# Patient Record
Sex: MALE | Race: BLACK OR AFRICAN AMERICAN | NOT HISPANIC OR LATINO | Employment: FULL TIME | URBAN - METROPOLITAN AREA
[De-identification: names, ages, dates, MRNs, and addresses within clinical notes are randomized per-mention and may not be internally consistent; named-entity substitution may affect disease eponyms.]

---

## 2018-03-07 ENCOUNTER — TELEPHONE (OUTPATIENT)
Dept: INTERNAL MEDICINE | Facility: CLINIC | Age: 63
End: 2018-03-07

## 2018-03-07 NOTE — TELEPHONE ENCOUNTER
----- Message from Cathie Huerta sent at 3/7/2018 11:10 AM CST -----  Contact: self  Pt is calling in regards to getting an earlier appt time on 03/09. Pt would like a call back in regards to this  appt change.    Pt would like a call back at [phone].    Thank you

## 2018-03-09 ENCOUNTER — OFFICE VISIT (OUTPATIENT)
Dept: INTERNAL MEDICINE | Facility: CLINIC | Age: 63
End: 2018-03-09
Attending: INTERNAL MEDICINE
Payer: COMMERCIAL

## 2018-03-09 VITALS
DIASTOLIC BLOOD PRESSURE: 98 MMHG | BODY MASS INDEX: 24.18 KG/M2 | HEART RATE: 68 BPM | HEIGHT: 70 IN | WEIGHT: 168.88 LBS | OXYGEN SATURATION: 97 % | SYSTOLIC BLOOD PRESSURE: 148 MMHG

## 2018-03-09 DIAGNOSIS — R35.0 BENIGN PROSTATIC HYPERPLASIA WITH URINARY FREQUENCY: ICD-10-CM

## 2018-03-09 DIAGNOSIS — I10 HYPERTENSION, BENIGN: ICD-10-CM

## 2018-03-09 DIAGNOSIS — E78.5 HYPERLIPIDEMIA, UNSPECIFIED HYPERLIPIDEMIA TYPE: ICD-10-CM

## 2018-03-09 DIAGNOSIS — Z00.00 ANNUAL PHYSICAL EXAM: Primary | ICD-10-CM

## 2018-03-09 DIAGNOSIS — K21.9 GASTROESOPHAGEAL REFLUX DISEASE WITHOUT ESOPHAGITIS: ICD-10-CM

## 2018-03-09 DIAGNOSIS — F41.8 SITUATIONAL ANXIETY: ICD-10-CM

## 2018-03-09 DIAGNOSIS — R42 VERTIGO: ICD-10-CM

## 2018-03-09 DIAGNOSIS — N40.1 BENIGN PROSTATIC HYPERPLASIA WITH URINARY FREQUENCY: ICD-10-CM

## 2018-03-09 PROCEDURE — 99999 PR PBB SHADOW E&M-EST. PATIENT-LVL III: CPT | Mod: PBBFAC,,, | Performed by: INTERNAL MEDICINE

## 2018-03-09 PROCEDURE — 99386 PREV VISIT NEW AGE 40-64: CPT | Mod: S$GLB,,, | Performed by: INTERNAL MEDICINE

## 2018-03-09 RX ORDER — LOSARTAN POTASSIUM 100 MG/1
100 TABLET ORAL DAILY
Qty: 90 TABLET | Refills: 0 | Status: SHIPPED | OUTPATIENT
Start: 2018-03-09 | End: 2018-07-03 | Stop reason: SDUPTHER

## 2018-03-09 RX ORDER — ATORVASTATIN CALCIUM 20 MG/1
20 TABLET, FILM COATED ORAL DAILY
COMMUNITY
End: 2018-03-09 | Stop reason: SDUPTHER

## 2018-03-09 RX ORDER — LOSARTAN POTASSIUM 100 MG/1
100 TABLET ORAL DAILY
COMMUNITY
End: 2018-03-09 | Stop reason: SDUPTHER

## 2018-03-09 RX ORDER — ATORVASTATIN CALCIUM 20 MG/1
20 TABLET, FILM COATED ORAL DAILY
Qty: 90 TABLET | Refills: 0 | Status: SHIPPED | OUTPATIENT
Start: 2018-03-09 | End: 2018-06-14 | Stop reason: SDUPTHER

## 2018-03-09 NOTE — PROGRESS NOTES
"Subjective:   Patient ID: Cuate Fair is a 62 y.o. male  Chief complaint:   Chief Complaint   Patient presents with    [facility]       HPI    Pt here to [facility] and for annual exam     Hx of HTN - was on losartan 100mg but out for 2 months   bp elevated today - no ha, cp, or sob or vision changes. Not checking bp at home    HLD - out of atorvastatin 20mg for 2 months - mika well in past  Needs refills     BPH - urinates 1-2 times per night - this is stable. No family hx of prostate cancer    3/7/2018: had chol checked at job fair   Total chol: 229  HDL 49        cscope through [facility]    Hx of GERD in past - currently no acid reflux. Not on meds for this currently  Had EGD many years ago - seen by another MD in [place] in 1980s - no ulcer at hat time.   No dry mouth. Only occ acid reflux sx  Thinks was screened for hep c in past  Prior pcp: Dr. Arya Guzman now with [facility]    Review of Systems    Objective:  Vitals:    03/09/18 1452   BP: (!) 148/98   Pulse: 68   SpO2: 97%   Weight: 76.6 kg (168 lb 14 oz)   Height: 5' 10" (1.778 m)     Body mass index is 24.23 kg/m².    Physical Exam   Constitutional: He is oriented to person, place, and time. He appears well-developed and well-nourished.   HENT:   Head: Normocephalic and atraumatic.   Right Ear: External ear normal.   Left Ear: External ear normal.   Nose: Nose normal.   Mouth/Throat: Oropharynx is clear and moist.   No carotid bruits   Eyes: Conjunctivae and EOM are normal.   Neck: Neck supple. No thyromegaly present.   Cardiovascular: Normal rate, regular rhythm, normal heart sounds and intact distal pulses.    Pulmonary/Chest: Effort normal and breath sounds normal.   Abdominal: Soft. Bowel sounds are normal.   Genitourinary:   Genitourinary Comments: Declines prostate exam   Musculoskeletal: He exhibits no edema or tenderness.   Lymphadenopathy:     He has no cervical adenopathy.   Neurological: He is alert and oriented to " person, place, and time.   Skin: Skin is warm and dry.   Psychiatric: His behavior is normal. Thought content normal.   Vitals reviewed.      Assessment:  1. Annual physical exam    2. Benign prostatic hyperplasia with urinary frequency    3. Gastroesophageal reflux disease without esophagitis    4. Vertigo    5. Situational anxiety    6. Hypertension, benign    7. Hyperlipidemia, unspecified hyperlipidemia type        Plan:  Cuate was seen today for establish care.    Diagnoses and all orders for this visit:    Annual physical exam  -     CBC auto differential; Future  -     Comprehensive metabolic panel; Future  -     Lipid panel; Future  -     PSA, Screening; Future  -     TSH; Future  Recommend daily sunscreen, cardiovascular exercise min 30 min 5 days per week. Seatbelts routinely.    Benign prostatic hyperplasia with urinary frequency  -     PSA, Screening; Future  Sx stable over past few years. Was on flomax in past - he does not think needs this and has been off of med for many months    Gastroesophageal reflux disease without esophagitis  Stable, cont diet modification    Vertigo  Hx of this - no sx currently    Situational anxiety  Stable    Other orders  -     atorvastatin (LIPITOR) 20 MG tablet; Take 1 tablet (20 mg total) by mouth once daily.  -     losartan (COZAAR) 100 MG tablet; Take 1 tablet (100 mg total) by mouth once daily.    HTN: resume prior med, uncontrolled today off of med - rtc in 2 weeks for bp check     HLD:  Elevated on recent FLP at work - resume statin, check flp in 3 months    Health Maintenance   Topic Date Due    Hepatitis C Screening  1955    TETANUS VACCINE  05/14/1975    Zoster Vaccine  05/14/2017    Colonoscopy  07/26/2020    Lipid Panel  03/07/2023    Influenza Vaccine  Addressed

## 2018-05-25 ENCOUNTER — LAB VISIT (OUTPATIENT)
Dept: LAB | Facility: OTHER | Age: 63
End: 2018-05-25
Payer: COMMERCIAL

## 2018-05-25 DIAGNOSIS — N40.1 BENIGN PROSTATIC HYPERPLASIA WITH URINARY FREQUENCY: ICD-10-CM

## 2018-05-25 DIAGNOSIS — Z00.00 ANNUAL PHYSICAL EXAM: ICD-10-CM

## 2018-05-25 DIAGNOSIS — R35.0 BENIGN PROSTATIC HYPERPLASIA WITH URINARY FREQUENCY: ICD-10-CM

## 2018-05-25 LAB
ALBUMIN SERPL BCP-MCNC: 4.2 G/DL
ALP SERPL-CCNC: 63 U/L
ALT SERPL W/O P-5'-P-CCNC: 27 U/L
ANION GAP SERPL CALC-SCNC: 11 MMOL/L
AST SERPL-CCNC: 27 U/L
BASOPHILS # BLD AUTO: 0.02 K/UL
BASOPHILS NFR BLD: 0.6 %
BILIRUB SERPL-MCNC: 0.9 MG/DL
BUN SERPL-MCNC: 15 MG/DL
CALCIUM SERPL-MCNC: 9.4 MG/DL
CHLORIDE SERPL-SCNC: 106 MMOL/L
CO2 SERPL-SCNC: 24 MMOL/L
COMPLEXED PSA SERPL-MCNC: 1.1 NG/ML
CREAT SERPL-MCNC: 1.3 MG/DL
DIFFERENTIAL METHOD: ABNORMAL
EOSINOPHIL # BLD AUTO: 0.1 K/UL
EOSINOPHIL NFR BLD: 3.8 %
ERYTHROCYTE [DISTWIDTH] IN BLOOD BY AUTOMATED COUNT: 13.1 %
EST. GFR  (AFRICAN AMERICAN): >60 ML/MIN/1.73 M^2
EST. GFR  (NON AFRICAN AMERICAN): 59 ML/MIN/1.73 M^2
GLUCOSE SERPL-MCNC: 100 MG/DL
HCT VFR BLD AUTO: 48.5 %
HGB BLD-MCNC: 16.4 G/DL
LYMPHOCYTES # BLD AUTO: 1.5 K/UL
LYMPHOCYTES NFR BLD: 43.6 %
MCH RBC QN AUTO: 30.1 PG
MCHC RBC AUTO-ENTMCNC: 33.8 G/DL
MCV RBC AUTO: 89 FL
MONOCYTES # BLD AUTO: 0.4 K/UL
MONOCYTES NFR BLD: 10.2 %
NEUTROPHILS # BLD AUTO: 1.4 K/UL
NEUTROPHILS NFR BLD: 41.8 %
PLATELET # BLD AUTO: 135 K/UL
PMV BLD AUTO: 11.7 FL
POTASSIUM SERPL-SCNC: 3.8 MMOL/L
PROT SERPL-MCNC: 7.2 G/DL
RBC # BLD AUTO: 5.44 M/UL
SODIUM SERPL-SCNC: 141 MMOL/L
TSH SERPL DL<=0.005 MIU/L-ACNC: 0.74 UIU/ML
WBC # BLD AUTO: 3.44 K/UL

## 2018-05-25 PROCEDURE — 85025 COMPLETE CBC W/AUTO DIFF WBC: CPT

## 2018-05-25 PROCEDURE — 36415 COLL VENOUS BLD VENIPUNCTURE: CPT

## 2018-05-25 PROCEDURE — 84443 ASSAY THYROID STIM HORMONE: CPT

## 2018-05-25 PROCEDURE — 84153 ASSAY OF PSA TOTAL: CPT

## 2018-05-25 PROCEDURE — 80053 COMPREHEN METABOLIC PANEL: CPT

## 2018-05-28 ENCOUNTER — TELEPHONE (OUTPATIENT)
Dept: INTERNAL MEDICINE | Facility: CLINIC | Age: 63
End: 2018-05-28

## 2018-05-28 DIAGNOSIS — D69.6 THROMBOCYTOPENIA: ICD-10-CM

## 2018-05-28 DIAGNOSIS — D72.819 LEUKOPENIA, UNSPECIFIED TYPE: Primary | ICD-10-CM

## 2018-05-28 NOTE — TELEPHONE ENCOUNTER
Please notify pt that his labs are stable except that platelets are mildly low - rec repeat this to see if it is persistent     Please schedule repeat cbc, folate, b12, path int diff in 1-2 weeks

## 2018-05-31 ENCOUNTER — PATIENT OUTREACH (OUTPATIENT)
Dept: ADMINISTRATIVE | Facility: HOSPITAL | Age: 63
End: 2018-05-31

## 2018-05-31 NOTE — PROGRESS NOTES
[facility] is committed to your overall health.  To help you get the most out of each of your visits, we will review your information to make sure you are up to date on all of your recommended tests and/or procedures.       Your PCP  Carolyne Beth MD   found that you may be due for:       Health Maintenance Due   Topic Date Due    Hepatitis C Screening  1955    TETANUS VACCINE  05/14/1975    Zoster Vaccine  05/14/2017             If you have had any of the above done at another facility, please bring the records or information with you so that your record at [facility] will be complete.  If you would like to schedule any of these, please contact me.     If you are currently taking medication, please bring it with you to your appointment for review.     Also, if you have any type of Advanced Directives, please bring them with you to your office visit so we may scan them into your chart.       Thank you for Choosing [facility] for your healthcare needs.        Additional Information  If you have questions, you can email [email or web address] or call [phone]  to talk to our [facility] staff. Remember, [employer] is NOT to be used for urgent needs. For medical emergencies, dial 911.

## 2018-06-11 DIAGNOSIS — Z11.59 NEED FOR HEPATITIS C SCREENING TEST: Primary | ICD-10-CM

## 2018-06-12 ENCOUNTER — LAB VISIT (OUTPATIENT)
Dept: LAB | Facility: OTHER | Age: 63
End: 2018-06-12
Attending: INTERNAL MEDICINE
Payer: COMMERCIAL

## 2018-06-12 ENCOUNTER — OFFICE VISIT (OUTPATIENT)
Dept: INTERNAL MEDICINE | Facility: CLINIC | Age: 63
End: 2018-06-12
Attending: INTERNAL MEDICINE
Payer: COMMERCIAL

## 2018-06-12 VITALS
OXYGEN SATURATION: 97 % | HEIGHT: 68 IN | SYSTOLIC BLOOD PRESSURE: 130 MMHG | DIASTOLIC BLOOD PRESSURE: 78 MMHG | WEIGHT: 167.31 LBS | BODY MASS INDEX: 25.36 KG/M2 | HEART RATE: 78 BPM

## 2018-06-12 DIAGNOSIS — D69.6 THROMBOCYTOPENIA: ICD-10-CM

## 2018-06-12 DIAGNOSIS — E78.5 HYPERLIPIDEMIA, UNSPECIFIED HYPERLIPIDEMIA TYPE: Primary | ICD-10-CM

## 2018-06-12 DIAGNOSIS — I10 ESSENTIAL HYPERTENSION: ICD-10-CM

## 2018-06-12 DIAGNOSIS — Z00.00 ANNUAL PHYSICAL EXAM: ICD-10-CM

## 2018-06-12 DIAGNOSIS — D70.9 NEUTROPENIA, UNSPECIFIED TYPE: ICD-10-CM

## 2018-06-12 DIAGNOSIS — Z11.59 NEED FOR HEPATITIS C SCREENING TEST: ICD-10-CM

## 2018-06-12 DIAGNOSIS — D72.819 LEUKOPENIA, UNSPECIFIED TYPE: ICD-10-CM

## 2018-06-12 LAB
BASOPHILS # BLD AUTO: 0.02 K/UL
BASOPHILS NFR BLD: 0.6 %
CHOLEST SERPL-MCNC: 206 MG/DL
CHOLEST/HDLC SERPL: 4 {RATIO}
DIFFERENTIAL METHOD: ABNORMAL
EOSINOPHIL # BLD AUTO: 0.1 K/UL
EOSINOPHIL NFR BLD: 2.8 %
ERYTHROCYTE [DISTWIDTH] IN BLOOD BY AUTOMATED COUNT: 12.9 %
FOLATE SERPL-MCNC: 12.1 NG/ML
HCT VFR BLD AUTO: 44.8 %
HDLC SERPL-MCNC: 51 MG/DL
HDLC SERPL: 24.8 %
HGB BLD-MCNC: 15.6 G/DL
LDLC SERPL CALC-MCNC: 140.2 MG/DL
LYMPHOCYTES # BLD AUTO: 1.5 K/UL
LYMPHOCYTES NFR BLD: 48.1 %
MCH RBC QN AUTO: 30.4 PG
MCHC RBC AUTO-ENTMCNC: 34.8 G/DL
MCV RBC AUTO: 87 FL
MONOCYTES # BLD AUTO: 0.3 K/UL
MONOCYTES NFR BLD: 10.7 %
NEUTROPHILS # BLD AUTO: 1.2 K/UL
NEUTROPHILS NFR BLD: 37.2 %
NONHDLC SERPL-MCNC: 155 MG/DL
PATH REV BLD -IMP: NORMAL
PLATELET # BLD AUTO: 130 K/UL
PMV BLD AUTO: 11.3 FL
RBC # BLD AUTO: 5.13 M/UL
TRIGL SERPL-MCNC: 74 MG/DL
VIT B12 SERPL-MCNC: 256 PG/ML
WBC # BLD AUTO: 3.18 K/UL

## 2018-06-12 PROCEDURE — 80061 LIPID PANEL: CPT

## 2018-06-12 PROCEDURE — 36415 COLL VENOUS BLD VENIPUNCTURE: CPT

## 2018-06-12 PROCEDURE — 3008F BODY MASS INDEX DOCD: CPT | Mod: CPTII,S$GLB,, | Performed by: INTERNAL MEDICINE

## 2018-06-12 PROCEDURE — 86803 HEPATITIS C AB TEST: CPT

## 2018-06-12 PROCEDURE — 85060 BLOOD SMEAR INTERPRETATION: CPT | Mod: ,,, | Performed by: PATHOLOGY

## 2018-06-12 PROCEDURE — 82607 VITAMIN B-12: CPT

## 2018-06-12 PROCEDURE — 3078F DIAST BP <80 MM HG: CPT | Mod: CPTII,S$GLB,, | Performed by: INTERNAL MEDICINE

## 2018-06-12 PROCEDURE — 99214 OFFICE O/P EST MOD 30 MIN: CPT | Mod: S$GLB,,, | Performed by: INTERNAL MEDICINE

## 2018-06-12 PROCEDURE — 85025 COMPLETE CBC W/AUTO DIFF WBC: CPT

## 2018-06-12 PROCEDURE — 3075F SYST BP GE 130 - 139MM HG: CPT | Mod: CPTII,S$GLB,, | Performed by: INTERNAL MEDICINE

## 2018-06-12 PROCEDURE — 99999 PR PBB SHADOW E&M-EST. PATIENT-LVL III: CPT | Mod: PBBFAC,,, | Performed by: INTERNAL MEDICINE

## 2018-06-12 PROCEDURE — 82746 ASSAY OF FOLIC ACID SERUM: CPT

## 2018-06-12 NOTE — PROGRESS NOTES
"Subjective:   Patient ID: Cuate Fair is a 63 y.o. male  Chief complaint:   Chief Complaint   Patient presents with    Follow-up     on medication       HPI    Pt here for HTN and HLD f/u   Reviewed labs from May and labs checked today - those results pending  He reports thinks was dx with hep c many years ago but then saw specialist and 'told not to worry about it'.     HTN: controlled on meds today   HLD: taking med daily - flp pending from today after pt resumed statin   Thrombocyptenia/neutropenia: no gross bleeding - labs from today pending    Review of Systems   Constitutional: Negative for chills and fever.   HENT: Negative for rhinorrhea and sore throat.    Eyes: Negative for pain and visual disturbance.   Respiratory: Negative for cough, shortness of breath and wheezing.    Cardiovascular: Negative for chest pain and palpitations.   Gastrointestinal: Negative for abdominal pain, constipation and diarrhea.   Genitourinary: Negative for dysuria and hematuria.   Musculoskeletal: Negative for back pain and joint swelling.   Skin: Negative for color change and rash.   Neurological: Negative for dizziness and headaches.   Hematological: Negative for adenopathy. Does not bruise/bleed easily.   Psychiatric/Behavioral: Negative for sleep disturbance. The patient is not nervous/anxious.        Objective:  Vitals:    06/12/18 1333   BP: 130/78   BP Location: Left arm   Patient Position: Sitting   BP Method: Medium (Manual)   Pulse: 78   SpO2: 97%   Weight: 75.9 kg (167 lb 5.3 oz)   Height: 5' 8" (1.727 m)     Body mass index is 25.44 kg/m².    Physical Exam   Constitutional: He is oriented to person, place, and time. He appears well-developed and well-nourished.   HENT:   Head: Normocephalic and atraumatic.   Eyes: Conjunctivae and EOM are normal.   Neck: Neck supple.   Cardiovascular: Normal rate, regular rhythm and intact distal pulses.    Pulmonary/Chest: Effort normal and breath sounds normal. "   Abdominal: Soft. Bowel sounds are normal.   Lymphadenopathy:     He has no cervical adenopathy.   Neurological: He is alert and oriented to person, place, and time.   Skin: Skin is warm and dry.   Psychiatric: He has a normal mood and affect. His behavior is normal. Judgment and thought content normal.   Vitals reviewed.      Assessment:  1. Need for Tdap vaccination    2. Hyperlipidemia, unspecified hyperlipidemia type    3. Essential hypertension    4. Thrombocytopenia    5. Neutropenia, unspecified type        Plan:  Cuate was seen today for follow-up.    Diagnoses and all orders for this visit:    Hyperlipidemia, unspecified hyperlipidemia type  Resumed statin, elevated at last check - review labs from today and make adjustments vs cont current dose pending those results    Essential hypertension  Controlled, cont meds    Thrombocytopenia  Labs pending today - if persistent will refer to h/o    Neutropenia, unspecified type  As above       Health Maintenance   Topic Date Due    TETANUS VACCINE  05/14/1975    Zoster Vaccine  05/14/2017    Hepatitis C Screening  06/13/2016 (Originally 1955)    Influenza Vaccine  08/01/2018    Colonoscopy  07/26/2020    Lipid Panel  03/07/2023

## 2018-06-13 ENCOUNTER — TELEPHONE (OUTPATIENT)
Dept: INTERNAL MEDICINE | Facility: CLINIC | Age: 63
End: 2018-06-13

## 2018-06-13 LAB
HCV AB SERPL QL IA: NEGATIVE
PATH REV BLD -IMP: NORMAL

## 2018-06-13 NOTE — TELEPHONE ENCOUNTER
----- Message from Kori Quezada sent at 6/13/2018  9:23 AM CDT -----  Contact: WINIFRED FERNANDEZ [[id number]]            Name of Who is Calling: WINIFRED FERNANDEZ [[id number]]    What is the request in detail: pt would like to discuss test results. Please call    Can the clinic reply by [facility]: no    What Number to Call Back [facility]: [phone]

## 2018-06-14 ENCOUNTER — TELEPHONE (OUTPATIENT)
Dept: INTERNAL MEDICINE | Facility: CLINIC | Age: 63
End: 2018-06-14

## 2018-06-14 DIAGNOSIS — D72.819 LEUKOPENIA, UNSPECIFIED TYPE: ICD-10-CM

## 2018-06-14 DIAGNOSIS — E53.8 B12 DEFICIENCY: ICD-10-CM

## 2018-06-14 DIAGNOSIS — D69.6 THROMBOCYTOPENIA: ICD-10-CM

## 2018-06-14 DIAGNOSIS — E78.5 HYPERLIPIDEMIA, UNSPECIFIED HYPERLIPIDEMIA TYPE: Primary | ICD-10-CM

## 2018-06-14 RX ORDER — LANOLIN ALCOHOL/MO/W.PET/CERES
1000 CREAM (GRAM) TOPICAL DAILY
COMMUNITY
Start: 2018-06-14

## 2018-06-14 RX ORDER — ATORVASTATIN CALCIUM 40 MG/1
40 TABLET, FILM COATED ORAL DAILY
Qty: 90 TABLET | Refills: 1 | Status: SHIPPED | OUTPATIENT
Start: 2018-06-14 | End: 2019-06-13 | Stop reason: SDUPTHER

## 2018-06-14 NOTE — TELEPHONE ENCOUNTER
Please notify pt that labs returned.   Screening for hep c is negative   Folate level wnl.     b12 is low - rec start otc b12 at 1000mcg daily to improve this  Blood counts stable but still low wbc and platelet count  rec repeat cbc in 3 months along with b12 level - if platelets and wbc still low after starting b12 will refer to hematology     Cholesterol still high - rec inc lipitor to 40mg - new rx sent to pharmacy - repeat flp in 3 months    Schedule labs in 3 months - thanks!

## 2018-06-29 ENCOUNTER — TELEPHONE (OUTPATIENT)
Dept: INTERNAL MEDICINE | Facility: CLINIC | Age: 63
End: 2018-06-29

## 2018-06-29 NOTE — TELEPHONE ENCOUNTER
Tried to call pt and inform him of PCP advice but there was no answer.   Will call pt at later time

## 2018-06-29 NOTE — TELEPHONE ENCOUNTER
----- Message from Glen Looney MA sent at 6/29/2018 12:49 PM CDT -----  Contact: Pt  Pt called and would like a call back from the nurse regarding his B/p  med that have him with dizziness.         Pt can be reached at [phone].    Thanks

## 2018-06-29 NOTE — TELEPHONE ENCOUNTER
Spoke w/ pt he called in stating that he notice that after he takes his BP medication he notices that it causes him to be dizzy.   This morning pt stated this was the worst it has been. He took his BP medication this morning and 20 mins later as he was driving he felt his self getting dizzy and pt stated that he blanked out for a quick min.  Pt did not crash or anything , he stated that he just felt his self blank out and open back up .     Pt asked for further rec or advice form PCP   Please authorize and advise

## 2018-06-29 NOTE — TELEPHONE ENCOUNTER
Recommend dec dose of losartan to 50mg and for pt to be evaluated today for symptoms at urgent care or after hours clinic   If he lost consciousness then rec he go to ER now and to not drive   Please schedule f/u appt with me or another provider for bp check and to f/u on his symptoms

## 2018-07-02 ENCOUNTER — TELEPHONE (OUTPATIENT)
Dept: INTERNAL MEDICINE | Facility: CLINIC | Age: 63
End: 2018-07-02

## 2018-07-02 NOTE — TELEPHONE ENCOUNTER
----- Message from Mindy Kern sent at 7/2/2018  9:27 AM CDT -----  Contact: Self            Name of Who is Calling: Self      What is the request in detail: Pt states he is following up regarding the blood pressure medication and what he needs to do. Pt is returning a call.      Can the clinic reply by [facility]: Y      What Number to Call Back if not in [facility]: [phone]

## 2018-07-02 NOTE — TELEPHONE ENCOUNTER
Returned pt call but there is no answer and no vm box set up     Please inform pt of PCP note from 6/29/2018 when pt return call stating:    recommend dec dose of losartan to 50mg and for pt to be evaluated today for symptoms at urgent care or after hours clinic   If he lost consciousness then rec he go to ER now and to not drive   Please schedule f/u appt with me or another provider for bp check and to f/u on his symptoms

## 2018-07-03 ENCOUNTER — OFFICE VISIT (OUTPATIENT)
Dept: INTERNAL MEDICINE | Facility: CLINIC | Age: 63
End: 2018-07-03
Payer: COMMERCIAL

## 2018-07-03 VITALS
HEIGHT: 68 IN | BODY MASS INDEX: 25.13 KG/M2 | HEART RATE: 74 BPM | SYSTOLIC BLOOD PRESSURE: 138 MMHG | OXYGEN SATURATION: 96 % | WEIGHT: 165.81 LBS | DIASTOLIC BLOOD PRESSURE: 102 MMHG

## 2018-07-03 DIAGNOSIS — E78.5 HYPERLIPIDEMIA, UNSPECIFIED HYPERLIPIDEMIA TYPE: ICD-10-CM

## 2018-07-03 DIAGNOSIS — R40.20 LOSS OF CONSCIOUSNESS: ICD-10-CM

## 2018-07-03 DIAGNOSIS — Z56.6 STRESS AT WORK: ICD-10-CM

## 2018-07-03 DIAGNOSIS — I10 ESSENTIAL HYPERTENSION: Primary | ICD-10-CM

## 2018-07-03 DIAGNOSIS — D69.6 THROMBOCYTOPENIA: ICD-10-CM

## 2018-07-03 DIAGNOSIS — F41.8 SITUATIONAL ANXIETY: ICD-10-CM

## 2018-07-03 PROCEDURE — 93010 ELECTROCARDIOGRAM REPORT: CPT | Mod: S$GLB,,, | Performed by: INTERNAL MEDICINE

## 2018-07-03 PROCEDURE — 87086 URINE CULTURE/COLONY COUNT: CPT

## 2018-07-03 PROCEDURE — 81001 URINALYSIS AUTO W/SCOPE: CPT

## 2018-07-03 PROCEDURE — 87186 SC STD MICRODIL/AGAR DIL: CPT

## 2018-07-03 PROCEDURE — 99213 OFFICE O/P EST LOW 20 MIN: CPT | Mod: S$GLB,,, | Performed by: NURSE PRACTITIONER

## 2018-07-03 PROCEDURE — 3080F DIAST BP >= 90 MM HG: CPT | Mod: CPTII,S$GLB,, | Performed by: NURSE PRACTITIONER

## 2018-07-03 PROCEDURE — 3075F SYST BP GE 130 - 139MM HG: CPT | Mod: CPTII,S$GLB,, | Performed by: NURSE PRACTITIONER

## 2018-07-03 PROCEDURE — 99999 PR PBB SHADOW E&M-EST. PATIENT-LVL V: CPT | Mod: PBBFAC,,, | Performed by: NURSE PRACTITIONER

## 2018-07-03 PROCEDURE — 3008F BODY MASS INDEX DOCD: CPT | Mod: CPTII,S$GLB,, | Performed by: NURSE PRACTITIONER

## 2018-07-03 PROCEDURE — 87077 CULTURE AEROBIC IDENTIFY: CPT

## 2018-07-03 PROCEDURE — 93005 ELECTROCARDIOGRAM TRACING: CPT | Mod: S$GLB,,, | Performed by: NURSE PRACTITIONER

## 2018-07-03 PROCEDURE — 87088 URINE BACTERIA CULTURE: CPT

## 2018-07-03 RX ORDER — LOSARTAN POTASSIUM 100 MG/1
50 TABLET ORAL DAILY
Qty: 90 TABLET | Refills: 0 | Status: SHIPPED | OUTPATIENT
Start: 2018-07-03 | End: 2018-12-14

## 2018-07-03 SDOH — SOCIAL DETERMINANTS OF HEALTH (SDOH): OTHER PHYSICAL AND MENTAL STRAIN RELATED TO WORK: Z56.6

## 2018-07-03 NOTE — TELEPHONE ENCOUNTER
Pt came into office for a visit for elevated b/p and he inform me that med for b12 was not call in.I did sppke with rx and they states that med can brought OTC.

## 2018-07-03 NOTE — PATIENT INSTRUCTIONS
Change Cozaar to 50 mg po daily.  Continue Vitamin B12 1000 mcg po daily.   EKG, Echo.  CBC, CMP, TSH, Lipid panel, Hepatic panel.  U/A and Urine Culture.  Appropriate hydration and rest.  Encouraged Healthy lifestyle changes. Encouraged to increase exercise as tolerated (moderate-intensity aerobic activity and muscle-strengthening activities) improve diet to heart healthy, low sodium diet.  Denies bleeding, excessive bruising, or petechiae.  Cautioned on aspirin, ibuprofen, and alcohol use.  Encouraged safety precautions to prevent injury.   Please report to Emergency Department for severe or worsening symptoms.  Referral to Cardiology.  Referral to Neurology.  F/U with PCP in 1 week. Please bring BP readings to appointment.  RTC prn.

## 2018-07-03 NOTE — PROGRESS NOTES
Subjective:       Patient ID: Cuate Fair is a 63 y.o. male.    Chief Complaint: Hypertension    Mr. Cuate Fair is a 64 y/o AAM who presents with a one week h/o elevated blood pressure readings. He is AAOx3, in nad, respirations even/unlabored, denies cp, sob, n/v. He reports that he discontinued use of BP medication because it was making him too dizzy. He endorses an episode LOC x1. He reports that he has been working long hours and is under a lot of stress from job which is strenuous work and requires him to be outdoors in the hot sun. He did not bring list of BP readings to this visit.   Hypertension   This is a new problem. The current episode started in the past 7 days. The problem has been gradually improving since onset. The problem is controlled. Associated symptoms include anxiety. Pertinent negatives include no blurred vision, chest pain, headaches, malaise/fatigue, neck pain, orthopnea, palpitations, peripheral edema, PND, shortness of breath or sweats. (He reports a LOC x1 episode for a few seconds.  Light-headedness  Dizziness  He also reports a lot of stress from his job) There are no associated agents to hypertension. Risk factors for coronary artery disease include stress and dyslipidemia. Past treatments include angiotensin blockers (patient reports that he has not taken BP medication in the past week). The current treatment provides moderate improvement. Compliance problems include medication side effects.  There is no history of angina, kidney disease, CAD/MI, CVA, heart failure, left ventricular hypertrophy, PVD or retinopathy. There is no history of chronic renal disease, coarctation of the aorta, hyperaldosteronism, hypercortisolism, hyperparathyroidism, a hypertension causing med, pheochromocytoma, renovascular disease, sleep apnea or a thyroid problem.        Review of Systems   Constitutional: Negative for activity change, appetite change, chills, diaphoresis, fatigue,  "fever, malaise/fatigue and unexpected weight change.   HENT: Negative for congestion, dental problem, drooling, ear discharge, ear pain, facial swelling, hearing loss, mouth sores, nosebleeds, postnasal drip, rhinorrhea, sinus pain, sinus pressure, sneezing, sore throat, tinnitus, trouble swallowing and voice change.    Eyes: Negative for blurred vision, pain and visual disturbance.   Respiratory: Negative for cough, choking, chest tightness, shortness of breath, wheezing and stridor.    Cardiovascular: Negative for chest pain, palpitations, orthopnea, leg swelling and PND.        Hypertension   Gastrointestinal: Negative for abdominal distention, abdominal pain, constipation, diarrhea, nausea, rectal pain and vomiting.   Musculoskeletal: Negative for gait problem, neck pain and neck stiffness.   Skin: Negative for color change, rash and wound.   Allergic/Immunologic: Negative for environmental allergies.   Neurological: Positive for dizziness and light-headedness. Negative for tremors, seizures, syncope, facial asymmetry, speech difficulty, weakness, numbness and headaches.        Episode of LOC.   Hematological: Negative for adenopathy. Does not bruise/bleed easily.   Psychiatric/Behavioral: Negative for agitation, confusion and decreased concentration. The patient is not nervous/anxious.        Objective:       BP (!) 138/102   Pulse 74   Ht 5' 8" (1.727 m)   Wt 75.2 kg (165 lb 12.6 oz)   SpO2 96%   BMI 25.21 kg/m²     Physical Exam   Constitutional: He is oriented to person, place, and time. He appears well-developed and well-nourished.   HENT:   Head: Normocephalic and atraumatic. Not macrocephalic and not microcephalic. Head is without raccoon's eyes, without Perera's sign, without abrasion, without contusion, without laceration, without right periorbital erythema and without left periorbital erythema. Hair is normal.   Right Ear: No lacerations. No drainage, swelling or tenderness. No foreign bodies. No " mastoid tenderness. Tympanic membrane is not injected, not scarred, not perforated, not erythematous, not retracted and not bulging. Tympanic membrane mobility is normal. No middle ear effusion. No hemotympanum. No decreased hearing is noted.   Left Ear: No lacerations. No drainage, swelling or tenderness. No foreign bodies. No mastoid tenderness. Tympanic membrane is not injected, not scarred, not perforated, not erythematous, not retracted and not bulging. Tympanic membrane mobility is normal.  No middle ear effusion. No hemotympanum. No decreased hearing is noted.   Nose: Nose normal. No mucosal edema, rhinorrhea, nose lacerations, sinus tenderness or nasal deformity.   Mouth/Throat: Uvula is midline.   Eyes: Conjunctivae and lids are normal. No scleral icterus.   Neck: Trachea normal. Neck supple. No spinous process tenderness and no muscular tenderness present. No neck rigidity. No edema, no erythema and normal range of motion present. No thyroid mass and no thyromegaly present.   Cardiovascular: Normal rate, regular rhythm, normal heart sounds and intact distal pulses.  Exam reveals no gallop and no friction rub.    No murmur heard.  Pulmonary/Chest: Effort normal and breath sounds normal. No respiratory distress. He has no wheezes. He has no rales. He exhibits no tenderness.   Abdominal: Soft. Bowel sounds are normal. He exhibits no distension.   Musculoskeletal: Normal range of motion.   Lymphadenopathy:        Head (right side): No submental, no submandibular, no tonsillar, no preauricular and no posterior auricular adenopathy present.        Head (left side): No submental, no submandibular, no tonsillar, no preauricular, no posterior auricular and no occipital adenopathy present.   Neurological: He is alert and oriented to person, place, and time. No cranial nerve deficit.   Skin: Skin is warm and dry.   Psychiatric: His behavior is normal. Judgment and thought content normal. His mood appears anxious.    Vitals reviewed.       Assessment:       1. Essential hypertension    2. Loss of consciousness    3. Hyperlipidemia, unspecified hyperlipidemia type    4. Thrombocytopenia    5. Stress at work    6. Situational anxiety        Plan:       Change Cozaar to 50 mg po daily.  Continue Vitamin B12 1000 mcg po daily.   EKG, Echocardiogram.  CBC, CMP, TSH, Lipid panel, Hepatic panel.  U/A and Urine Culture.  Appropriate hydration and rest.  Encouraged Healthy lifestyle changes. Encouraged to increase exercise as tolerated (moderate-intensity aerobic activity and muscle-strengthening activities) improve diet to heart healthy, low sodium diet.  Please report to Emergency Department for severe or worsening symptoms.  Referral to Cardiology.  Referral to Neurology.  F/U with PCP in 1 week. Please bring BP readings to appointment.  RTC prn.

## 2018-07-03 NOTE — TELEPHONE ENCOUNTER
----- Message from Matt Sheikh sent at 7/3/2018  9:10 AM CDT -----  Contact: Cuate Fair            Name of Who is Calling: Cuate Fair      What is the request in detail: Patient following up on his high blood pressure    Can the clinic reply by [facility]: No      What Number to Call Back if not in [facility]: [phone]

## 2018-07-04 LAB
BILIRUB UR QL STRIP: NEGATIVE
CLARITY UR REFRACT.AUTO: CLEAR
COLOR UR AUTO: YELLOW
GLUCOSE UR QL STRIP: NEGATIVE
HGB UR QL STRIP: NEGATIVE
KETONES UR QL STRIP: NEGATIVE
LEUKOCYTE ESTERASE UR QL STRIP: NEGATIVE
MICROSCOPIC COMMENT: NORMAL
NITRITE UR QL STRIP: NEGATIVE
PH UR STRIP: 6 [PH] (ref 5–8)
PROT UR QL STRIP: NEGATIVE
RBC #/AREA URNS AUTO: 2 /HPF (ref 0–4)
SP GR UR STRIP: 1.02 (ref 1–1.03)
URN SPEC COLLECT METH UR: NORMAL
UROBILINOGEN UR STRIP-ACNC: NEGATIVE EU/DL
WBC #/AREA URNS AUTO: 0 /HPF (ref 0–5)

## 2018-07-05 ENCOUNTER — LAB VISIT (OUTPATIENT)
Dept: LAB | Facility: OTHER | Age: 63
End: 2018-07-05
Attending: INTERNAL MEDICINE
Payer: COMMERCIAL

## 2018-07-05 DIAGNOSIS — E78.5 HYPERLIPIDEMIA, UNSPECIFIED HYPERLIPIDEMIA TYPE: ICD-10-CM

## 2018-07-05 DIAGNOSIS — I10 ESSENTIAL HYPERTENSION: ICD-10-CM

## 2018-07-05 LAB
ALBUMIN SERPL BCP-MCNC: 4.4 G/DL
ALP SERPL-CCNC: 60 U/L
ALT SERPL W/O P-5'-P-CCNC: 16 U/L
ANION GAP SERPL CALC-SCNC: 9 MMOL/L
AST SERPL-CCNC: 20 U/L
BILIRUB SERPL-MCNC: 1 MG/DL
BUN SERPL-MCNC: 16 MG/DL
CALCIUM SERPL-MCNC: 9.6 MG/DL
CHLORIDE SERPL-SCNC: 105 MMOL/L
CO2 SERPL-SCNC: 27 MMOL/L
CREAT SERPL-MCNC: 1.3 MG/DL
EST. GFR  (AFRICAN AMERICAN): >60 ML/MIN/1.73 M^2
EST. GFR  (NON AFRICAN AMERICAN): 59 ML/MIN/1.73 M^2
GLUCOSE SERPL-MCNC: 83 MG/DL
POTASSIUM SERPL-SCNC: 4.1 MMOL/L
PROT SERPL-MCNC: 7.5 G/DL
SODIUM SERPL-SCNC: 141 MMOL/L
TSH SERPL DL<=0.005 MIU/L-ACNC: 0.82 UIU/ML

## 2018-07-05 PROCEDURE — 36415 COLL VENOUS BLD VENIPUNCTURE: CPT

## 2018-07-05 PROCEDURE — 84443 ASSAY THYROID STIM HORMONE: CPT

## 2018-07-05 PROCEDURE — 80053 COMPREHEN METABOLIC PANEL: CPT

## 2018-07-06 LAB — BACTERIA UR CULT: NORMAL

## 2018-07-07 PROBLEM — I10 HTN (HYPERTENSION), BENIGN: Status: ACTIVE | Noted: 2018-07-07

## 2018-07-07 PROBLEM — E78.00 HYPERCHOLESTEROLEMIA: Status: ACTIVE | Noted: 2018-07-07

## 2018-07-07 PROBLEM — E66.3 OVERWEIGHT (BMI 25.0-29.9): Status: ACTIVE | Noted: 2018-07-07

## 2018-07-08 NOTE — PROGRESS NOTES
Subjective:   Patient ID:  Cuate Fair is a 63 y.o. male who presents for evaluation of HTN    HPI: Patient is here for evaluation and treatment of hypertension.The patient is here for CAD risk factors.     The patient has no chest pain, SOB, TIA, palpitations, syncope .Patient does exercise alot.He was not always compliant with lipid med.He had 1-2 seconds of presyncope while driving and has dizziness or head fullness in am.No alcohol or smoking.Trying to keep salt low.        Review of Systems   Constitution: Negative for chills, decreased appetite, diaphoresis, fever, weakness, malaise/fatigue, night sweats, weight gain and weight loss.   HENT: Negative for congestion, hoarse voice, nosebleeds, sore throat and tinnitus.    Eyes: Negative for blurred vision, double vision, vision loss in left eye, vision loss in right eye, visual disturbance and visual halos.   Cardiovascular: Positive for near-syncope. Negative for chest pain, claudication, cyanosis, dyspnea on exertion, irregular heartbeat, leg swelling, orthopnea, palpitations, paroxysmal nocturnal dyspnea and syncope.   Respiratory: Negative for cough, hemoptysis, shortness of breath, sleep disturbances due to breathing, snoring, sputum production and wheezing.    Endocrine: Negative for cold intolerance, heat intolerance, polydipsia, polyphagia and polyuria.   Hematologic/Lymphatic: Negative for adenopathy and bleeding problem. Does not bruise/bleed easily.   Skin: Negative for color change, dry skin, flushing, itching, nail changes, poor wound healing, rash, skin cancer, suspicious lesions and unusual hair distribution.   Musculoskeletal: Negative for arthritis, back pain, falls, gout, joint pain, joint swelling, muscle cramps, muscle weakness, myalgias and stiffness.   Gastrointestinal: Negative for abdominal pain, anorexia, change in bowel habit, constipation, diarrhea, dysphagia, heartburn, hematemesis, hematochezia, melena and vomiting.  "  Genitourinary: Negative for decreased libido, dysuria, hematuria, hesitancy and urgency.   Neurological: Positive for dizziness. Negative for excessive daytime sleepiness, focal weakness, headaches, light-headedness, loss of balance, numbness, paresthesias, seizures, sensory change, tremors and vertigo.   Psychiatric/Behavioral: Negative for altered mental status, depression, hallucinations, memory loss, substance abuse and suicidal ideas. The patient does not have insomnia and is not nervous/anxious.    Allergic/Immunologic: Negative for environmental allergies and hives.       Objective: BP (!) 132/100   Pulse 64   Ht 5' 8" (1.727 m)   Wt 74.5 kg (164 lb 3.9 oz)   BMI 24.97 kg/m²      Physical Exam   Constitutional: He is oriented to person, place, and time. He appears well-developed and well-nourished. No distress.   HENT:   Head: Normocephalic.   Eyes: EOM are normal. Pupils are equal, round, and reactive to light.   Neck: Normal range of motion. No thyromegaly present.   Cardiovascular: Normal rate, regular rhythm, normal heart sounds and intact distal pulses.  Exam reveals no gallop and no friction rub.    No murmur heard.  Pulses:       Carotid pulses are 3+ on the right side, and 3+ on the left side.       Radial pulses are 3+ on the right side, and 3+ on the left side.        Femoral pulses are 3+ on the right side, and 3+ on the left side.       Popliteal pulses are 3+ on the right side, and 3+ on the left side.        Dorsalis pedis pulses are 3+ on the right side, and 3+ on the left side.        Posterior tibial pulses are 3+ on the right side, and 3+ on the left side.   Pulmonary/Chest: Effort normal and breath sounds normal. No respiratory distress. He has no wheezes. He has no rales. He exhibits no tenderness.   Abdominal: Soft. He exhibits no distension and no mass. There is no tenderness.   Musculoskeletal: Normal range of motion.   Lymphadenopathy:     He has no cervical adenopathy. "   Neurological: He is alert and oriented to person, place, and time.   Skin: Skin is warm. He is not diaphoretic. No cyanosis. Nails show no clubbing.   Psychiatric: He has a normal mood and affect. His speech is normal and behavior is normal. Judgment and thought content normal. Cognition and memory are normal.       Assessment:     1. HTN (hypertension), benign    2. Hypercholesterolemia    3. Postural dizziness with presyncope        Plan:   Discussed diet , achieving and maintaining ideal body weight, and exercise.   We reviewed meds in detail.  Reassured-discussed goals, options , plan  Explained losartan is not usually first line for AA HTN  Discussed low salt and taking atorvastatin  Add amlodipine at nite  Take cuff BPs and goal < 130 /80  Add baby ASA 81 mg and omega-3 > 800/d EPA/DHA  Cuate was seen today for hypertension, loss of consciousness and dizziness.    Diagnoses and all orders for this visit:    HTN (hypertension), benign  -     Lipid panel; Future; Expected date: 09/24/2018  -     Comprehensive metabolic panel; Future; Expected date: 09/24/2018  -     TSH; Future; Expected date: 09/24/2018  -     amLODIPine (NORVASC) 5 MG tablet; Take 1 tablet (5 mg total) by mouth once daily. One-2 per day or as directed  -     aspirin (ECOTRIN) 81 MG EC tablet; Take 1 tablet (81 mg total) by mouth once daily.    Hypercholesterolemia  -     Lipid panel; Future; Expected date: 09/24/2018  -     Comprehensive metabolic panel; Future; Expected date: 09/24/2018  -     TSH; Future; Expected date: 09/24/2018  -     amLODIPine (NORVASC) 5 MG tablet; Take 1 tablet (5 mg total) by mouth once daily. One-2 per day or as directed  -     aspirin (ECOTRIN) 81 MG EC tablet; Take 1 tablet (81 mg total) by mouth once daily.    Postural dizziness with presyncope  -     Holter monitor - 48 hour; Future; Expected date: 07/10/2018            Follow-up if symptoms worsen or fail to improve, for 48 hr Holter; lab 3 months with  nurse BP and find out BPs.

## 2018-07-09 ENCOUNTER — OFFICE VISIT (OUTPATIENT)
Dept: CARDIOLOGY | Facility: CLINIC | Age: 63
End: 2018-07-09
Payer: COMMERCIAL

## 2018-07-09 ENCOUNTER — CLINICAL SUPPORT (OUTPATIENT)
Dept: ELECTROPHYSIOLOGY | Facility: CLINIC | Age: 63
End: 2018-07-09
Attending: INTERNAL MEDICINE
Payer: COMMERCIAL

## 2018-07-09 VITALS
BODY MASS INDEX: 24.89 KG/M2 | SYSTOLIC BLOOD PRESSURE: 132 MMHG | HEIGHT: 68 IN | HEART RATE: 64 BPM | DIASTOLIC BLOOD PRESSURE: 100 MMHG | WEIGHT: 164.25 LBS

## 2018-07-09 DIAGNOSIS — R42 POSTURAL DIZZINESS WITH PRESYNCOPE: ICD-10-CM

## 2018-07-09 DIAGNOSIS — R55 POSTURAL DIZZINESS WITH PRESYNCOPE: ICD-10-CM

## 2018-07-09 DIAGNOSIS — I10 HTN (HYPERTENSION), BENIGN: Primary | ICD-10-CM

## 2018-07-09 DIAGNOSIS — E78.00 HYPERCHOLESTEROLEMIA: ICD-10-CM

## 2018-07-09 PROCEDURE — 99999 PR PBB SHADOW E&M-EST. PATIENT-LVL III: CPT | Mod: PBBFAC,,, | Performed by: INTERNAL MEDICINE

## 2018-07-09 PROCEDURE — 99244 OFF/OP CNSLTJ NEW/EST MOD 40: CPT | Mod: S$GLB,,, | Performed by: INTERNAL MEDICINE

## 2018-07-09 PROCEDURE — 93224 XTRNL ECG REC UP TO 48 HRS: CPT | Mod: S$GLB,,, | Performed by: INTERNAL MEDICINE

## 2018-07-09 RX ORDER — ASPIRIN 81 MG/1
81 TABLET ORAL DAILY
Qty: 30 TABLET | Refills: 0
Start: 2018-07-09 | End: 2019-09-04

## 2018-07-09 RX ORDER — AMLODIPINE BESYLATE 5 MG/1
5 TABLET ORAL DAILY
Qty: 90 TABLET | Refills: 3 | Status: SHIPPED | OUTPATIENT
Start: 2018-07-09 | End: 2019-07-07 | Stop reason: SDUPTHER

## 2018-07-09 NOTE — LETTER
July 9, 2018      Dimitris Patiño, NP  [address]  [address]  [facility] [address]           [facility] - Cardiology  [address]  [place] [address]  Phone: [phone]          Patient: Cuate Fair   MR Number: [id number]   YOB: 1955   Date of Visit: 7/9/2018       Dear Dimitris Patiño:    Thank you for referring Cuate Fair to me for evaluation. Attached you will find relevant portions of my assessment and plan of care.    If you have questions, please do not hesitate to call me. I look forward to following Cuate Fair along with you.    Sincerely,    Taco Souza MD    Enclosure  CC:  No Recipients    If you would like to receive this communication electronically, please contact [email or web address] or [phone] to request more information on [employer]â€™s Link access.    For providers and/or their staff who would like to refer a patient to [facility], please contact us through our one-stop-shop provider referral line, [facility] , at [phone].    If you feel you have received this communication in error or would no longer like to receive these types of communications, please e-mail [email or web address]

## 2018-07-09 NOTE — PATIENT INSTRUCTIONS
Discussed diet , achieving and maintaining ideal body weight, and exercise.   We reviewed meds in detail.  Reassured-discussed goals, options , plan  Explained losartan is not usually first line for AA HTN  Discussed low salt and taking atorvastatin  Add amlodipine at nite  Take cuff BPs and goal < 130 /80  Add baby ASA 81 mg and omega-3 > 800/d EPA/DHA

## 2018-07-20 ENCOUNTER — TELEPHONE (OUTPATIENT)
Dept: CARDIOLOGY | Facility: CLINIC | Age: 63
End: 2018-07-20

## 2018-07-20 NOTE — TELEPHONE ENCOUNTER
Results of recenNotes recorded by Taco Souza MD on 7/14/2018 at 5:22 PM CDT  Release -Just a few slips nothing worrisomet holter monitor were given to patient.

## 2018-07-30 ENCOUNTER — TELEPHONE (OUTPATIENT)
Dept: INTERNAL MEDICINE | Facility: CLINIC | Age: 63
End: 2018-07-30

## 2018-07-30 NOTE — TELEPHONE ENCOUNTER
Spoke w/ pt he stated that he was have an tooth extraction and was curious I he should double or stop any of his medications   I have informed pt that at this time he can cont taking his med as directed. No medication change from PCP at this time .  Pt verbally understands and has no further questions

## 2018-07-30 NOTE — TELEPHONE ENCOUNTER
----- Message from Fidelina Flor sent at 7/30/2018 12:17 PM CDT -----  Contact: WINIFRED FERNANDEZ [[id number]]            Name of Who is Calling: WINIFRED FERNANDEZ [[id number]]      What is the request in detail: Patient called he stated that he is getting a tooth extracted and need advise. Please call him.      Can the clinic reply by [facility]: No    What Number to [facility] Back [facility]: [phone]

## 2018-09-18 ENCOUNTER — OFFICE VISIT (OUTPATIENT)
Dept: NEUROLOGY | Facility: CLINIC | Age: 63
End: 2018-09-18
Payer: COMMERCIAL

## 2018-09-18 VITALS
HEIGHT: 68 IN | SYSTOLIC BLOOD PRESSURE: 135 MMHG | HEART RATE: 65 BPM | BODY MASS INDEX: 24.52 KG/M2 | WEIGHT: 161.81 LBS | DIASTOLIC BLOOD PRESSURE: 82 MMHG

## 2018-09-18 DIAGNOSIS — I10 ESSENTIAL HYPERTENSION: ICD-10-CM

## 2018-09-18 DIAGNOSIS — R41.3 TRANSIENT MEMORY LOSS: Primary | ICD-10-CM

## 2018-09-18 DIAGNOSIS — E78.00 HYPERCHOLESTEROLEMIA: ICD-10-CM

## 2018-09-18 PROCEDURE — 3008F BODY MASS INDEX DOCD: CPT | Mod: CPTII,S$GLB,, | Performed by: PSYCHIATRY & NEUROLOGY

## 2018-09-18 PROCEDURE — 99204 OFFICE O/P NEW MOD 45 MIN: CPT | Mod: S$GLB,,, | Performed by: PSYCHIATRY & NEUROLOGY

## 2018-09-18 PROCEDURE — 99999 PR PBB SHADOW E&M-EST. PATIENT-LVL III: CPT | Mod: PBBFAC,,, | Performed by: PSYCHIATRY & NEUROLOGY

## 2018-09-18 PROCEDURE — 3075F SYST BP GE 130 - 139MM HG: CPT | Mod: CPTII,S$GLB,, | Performed by: PSYCHIATRY & NEUROLOGY

## 2018-09-18 PROCEDURE — 3079F DIAST BP 80-89 MM HG: CPT | Mod: CPTII,S$GLB,, | Performed by: PSYCHIATRY & NEUROLOGY

## 2018-09-18 NOTE — PATIENT INSTRUCTIONS
Discussed with patient regarding symptoms that most likely represent orthostatic hypotension related to medications.  Since decrease in the dosage of one of the blood pressure medication he has had no further episodes.  His transient amnesia is most likely related to transient hypoperfusion, as he did not lose motor tone and recovered almost immediately.  He did not lose control of the car during time.  It is not characteristic of a seizure or a transient ischemic episode.  Would get a carotid ultrasound to complete workup.  Advised close monitoring of his blood pressure, not skipping breakfast in the morning and if he has any further episodes of lightheadedness to contact his cardiologist.  If the carotid ultrasound shows no significant stenosis, he may continue follow-up with his primary care physician.

## 2018-09-18 NOTE — PROGRESS NOTES
Subjective:       Patient ID: Cuate Fair is a 63 y.o. male.    Chief Complaint:  Loss of Consciousness      History of Present Illness  HPI   This is a 63-year-old  male who was referred for neurological evaluation.  He is not sure as to why he was referred here.  He was apparently seen by the nurse practitioner on 07/03/2018.  At that time he had reported that had had episodic lightheadedness and dizziness but without vertigo, usually on awakening in the morning and after taking his blood pressure medications.  He reports that he usually leaves for work at  6 am and works anywhere from 10-12 hours a day.  About a week ago he reports that while driving to work in the morning he started feeling lightheaded and was briefly amnestic though he continued driving and did not lose control of the car.  He reports that this may have lasted seconds and he continued driving to work as he otherwise felt fine.  He did contact his primary care physician following the episode who decreased his blood pressure medication and advised him to go to the emergency room however he decided to see the nurse practitioner the following day as he was otherwise feeling fine.  He was also referred to Cardiology.    The patient reports that he had been having intermittent problems with lightheadedness usually in the mornings after taking his blood pressure medications but since decrease of the dosage he has had no further spells.  He also reported that since he had to leave early in the morning to work he would occasionally miss breakfast even though he took his blood pressure medications.  He has never had any episodes of passing out in the past and denies any headaches or visual difficulties or any other focal neurological symptoms.  He has no history of seizures.  Noted cardiology evaluation that his symptoms of presyncope were most likely related to postural hypotension/dizziness.  A Holter monitor was done however  no episodes were reported during the monitoring.       Review of Systems  Review of Systems   Constitutional: Negative.    HENT: Negative.  Negative for hearing loss.    Eyes: Negative.  Negative for visual disturbance.   Respiratory: Negative.  Negative for shortness of breath.    Cardiovascular: Negative.  Negative for chest pain and palpitations.   Gastrointestinal: Negative.    Endocrine: Negative.    Genitourinary: Negative.    Musculoskeletal: Negative.  Negative for back pain and gait problem.   Skin: Negative.    Allergic/Immunologic: Negative.    Neurological: Positive for syncope and light-headedness. Negative for dizziness, tremors, seizures, speech difficulty, weakness, numbness and headaches.   Hematological: Negative.    Psychiatric/Behavioral: Negative.  Negative for decreased concentration and sleep disturbance.       Objective:      Neurologic Exam     Mental Status   Oriented to person, place, and time.   Registration: recalls 3 of 3 objects. Follows 3 step commands.   Attention: normal. Concentration: normal.   Speech: speech is normal   Level of consciousness: alert  Knowledge: good.   Able to name object. Able to read. Able to repeat. Able to write. Normal comprehension.     Cranial Nerves   Cranial nerves II through XII intact.     Motor Exam   Muscle bulk: normal  Overall muscle tone: normal    Strength   Strength 5/5 throughout.     Sensory Exam   Light touch normal.   Proprioception normal.   Pinprick normal.     Gait, Coordination, and Reflexes     Gait  Gait: normal    Coordination   Romberg: negative  Finger to nose coordination: normal    Tremor   Resting tremor: absent  Intention tremor: absent  Action tremor: absent    Reflexes   Right brachioradialis: 1+  Left brachioradialis: 1+  Right biceps: 1+  Left biceps: 1+  Right triceps: 1+  Left triceps: 1+  Right patellar: 1+  Left patellar: 1+  Right achilles: 1+  Left achilles: 1+  Right plantar: normal  Left plantar: normal      Physical  Exam   Constitutional: He is oriented to person, place, and time. He appears well-developed and well-nourished.   HENT:   Head: Normocephalic and atraumatic.   Neck: Normal range of motion. Neck supple. Carotid bruit is not present.   Neurological: He is oriented to person, place, and time. He has normal strength. He has a normal Finger-Nose-Finger Test and a normal Romberg Test. Gait normal.   Reflex Scores:       Tricep reflexes are 1+ on the right side and 1+ on the left side.       Bicep reflexes are 1+ on the right side and 1+ on the left side.       Brachioradialis reflexes are 1+ on the right side and 1+ on the left side.       Patellar reflexes are 1+ on the right side and 1+ on the left side.       Achilles reflexes are 1+ on the right side and 1+ on the left side.  Psychiatric: His speech is normal.   Vitals reviewed.        Assessment:        1. Transient memory loss    2. Essential hypertension    3. Hypercholesterolemia            Plan:       Discussed with patient regarding symptoms that most likely represent orthostatic hypotension related to medications.  Since decrease in the dosage of one of the blood pressure medication he has had no further episodes.  His transient amnesia is most likely related to transient hypoperfusion, as he did not lose motor tone and recovered almost immediately.  He did not lose control of the car during time.  It is not characteristic of a seizure or a transient ischemic episode.  Would get a carotid ultrasound to complete workup.  Advised close monitoring of his blood pressure, not skipping breakfast in the morning and if he has any further episodes of lightheadedness to contact his cardiologist.  If the carotid ultrasound shows no significant stenosis, he may continue follow-up with his primary care physician.

## 2018-09-22 ENCOUNTER — HOSPITAL ENCOUNTER (OUTPATIENT)
Dept: RADIOLOGY | Facility: OTHER | Age: 63
Discharge: HOME | End: 2018-09-22
Attending: PSYCHIATRY & NEUROLOGY
Payer: COMMERCIAL

## 2018-09-22 DIAGNOSIS — R41.3 TRANSIENT MEMORY LOSS: ICD-10-CM

## 2018-09-22 DIAGNOSIS — I10 ESSENTIAL HYPERTENSION: ICD-10-CM

## 2018-09-22 DIAGNOSIS — E78.00 HYPERCHOLESTEROLEMIA: ICD-10-CM

## 2018-09-22 PROCEDURE — 93880 EXTRACRANIAL BILAT STUDY: CPT | Mod: TC

## 2018-09-22 PROCEDURE — 93880 EXTRACRANIAL BILAT STUDY: CPT | Mod: 26,,, | Performed by: RADIOLOGY

## 2018-10-08 RX ORDER — ATORVASTATIN CALCIUM 20 MG/1
TABLET, FILM COATED ORAL
Qty: 90 TABLET | Refills: 0 | OUTPATIENT
Start: 2018-10-08

## 2018-11-09 ENCOUNTER — OFFICE VISIT (OUTPATIENT)
Dept: INTERNAL MEDICINE | Facility: CLINIC | Age: 63
End: 2018-11-09
Payer: COMMERCIAL

## 2018-11-09 DIAGNOSIS — Z71.2 ENCOUNTER TO DISCUSS TEST RESULTS: Primary | ICD-10-CM

## 2018-11-09 PROCEDURE — 99213 OFFICE O/P EST LOW 20 MIN: CPT | Mod: S$GLB,,, | Performed by: NURSE PRACTITIONER

## 2018-11-09 PROCEDURE — 3074F SYST BP LT 130 MM HG: CPT | Mod: CPTII,S$GLB,, | Performed by: NURSE PRACTITIONER

## 2018-11-09 PROCEDURE — 99999 PR PBB SHADOW E&M-EST. PATIENT-LVL III: CPT | Mod: PBBFAC,,, | Performed by: NURSE PRACTITIONER

## 2018-11-09 PROCEDURE — 3078F DIAST BP <80 MM HG: CPT | Mod: CPTII,S$GLB,, | Performed by: NURSE PRACTITIONER

## 2018-11-13 NOTE — PROGRESS NOTES
Subjective:       Patient ID: Cuate Fair is a 63 y.o. male.    Chief Complaint: Follow-up                      [id number];c  Review of Systems    Objective:      Physical Exam    Assessment:       No diagnosis found.    Plan:       ***

## 2018-11-13 NOTE — PROGRESS NOTES
Subjective:       Patient ID: Cuate Fair is a 63 y.o. male.    Chief Complaint: Follow-up    HPI   Mr. Cuate Fair is a 62 y/o AAM who presents to discuss labs. He is AAOx3, in nad, respirations even/unlabored, denies cp, sob, n/v. No complaints. He was seen on 07/03/2018 for HTN, LOC, stress at work, and situational stress. Unremarkable labs. Recommendations were to f/u with Cardiology, Neurology, and his PCP. He was seen by Cardiology on 07/09/18 and Neurology on 09/18/18.    EKG 07/03/18 results revealed: Sinus bradycardia. Otherwise normal ECG. When compared with ECG of 17-DEC-2015 20:21. No significant change was found.  Biateral Carotid US (09/18/18) results revealed: There is less than 50% stenosis identified bilaterally.  No hemodynamically significant stenosis.    Past Medical History: Patient has a past medical history of Colon polyp, Hypercholesteremia, and Hypertension.    Past Surgical History: Patient has a past surgical history that includes Colonoscopy and Tonsillectomy.    Social History: Patient reports that  has never smoked. he has never used smokeless tobacco. He reports that he does not drink alcohol or use drugs.    Family History: family history includes Cancer in his brother and mother; Hemophilia in his son; No Known Problems in his daughter, father, and son.    Medications:   Current Outpatient Medications   Medication Sig    amLODIPine (NORVASC) 5 MG tablet Take 1 tablet (5 mg total) by mouth once daily. One-2 per day or as directed    atorvastatin (LIPITOR) 40 MG tablet Take 1 tablet (40 mg total) by mouth once daily.    cyanocobalamin (VITAMIN B-12) 1000 MCG tablet Take 1 tablet (1,000 mcg total) by mouth once daily.    losartan (COZAAR) 100 MG tablet Take 0.5 tablets (50 mg total) by mouth once daily.    aspirin (ECOTRIN) 81 MG EC tablet Take 1 tablet (81 mg total) by mouth once daily.    diphth,pertus,acell,,tetanus (BOOSTRIX TDAP) 2.5-8-5 Lf-mcg-Lf/0.5mL  Syrg injection Inject into the muscle.     No current facility-administered medications for this visit.        Allergies: Patient is allergic to codeine.    Review of Systems   Constitutional: Negative for activity change, fatigue, fever and unexpected weight change.   HENT: Negative for congestion, dental problem, ear discharge, ear pain, facial swelling, hearing loss, mouth sores, nosebleeds, postnasal drip, rhinorrhea, sinus pressure, sneezing, sore throat, tinnitus, trouble swallowing and voice change.    Eyes: Negative for pain and visual disturbance.   Respiratory: Negative for cough, choking, chest tightness, shortness of breath, wheezing and stridor.    Cardiovascular: Negative for chest pain.   Gastrointestinal: Negative for nausea and vomiting.   Musculoskeletal: Negative for gait problem, neck pain and neck stiffness.   Skin: Negative for color change, rash and wound.   Allergic/Immunologic: Negative for environmental allergies.   Neurological: Negative for dizziness, seizures, syncope, facial asymmetry, speech difficulty, weakness, light-headedness, numbness and headaches.   Psychiatric/Behavioral: Negative for agitation, confusion and decreased concentration. The patient is not nervous/anxious.        Objective:       /60   Pulse 78   SpO2 99%     Physical Exam   Constitutional: He is oriented to person, place, and time. He appears well-developed and well-nourished. He is not intubated.   HENT:   Head: Normocephalic and atraumatic. Not macrocephalic and not microcephalic. Head is without raccoon's eyes, without Perera's sign, without abrasion, without contusion, without laceration, without right periorbital erythema and without left periorbital erythema. Hair is normal.   Right Ear: No lacerations. No drainage, swelling or tenderness. No foreign bodies. No mastoid tenderness. Tympanic membrane is not injected, not scarred, not perforated, not erythematous, not retracted and not bulging. Tympanic  membrane mobility is normal. No middle ear effusion. No hemotympanum. No decreased hearing is noted.   Left Ear: No lacerations. No drainage, swelling or tenderness. No foreign bodies. No mastoid tenderness. Tympanic membrane is not injected, not scarred, not perforated, not erythematous, not retracted and not bulging. Tympanic membrane mobility is normal.  No middle ear effusion. No hemotympanum. No decreased hearing is noted.   Nose: Nose normal. No mucosal edema, rhinorrhea, nose lacerations, sinus tenderness or nasal deformity.   Mouth/Throat: Uvula is midline.   Eyes: Conjunctivae and lids are normal. No scleral icterus.   Neck: Trachea normal. Neck supple. No spinous process tenderness and no muscular tenderness present. No neck rigidity. No edema, no erythema and normal range of motion present. No thyroid mass and no thyromegaly present.   Cardiovascular: Normal rate, regular rhythm, S1 normal, S2 normal, normal heart sounds and intact distal pulses. Exam reveals no gallop and no friction rub.   No murmur heard.  Pulmonary/Chest: Effort normal and breath sounds normal. No accessory muscle usage or stridor. No apnea, no tachypnea and no bradypnea. He is not intubated. No respiratory distress. He has no decreased breath sounds. He has no wheezes. He has no rhonchi. He has no rales. He exhibits no tenderness.   Abdominal: Soft. Bowel sounds are normal. He exhibits no distension. There is no tenderness.   Musculoskeletal: Normal range of motion.   Lymphadenopathy:        Head (right side): No submental, no submandibular, no tonsillar, no preauricular and no posterior auricular adenopathy present.        Head (left side): No submental, no submandibular, no tonsillar, no preauricular, no posterior auricular and no occipital adenopathy present.   Neurological: He is alert and oriented to person, place, and time.   Skin: Skin is warm and dry.   Psychiatric: He has a normal mood and affect. His behavior is normal.  Judgment and thought content normal.   Vitals reviewed.      Assessment:       1. Encounter to discuss test results        Plan:       Lab results discussed. Questions answered.   F/U with PCP.

## 2018-11-15 ENCOUNTER — IMMUNIZATION (OUTPATIENT)
Dept: PHARMACY | Facility: CLINIC | Age: 63
End: 2018-11-15
Payer: COMMERCIAL

## 2018-11-25 VITALS — DIASTOLIC BLOOD PRESSURE: 60 MMHG | HEART RATE: 78 BPM | SYSTOLIC BLOOD PRESSURE: 120 MMHG | OXYGEN SATURATION: 99 %

## 2018-12-14 ENCOUNTER — LAB VISIT (OUTPATIENT)
Dept: LAB | Facility: OTHER | Age: 63
End: 2018-12-14
Attending: INTERNAL MEDICINE
Payer: COMMERCIAL

## 2018-12-14 ENCOUNTER — OFFICE VISIT (OUTPATIENT)
Dept: INTERNAL MEDICINE | Facility: CLINIC | Age: 63
End: 2018-12-14
Attending: INTERNAL MEDICINE
Payer: COMMERCIAL

## 2018-12-14 VITALS
HEIGHT: 68 IN | SYSTOLIC BLOOD PRESSURE: 126 MMHG | HEART RATE: 70 BPM | OXYGEN SATURATION: 99 % | BODY MASS INDEX: 25.07 KG/M2 | WEIGHT: 165.38 LBS | DIASTOLIC BLOOD PRESSURE: 84 MMHG

## 2018-12-14 DIAGNOSIS — Z20.5 EXPOSURE TO HEPATITIS: ICD-10-CM

## 2018-12-14 DIAGNOSIS — R21 RASH: ICD-10-CM

## 2018-12-14 DIAGNOSIS — D72.819 LEUKOPENIA, UNSPECIFIED TYPE: ICD-10-CM

## 2018-12-14 DIAGNOSIS — D69.6 THROMBOCYTOPENIA: ICD-10-CM

## 2018-12-14 DIAGNOSIS — E53.8 B12 DEFICIENCY: ICD-10-CM

## 2018-12-14 DIAGNOSIS — I10 ESSENTIAL HYPERTENSION: Primary | ICD-10-CM

## 2018-12-14 DIAGNOSIS — E78.5 HYPERLIPIDEMIA, UNSPECIFIED HYPERLIPIDEMIA TYPE: ICD-10-CM

## 2018-12-14 LAB
ALBUMIN SERPL BCP-MCNC: 4.3 G/DL
ALP SERPL-CCNC: 71 U/L
ALT SERPL W/O P-5'-P-CCNC: 30 U/L
AST SERPL-CCNC: 24 U/L
BASOPHILS # BLD AUTO: 0.02 K/UL
BASOPHILS NFR BLD: 0.5 %
BILIRUB DIRECT SERPL-MCNC: 0.3 MG/DL
BILIRUB SERPL-MCNC: 0.8 MG/DL
CHOLEST SERPL-MCNC: 156 MG/DL
CHOLEST/HDLC SERPL: 3 {RATIO}
DIFFERENTIAL METHOD: NORMAL
EOSINOPHIL # BLD AUTO: 0.2 K/UL
EOSINOPHIL NFR BLD: 4.7 %
ERYTHROCYTE [DISTWIDTH] IN BLOOD BY AUTOMATED COUNT: 12.7 %
HCT VFR BLD AUTO: 46.3 %
HDLC SERPL-MCNC: 52 MG/DL
HDLC SERPL: 33.3 %
HGB BLD-MCNC: 15.9 G/DL
LDLC SERPL CALC-MCNC: 91.6 MG/DL
LYMPHOCYTES # BLD AUTO: 1.8 K/UL
LYMPHOCYTES NFR BLD: 42.1 %
MCH RBC QN AUTO: 30.2 PG
MCHC RBC AUTO-ENTMCNC: 34.3 G/DL
MCV RBC AUTO: 88 FL
MONOCYTES # BLD AUTO: 0.3 K/UL
MONOCYTES NFR BLD: 7.8 %
NEUTROPHILS # BLD AUTO: 1.9 K/UL
NEUTROPHILS NFR BLD: 44.7 %
NONHDLC SERPL-MCNC: 104 MG/DL
PLATELET # BLD AUTO: 154 K/UL
PMV BLD AUTO: 10.7 FL
PROT SERPL-MCNC: 7.3 G/DL
RBC # BLD AUTO: 5.26 M/UL
TRIGL SERPL-MCNC: 62 MG/DL
VIT B12 SERPL-MCNC: 1236 PG/ML
WBC # BLD AUTO: 4.25 K/UL

## 2018-12-14 PROCEDURE — 3008F BODY MASS INDEX DOCD: CPT | Mod: CPTII,S$GLB,, | Performed by: INTERNAL MEDICINE

## 2018-12-14 PROCEDURE — 87340 HEPATITIS B SURFACE AG IA: CPT

## 2018-12-14 PROCEDURE — 99999 PR PBB SHADOW E&M-EST. PATIENT-LVL III: CPT | Mod: PBBFAC,,, | Performed by: INTERNAL MEDICINE

## 2018-12-14 PROCEDURE — 86704 HEP B CORE ANTIBODY TOTAL: CPT

## 2018-12-14 PROCEDURE — 36415 COLL VENOUS BLD VENIPUNCTURE: CPT

## 2018-12-14 PROCEDURE — 3074F SYST BP LT 130 MM HG: CPT | Mod: CPTII,S$GLB,, | Performed by: INTERNAL MEDICINE

## 2018-12-14 PROCEDURE — 99214 OFFICE O/P EST MOD 30 MIN: CPT | Mod: S$GLB,,, | Performed by: INTERNAL MEDICINE

## 2018-12-14 PROCEDURE — 3079F DIAST BP 80-89 MM HG: CPT | Mod: CPTII,S$GLB,, | Performed by: INTERNAL MEDICINE

## 2018-12-14 PROCEDURE — 86703 HIV-1/HIV-2 1 RESULT ANTBDY: CPT

## 2018-12-14 PROCEDURE — 86706 HEP B SURFACE ANTIBODY: CPT

## 2018-12-14 PROCEDURE — 82607 VITAMIN B-12: CPT

## 2018-12-14 PROCEDURE — 80061 LIPID PANEL: CPT

## 2018-12-14 PROCEDURE — 85025 COMPLETE CBC W/AUTO DIFF WBC: CPT

## 2018-12-14 PROCEDURE — 80076 HEPATIC FUNCTION PANEL: CPT

## 2018-12-14 RX ORDER — LOSARTAN POTASSIUM 50 MG/1
50 TABLET ORAL DAILY
Qty: 90 TABLET | Refills: 3 | Status: SHIPPED | OUTPATIENT
Start: 2018-12-14 | End: 2019-09-04 | Stop reason: SDUPTHER

## 2018-12-14 RX ORDER — KETOCONAZOLE 20 MG/G
CREAM TOPICAL DAILY
Qty: 15 G | Refills: 0 | Status: SHIPPED | OUTPATIENT
Start: 2018-12-14

## 2018-12-14 NOTE — PROGRESS NOTES
"Subjective:   Patient ID: Cuate Fair is a 63 y.o. male  Chief complaint:   Chief Complaint   Patient presents with    Follow-up     HTN       HPI    HTN:   Reports checking intermittently   Reports home readings are higher at 140s/90s - wrist cuff   - reviewed technique and not following correct technique  - taking bp meds in am   - seen by cards - had holter completed   - started CCB 5mg and losartan 50mg and tolerating   - seen by neuro - cartodi US with no significant narrowing  - reviewed notes     HLD: taking statin nightly - tolerating well     B12: taking b12 daily      First episode of Started with rash at left side - itchy x 14 days - unchanged - no blisters and not painful - scratching and itchy      Thrombocytopenia - no bleeding - due for additional albs and agrees to complete today   He reports thinks was dx with hep c many years ago but then saw specialist and 'told not to worry about it'.   -Hep c ab neg  - has worked in hospital setting for many years     Review of Systems    Objective:  Vitals:    12/14/18 1221   BP: 126/84   BP Location: Left arm   Patient Position: Sitting   BP Method: Large (Manual)   Pulse: 70   SpO2: 99%   Weight: 75 kg (165 lb 5.5 oz)   Height: 5' 8" (1.727 m)     Body mass index is 25.14 kg/m².    Physical Exam   Constitutional: He is oriented to person, place, and time. He appears well-developed and well-nourished.   HENT:   Head: Normocephalic and atraumatic.   Eyes: Conjunctivae and EOM are normal.   Neck: Neck supple.   Cardiovascular: Normal rate, regular rhythm and intact distal pulses.   Pulmonary/Chest: Effort normal and breath sounds normal.   Abdominal: Soft. Bowel sounds are normal.   Musculoskeletal: He exhibits no edema or tenderness.   Lymphadenopathy:     He has no cervical adenopathy.   Neurological: He is alert and oriented to person, place, and time.   Skin: Skin is warm and dry.        Psychiatric: He has a normal mood and affect. His behavior " is normal. Judgment and thought content normal.   Vitals reviewed.      Assessment:  1. Essential hypertension    2. Exposure to hepatitis    3. Rash    4. Thrombocytopenia        Plan:  Cuate was seen today for follow-up.    Diagnoses and all orders for this visit:    Essential hypertension  -     losartan (COZAAR) 50 MG tablet; Take 1 tablet (50 mg total) by mouth once daily.  Controlled, cont meds     Exposure to hepatitis  -     Hepatitis B surface antigen; Future  -     Hepatitis B core antibody, total; Future  -     Hepatitis B surface antibody; Future    Rash  -     ketoconazole (NIZORAL) 2 % cream; Apply topically once daily.    Thrombocytopenia  Check labs and will plan to refer to h/o for eval     Health Maintenance   Topic Date Due    Zoster Vaccine  05/14/2017    Lipid Panel  06/12/2019    Colonoscopy  07/26/2020    TETANUS VACCINE  11/09/2028    Hepatitis C Screening  Completed    Influenza Vaccine  Completed

## 2018-12-17 ENCOUNTER — TELEPHONE (OUTPATIENT)
Dept: INTERNAL MEDICINE | Facility: CLINIC | Age: 63
End: 2018-12-17

## 2018-12-17 DIAGNOSIS — D69.6 THROMBOCYTOPENIA: ICD-10-CM

## 2018-12-17 DIAGNOSIS — Z20.5 EXPOSURE TO HEPATITIS B: Primary | ICD-10-CM

## 2018-12-17 LAB
HBV CORE AB SERPL QL IA: POSITIVE
HBV SURFACE AB SER-ACNC: POSITIVE M[IU]/ML
HBV SURFACE AG SERPL QL IA: NEGATIVE
HIV 1+2 AB+HIV1 P24 AG SERPL QL IA: NEGATIVE

## 2018-12-17 NOTE — TELEPHONE ENCOUNTER
Please call pt with results   *Do NOT close encounter unless you speak with pt - do not leave message and close encounter - please draft letter     - labs returned and cbc improved as did B12 level - rec cont B12 and repeat cbc in 3 months to ensure CBC stable in normal range now that B12 def corrected     - liver and cholesterol labs are normal. rec cont cholesterol medication.     - HIV screening is negative     - hepatitis labs for hep B show that pt was infected with hepatitis B in the past and cleared the infection on own and now has immunity to this - I suspect he saw specialist for Hep B infection in past and not hep c as screening for hep C negative     - rec check one other hep b lab when repeat cbc in a few months     - please schedule cbc and hep B e antigen in 3 months - thanks!

## 2018-12-19 ENCOUNTER — TELEPHONE (OUTPATIENT)
Dept: INTERNAL MEDICINE | Facility: CLINIC | Age: 63
End: 2018-12-19

## 2018-12-19 NOTE — TELEPHONE ENCOUNTER
----- Message from Eveline Lau sent at 12/19/2018  9:45 AM CST -----  Contact: pt   Name of Who is Calling: WINIFRED FERNANDEZ [[id number]]      What is the request in detail: Patient is requesting a call from staff in regards to his test results. Please contact to further discuss and advise      Can the clinic reply by [facility]: No       What Number to Call Back if not in [facility]: [phone]

## 2019-01-09 ENCOUNTER — TELEPHONE (OUTPATIENT)
Dept: INTERNAL MEDICINE | Facility: CLINIC | Age: 64
End: 2019-01-09

## 2019-01-09 NOTE — TELEPHONE ENCOUNTER
Please call pt to see if can reach him again with message from 12/17/2018 telephone encounter - please review this information and notify pt of recs and arrange labs

## 2019-03-13 ENCOUNTER — LAB VISIT (OUTPATIENT)
Dept: LAB | Facility: OTHER | Age: 64
End: 2019-03-13
Attending: INTERNAL MEDICINE
Payer: COMMERCIAL

## 2019-03-13 DIAGNOSIS — Z20.5 EXPOSURE TO HEPATITIS B: ICD-10-CM

## 2019-03-13 DIAGNOSIS — D69.6 THROMBOCYTOPENIA: ICD-10-CM

## 2019-03-13 LAB
BASOPHILS # BLD AUTO: 0.02 K/UL
BASOPHILS NFR BLD: 0.5 %
DIFFERENTIAL METHOD: NORMAL
EOSINOPHIL # BLD AUTO: 0.2 K/UL
EOSINOPHIL NFR BLD: 5.1 %
ERYTHROCYTE [DISTWIDTH] IN BLOOD BY AUTOMATED COUNT: 12.9 %
HCT VFR BLD AUTO: 45.6 %
HGB BLD-MCNC: 15.5 G/DL
LYMPHOCYTES # BLD AUTO: 1.6 K/UL
LYMPHOCYTES NFR BLD: 36.3 %
MCH RBC QN AUTO: 30 PG
MCHC RBC AUTO-ENTMCNC: 34 G/DL
MCV RBC AUTO: 88 FL
MONOCYTES # BLD AUTO: 0.4 K/UL
MONOCYTES NFR BLD: 8.8 %
NEUTROPHILS # BLD AUTO: 2.1 K/UL
NEUTROPHILS NFR BLD: 49.3 %
PLATELET # BLD AUTO: 151 K/UL
PMV BLD AUTO: 11.6 FL
RBC # BLD AUTO: 5.16 M/UL
WBC # BLD AUTO: 4.32 K/UL

## 2019-03-13 PROCEDURE — 87350 HEPATITIS BE AG IA: CPT

## 2019-03-13 PROCEDURE — 36415 COLL VENOUS BLD VENIPUNCTURE: CPT

## 2019-03-13 PROCEDURE — 85025 COMPLETE CBC W/AUTO DIFF WBC: CPT

## 2019-03-15 ENCOUNTER — OFFICE VISIT (OUTPATIENT)
Dept: INTERNAL MEDICINE | Facility: CLINIC | Age: 64
End: 2019-03-15
Attending: INTERNAL MEDICINE
Payer: COMMERCIAL

## 2019-03-15 VITALS
BODY MASS INDEX: 25.53 KG/M2 | WEIGHT: 168.44 LBS | SYSTOLIC BLOOD PRESSURE: 123 MMHG | OXYGEN SATURATION: 98 % | HEIGHT: 68 IN | DIASTOLIC BLOOD PRESSURE: 62 MMHG | HEART RATE: 74 BPM

## 2019-03-15 DIAGNOSIS — H61.22 IMPACTED CERUMEN OF LEFT EAR: ICD-10-CM

## 2019-03-15 DIAGNOSIS — I10 ESSENTIAL HYPERTENSION: Primary | ICD-10-CM

## 2019-03-15 DIAGNOSIS — G47.9 SLEEP DISORDER: ICD-10-CM

## 2019-03-15 DIAGNOSIS — E53.8 B12 DEFICIENCY: ICD-10-CM

## 2019-03-15 DIAGNOSIS — F41.8 SITUATIONAL ANXIETY: ICD-10-CM

## 2019-03-15 DIAGNOSIS — E78.00 HYPERCHOLESTEROLEMIA: ICD-10-CM

## 2019-03-15 PROCEDURE — 3008F PR BODY MASS INDEX (BMI) DOCUMENTED: ICD-10-PCS | Mod: CPTII,S$GLB,, | Performed by: INTERNAL MEDICINE

## 2019-03-15 PROCEDURE — 3074F PR MOST RECENT SYSTOLIC BLOOD PRESSURE < 130 MM HG: ICD-10-PCS | Mod: CPTII,S$GLB,, | Performed by: INTERNAL MEDICINE

## 2019-03-15 PROCEDURE — 99214 OFFICE O/P EST MOD 30 MIN: CPT | Mod: S$GLB,,, | Performed by: INTERNAL MEDICINE

## 2019-03-15 PROCEDURE — 3078F PR MOST RECENT DIASTOLIC BLOOD PRESSURE < 80 MM HG: ICD-10-PCS | Mod: CPTII,S$GLB,, | Performed by: INTERNAL MEDICINE

## 2019-03-15 PROCEDURE — 99214 PR OFFICE/OUTPT VISIT, EST, LEVL IV, 30-39 MIN: ICD-10-PCS | Mod: S$GLB,,, | Performed by: INTERNAL MEDICINE

## 2019-03-15 PROCEDURE — 3008F BODY MASS INDEX DOCD: CPT | Mod: CPTII,S$GLB,, | Performed by: INTERNAL MEDICINE

## 2019-03-15 PROCEDURE — 99999 PR PBB SHADOW E&M-EST. PATIENT-LVL IV: CPT | Mod: PBBFAC,,, | Performed by: INTERNAL MEDICINE

## 2019-03-15 PROCEDURE — 3078F DIAST BP <80 MM HG: CPT | Mod: CPTII,S$GLB,, | Performed by: INTERNAL MEDICINE

## 2019-03-15 PROCEDURE — 99999 PR PBB SHADOW E&M-EST. PATIENT-LVL IV: ICD-10-PCS | Mod: PBBFAC,,, | Performed by: INTERNAL MEDICINE

## 2019-03-15 PROCEDURE — 3074F SYST BP LT 130 MM HG: CPT | Mod: CPTII,S$GLB,, | Performed by: INTERNAL MEDICINE

## 2019-03-15 NOTE — PROGRESS NOTES
"Subjective:   Patient ID: Cuate Fair is a 63 y.o. male  Chief complaint:   Chief Complaint   Patient presents with    Hypertension     f/u       HPI     Pt here for HTN f/u:    HTN:   Reports checking intermittently   Reports home readings 120-130/ 60-70 - wrist cuff   - declined dig htn program     - taking bp meds in am   - seen by cards - had holter completed - no further dizziness   - started CCB 5mg and losartan 50mg and tolerating   - seen by neuro - cartodi US with no significant narrowing  - reviewed notes      HLD: taking statin nightly - tolerating well      B12: taking b12 daily       Reports poor sleep - able to fall asleep - trouble staying asleep  - 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[employer], 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  - inc stress at work due to working overtime and covering shifts   - would like to see if sx improve after this week before considering anxiety med  - walking at work and on weekends   - drinks a lot of water and OJ during day and   - racing thoughts if awakens at night   - reports will waken at 3am to urinate and unable to fall back to sleep - does not restrict fluids before bed   - only gets up one time at night to urinate    No caffeine   Review of Systems    Objective:  Vitals:    03/15/19 1400   BP: 123/62   Pulse: 74   SpO2: 98%   Weight: 76.4 kg (168 lb 6.9 oz)   Height: 5' 8" (1.727 m)     Body mass index is 25.61 kg/m².    Physical Exam   Constitutional: He is oriented to person, place, and time. He appears well-developed and well-nourished.   HENT:   Head: Normocephalic and atraumatic.   Eyes: Conjunctivae and EOM are normal.   Neck: Neck supple.   Cardiovascular: Normal rate, regular rhythm and intact distal pulses.   Pulmonary/Chest: Effort normal and breath sounds normal.   Abdominal: Soft. Bowel sounds are normal.   Musculoskeletal: He exhibits no edema or tenderness.   Lymphadenopathy:     He has no cervical adenopathy.   Neurological: He is alert and oriented to " person, place, and time.   Skin: Skin is warm and dry.   Psychiatric: He has a normal mood and affect. His behavior is normal. Judgment and thought content normal.   Vitals reviewed.      Assessment:  1. Essential hypertension    2. Impacted cerumen of left ear    3. Hypercholesterolemia    4. B12 deficiency    5. Situational anxiety    6. Sleep disorder        Plan:  Cuate was seen today for hypertension.    Diagnoses and all orders for this visit:    Essential hypertension  Cont meds - stable     Impacted cerumen of left ear  -     Ambulatory Referral to ENT  Debrox x 1 week   Refer to ent    Hypercholesterolemia  Stable, cont med     B12 deficiency  Stable, cont med     Situational anxiety  Inc exercise   Declined rx   Will let me know if reconsiders     Sleep disorder  Hold liquids 2h before bed   Exercise   Cont good sleep hygiene   Can try benadryl prn   Suspect 2/2 to anxiety - if does not improve then pt will let me know     Health Maintenance   Topic Date Due    Zoster Vaccine  05/14/2017    Lipid Panel  12/14/2019    Colonoscopy  07/26/2020    TETANUS VACCINE  11/09/2028    Hepatitis C Screening  Completed    Influenza Vaccine  Completed

## 2019-03-15 NOTE — PATIENT INSTRUCTIONS
Debrox ear drops to soften ear wax - use for 1 week     Benadryl 25mg 30 min prior to bedtime   Avoid liquids 2h prior to bedtime

## 2019-03-19 LAB — HBV E AG SERPL QL IA: NORMAL

## 2019-03-21 ENCOUNTER — TELEPHONE (OUTPATIENT)
Dept: INTERNAL MEDICINE | Facility: CLINIC | Age: 64
End: 2019-03-21

## 2019-03-21 NOTE — TELEPHONE ENCOUNTER
Please notify pt that hepatitis B lab returned and is negative   Blood counts are negative as discussed in clinic   - all good news!

## 2019-05-02 ENCOUNTER — HOSPITAL ENCOUNTER (EMERGENCY)
Facility: OTHER | Age: 64
Discharge: HOME | End: 2019-05-02
Attending: EMERGENCY MEDICINE
Payer: COMMERCIAL

## 2019-05-02 VITALS
HEART RATE: 69 BPM | DIASTOLIC BLOOD PRESSURE: 86 MMHG | BODY MASS INDEX: 24.99 KG/M2 | HEIGHT: 68 IN | RESPIRATION RATE: 18 BRPM | SYSTOLIC BLOOD PRESSURE: 174 MMHG | OXYGEN SATURATION: 95 % | TEMPERATURE: 98 F | WEIGHT: 164.88 LBS

## 2019-05-02 DIAGNOSIS — H61.22 HEARING LOSS OF LEFT EAR DUE TO CERUMEN IMPACTION: Primary | ICD-10-CM

## 2019-05-02 PROCEDURE — 99283 EMERGENCY DEPT VISIT LOW MDM: CPT

## 2019-05-02 PROCEDURE — 25000003 PHARM REV CODE 250: Performed by: EMERGENCY MEDICINE

## 2019-05-02 PROCEDURE — 69209 REMOVE IMPACTED EAR WAX UNI: CPT | Mod: LT

## 2019-05-02 RX ADMIN — Medication 5 DROP: at 02:05

## 2019-05-02 NOTE — ED PROVIDER NOTES
Encounter Date: 5/2/2019    SCRIBE #1 NOTE: I, Robert Guzman, am scribing for, and in the presence of, Dr. Garza .       History     Chief Complaint   Patient presents with    Cerumen Impaction     x 2 days, states he can usually clear it by pulling on it, but not now     Time seen by provider: 2:08 AM    This is a 63 y.o. male who presents with complaint of decreased hearing in left ear for a week. Patient also reports ringing in the left ear. He denies ear pain or discharge. He has used Debrox with no relief of symptoms. Patient states he experienced hearing loss in the the ears in the past, which resolved with Debrox and cleaning his ears out. He denies fevers, chills, headaches, dizziness, or sore throat. Patient notes he has an appointment with ENT on 5/15.     The history is provided by the patient.     Review of patient's allergies indicates:   Allergen Reactions    Codeine      Past Medical History:   Diagnosis Date    Colon polyp     Hypercholesteremia     Hypertension      Past Surgical History:   Procedure Laterality Date    COLONOSCOPY      TONSILLECTOMY       Family History   Problem Relation Age of Onset    Cancer Mother         breast cancer    No Known Problems Father     No Known Problems Daughter     Hemophilia Son     No Known Problems Son     Cancer Brother         lung CA - related to work exposure     Social History     Tobacco Use    Smoking status: Never Smoker    Smokeless tobacco: Never Used   Substance Use Topics    Alcohol use: No    Drug use: No     Review of Systems   Constitutional: Negative for chills and fever.   HENT: Positive for hearing loss (left) and tinnitus (left). Negative for ear discharge, ear pain and sore throat.    Neurological: Negative for dizziness and headaches.       Physical Exam     Initial Vitals [05/02/19 0153]   BP Pulse Resp Temp SpO2   (!) 174/86 69 18 97.9 °F (36.6 °C) 95 %      MAP       --         Physical Exam    Vitals  reviewed.  Constitutional: He appears well-developed and well-nourished. No distress.   HENT:   Head: Normocephalic and atraumatic.   Mouth/Throat: Oropharynx is clear and moist.   Right TM normal. Left TM impacted with cerumen.    Eyes: Conjunctivae and EOM are normal.   Neck: Normal range of motion. Neck supple.   Musculoskeletal: Normal range of motion.   Neurological: He is alert and oriented to person, place, and time. He has normal strength.   Skin: Skin is warm and dry.   Psychiatric: He has a normal mood and affect. His behavior is normal. Judgment and thought content normal.         ED Course   Procedures  Labs Reviewed - No data to display       Imaging Results    None             Additional MDM:   Comments: 63-year-old male presents complaining of decreased hearing from his left ear.  He attributes this to wax buildup.  On exam he does have cerumen impaction.  Ear was cleaned with moderate amount of cerumen removed.  The patient did report improved hearing.  He was instructed to keep his appointment with ENT which is already scheduled for this month..          Scribe Attestation:   Scribe #1: I performed the above scribed service and the documentation accurately describes the services I performed. I attest to the accuracy of the note.    Attending Attestation:           Physician Attestation for Scribe:  Physician Attestation Statement for Scribe #1: I, Dr. Garza, reviewed documentation, as scribed by Robert Guzman  in my presence, and it is both accurate and complete.                    Clinical Impression:     1. Hearing loss of left ear due to cerumen impaction                                   Oralia Garza MD  05/02/19 8338

## 2019-05-15 ENCOUNTER — OFFICE VISIT (OUTPATIENT)
Dept: OTOLARYNGOLOGY | Facility: CLINIC | Age: 64
End: 2019-05-15
Payer: COMMERCIAL

## 2019-05-15 VITALS
SYSTOLIC BLOOD PRESSURE: 130 MMHG | BODY MASS INDEX: 25.7 KG/M2 | HEART RATE: 56 BPM | WEIGHT: 169 LBS | DIASTOLIC BLOOD PRESSURE: 78 MMHG

## 2019-05-15 DIAGNOSIS — Z87.19 HISTORY OF GASTROESOPHAGEAL REFLUX (GERD): ICD-10-CM

## 2019-05-15 DIAGNOSIS — R19.6 HALITOSIS: ICD-10-CM

## 2019-05-15 DIAGNOSIS — H61.22 HEARING LOSS OF LEFT EAR DUE TO CERUMEN IMPACTION: ICD-10-CM

## 2019-05-15 DIAGNOSIS — Z01.10 ENCOUNTER FOR HEARING EVALUATION: Primary | ICD-10-CM

## 2019-05-15 PROCEDURE — 69210 PR REMOVAL IMPACTED CERUMEN REQUIRING INSTRUMENTATION, UNILATERAL: ICD-10-PCS | Mod: S$GLB,,, | Performed by: OTOLARYNGOLOGY

## 2019-05-15 PROCEDURE — 99999 PR PBB SHADOW E&M-EST. PATIENT-LVL IV: ICD-10-PCS | Mod: PBBFAC,,, | Performed by: OTOLARYNGOLOGY

## 2019-05-15 PROCEDURE — 3008F PR BODY MASS INDEX (BMI) DOCUMENTED: ICD-10-PCS | Mod: CPTII,S$GLB,, | Performed by: OTOLARYNGOLOGY

## 2019-05-15 PROCEDURE — 99203 OFFICE O/P NEW LOW 30 MIN: CPT | Mod: 25,S$GLB,, | Performed by: OTOLARYNGOLOGY

## 2019-05-15 PROCEDURE — 69210 REMOVE IMPACTED EAR WAX UNI: CPT | Mod: S$GLB,,, | Performed by: OTOLARYNGOLOGY

## 2019-05-15 PROCEDURE — 3075F SYST BP GE 130 - 139MM HG: CPT | Mod: CPTII,S$GLB,, | Performed by: OTOLARYNGOLOGY

## 2019-05-15 PROCEDURE — 99999 PR PBB SHADOW E&M-EST. PATIENT-LVL IV: CPT | Mod: PBBFAC,,, | Performed by: OTOLARYNGOLOGY

## 2019-05-15 PROCEDURE — 3075F PR MOST RECENT SYSTOLIC BLOOD PRESS GE 130-139MM HG: ICD-10-PCS | Mod: CPTII,S$GLB,, | Performed by: OTOLARYNGOLOGY

## 2019-05-15 PROCEDURE — 3078F DIAST BP <80 MM HG: CPT | Mod: CPTII,S$GLB,, | Performed by: OTOLARYNGOLOGY

## 2019-05-15 PROCEDURE — 3078F PR MOST RECENT DIASTOLIC BLOOD PRESSURE < 80 MM HG: ICD-10-PCS | Mod: CPTII,S$GLB,, | Performed by: OTOLARYNGOLOGY

## 2019-05-15 PROCEDURE — 3008F BODY MASS INDEX DOCD: CPT | Mod: CPTII,S$GLB,, | Performed by: OTOLARYNGOLOGY

## 2019-05-15 PROCEDURE — 99203 PR OFFICE/OUTPT VISIT, NEW, LEVL III, 30-44 MIN: ICD-10-PCS | Mod: 25,S$GLB,, | Performed by: OTOLARYNGOLOGY

## 2019-05-15 NOTE — PATIENT INSTRUCTIONS
Cerumen plug removed from AS eac  [facility] article on halitosis provided  Trial of Anti GERD diet  Trial of Flonase NSS; one spray @ nostril once a day x 3 weeks  Consider vitamin discontinuation for a while  Office endoscopy on return prn  Audiometry on return  prn

## 2019-05-15 NOTE — PROGRESS NOTES
"Subjective:       Patient ID: Cuate Fair is a 64 y.o. male.    Chief Complaint: Cerumen Impaction and Other (issues with breathe )    HPI: Mr. Fair is a  64-year-old  male whose hand written reason for the visit today is "ear open ( can't hear) and bad breath / wax in left ear "  He presented to the ED 05/02/2019 for acute hearing loss related to a cerumen impaction in his left ear which he could not get to open up by pulling on his ear.  He had indicated decreased hearing in his left ear for a week.   He also indicated ringing in his left ear.  He denied ear pain or ear discharge.  He had utilized Debrox without relief of symptoms.  He had already made appointment with the ENT department for today.    Physical exam indicated a left ear canal cerumen impaction which was partially removed at the visit. He was instructed to keep the appointment with the ENT service.    He also indicates concern with  halitosis.   He says he was referred here for evaluation of this by his PCP Dr. Beth.   Some family members have commented on his breath.  People move away from him at social events when he speaks with them.  A dentist indicated that he had a tooth problem.  He indicates a previous history of GERD symptoms treated with Nexium.  He denies seasonal allergies although his wife and son suffer with them.  He has some nasal sx while cutting the grass.      PMH:  High blood pressure, high cholesterol, BPH, GERD, thrombocytopenia, transient memory loss, situational anxiety  Past Surgical History:   Procedure Laterality Date    COLONOSCOPY      TONSILLECTOMY      Family history:  High blood pressure, high cholesterol, hemophilia   Allergies: Codeine   Habits:  The patient denies tobacco or alcohol or caffeine use   Occupation:      Review of Systems   Ears: Positive for hearing loss, ear pain, ear pressure, ringing in ear and dizziness.    Mouth/Throat: Positive for hoarse voice. "   Cardiovascular:  Positive for history of high blood pressure.   Gastrointestinal:  Positive for acid reflux.   Other:  Positive for depression and anxiety. Negative for rash.     Current Outpatient Medications on File Prior to Visit   Medication Sig Dispense Refill    amLODIPine (NORVASC) 5 MG tablet Take 1 tablet (5 mg total) by mouth once daily. One-2 per day or as directed 90 tablet 3    atorvastatin (LIPITOR) 40 MG tablet Take 1 tablet (40 mg total) by mouth once daily. 90 tablet 1    cyanocobalamin (VITAMIN B-12) 1000 MCG tablet Take 1 tablet (1,000 mcg total) by mouth once daily.      ketoconazole (NIZORAL) 2 % cream Apply topically once daily. 15 g 0    losartan (COZAAR) 50 MG tablet Take 1 tablet (50 mg total) by mouth once daily. 90 tablet 3    aspirin (ECOTRIN) 81 MG EC tablet Take 1 tablet (81 mg total) by mouth once daily. 30 tablet 0     No current facility-administered medications on file prior to visit.      Audiometry was not performed today.        Objective:     Blood pressure 130/78 pulse 58 height 5 ft 8 in weight 169 lb   General: Alert and oriented gentleman in no acute distress; he is wearing a [place] cap   Both ears were examined under the microscope in the micro procedure room  Physical Exam   Constitutional: He is oriented to person, place, and time. He appears well-developed and well-nourished.   HENT:   Head: Normocephalic.   Right Ear: Hearing, tympanic membrane and ear canal normal. No drainage. No foreign bodies. No mastoid tenderness. Tympanic membrane is not perforated. No decreased hearing is noted.   Left Ear: Hearing, tympanic membrane and ear canal normal. No drainage. No foreign bodies. No mastoid tenderness. Tympanic membrane is not perforated. No decreased hearing is noted.   Ears:    Nose: No nose lacerations, nasal deformity, septal deviation or nasal septal hematoma. No epistaxis. Right sinus exhibits no maxillary sinus tenderness and no frontal sinus tenderness.  Left sinus exhibits no maxillary sinus tenderness and no frontal sinus tenderness.       Mouth/Throat: Uvula is midline, oropharynx is clear and moist and mucous membranes are normal. He does not have dentures. No oral lesions. No trismus in the jaw. No uvula swelling or dental caries. No oropharyngeal exudate or tonsillar abscesses.       Neck: No thyromegaly present.   Pulmonary/Chest: Effort normal. No stridor.   Lymphadenopathy:     He has no cervical adenopathy.   Neurological: He is alert and oriented to person, place, and time.   Skin: No rash noted.   Psychiatric: His behavior is normal.       Assessment:       1. Encounter for hearing evaluation    2. Hearing loss of left ear due to cerumen impaction ; large occlusive C.I. extracted from length of AS eac   3. Halitosis    4. History of gastroesophageal reflux (GERD)        Plan:       Cerumen plug removed from AS eac  [facility] article on halitosis provided  Trial of Anti GERD diet  Trial of Flonase NSS; one spray @ nostril once a day x 3 weeks  Consider vitamin discontinuation for a while  Office endoscopy on return prn  Audiometry on return  prn

## 2019-05-15 NOTE — LETTER
May 16, 2019      Carolyne Beth MD  [address]  [place] [address]           [facility]  [address]  [place] [address]  Phone: [phone]  Fax: [phone]          Patient: Cuate Fair   MR Number: [id number]   YOB: 1955   Date of Visit: 5/15/2019       Dear Dr. Carolyne Beth:    Thank you for referring Cuate Fair to me for evaluation. Attached you will find relevant portions of my assessment and plan of care.    If you have questions, please do not hesitate to call me. I look forward to following Cuate Fair along with you.    Sincerely,    Christopher Matta III, MD    Enclosure  CC:  No Recipients    If you would like to receive this communication electronically, please contact [email or web address] or [phone] to request more information on [employer] Link access.    For providers and/or their staff who would like to refer a patient to [facility], please contact us through our one-stop-shop provider referral line, [facility], at [phone].    If you feel you have received this communication in error or would no longer like to receive these types of communications, please e-mail [email or web address]

## 2019-06-13 DIAGNOSIS — E78.5 HYPERLIPIDEMIA, UNSPECIFIED HYPERLIPIDEMIA TYPE: ICD-10-CM

## 2019-06-13 NOTE — TELEPHONE ENCOUNTER
----- Message from Lizzie Martinez sent at 6/13/2019  4:39 PM CDT -----  Contact: Self  Can the clinic reply in [facility]: N    Please refill the medication(s) listed below. Please call the patient when the prescription(s) is ready for  at this phone number      Medication #1 atorvastatin (LIPITOR) 40 MG tablet    Medication #2     Preferred Pharmacy:  [facility] [address] [place], [place] [address]L[facility] BILL AT [facility] & PRESS [phone] (Phone)  [phone] (Fax)

## 2019-06-14 RX ORDER — ATORVASTATIN CALCIUM 40 MG/1
40 TABLET, FILM COATED ORAL DAILY
Qty: 90 TABLET | Refills: 3 | Status: SHIPPED | OUTPATIENT
Start: 2019-06-14 | End: 2019-09-04 | Stop reason: SDUPTHER

## 2019-07-07 DIAGNOSIS — E78.00 HYPERCHOLESTEROLEMIA: ICD-10-CM

## 2019-07-07 DIAGNOSIS — I10 HTN (HYPERTENSION), BENIGN: ICD-10-CM

## 2019-07-07 RX ORDER — AMLODIPINE BESYLATE 5 MG/1
TABLET ORAL
Qty: 90 TABLET | Refills: 0 | Status: SHIPPED | OUTPATIENT
Start: 2019-07-07 | End: 2019-09-04 | Stop reason: SDUPTHER

## 2019-08-08 DIAGNOSIS — I10 ESSENTIAL HYPERTENSION: ICD-10-CM

## 2019-08-14 RX ORDER — LOSARTAN POTASSIUM 100 MG/1
TABLET ORAL
Qty: 90 TABLET | Refills: 0 | OUTPATIENT
Start: 2019-08-14

## 2019-08-22 ENCOUNTER — OFFICE VISIT (OUTPATIENT)
Dept: INTERNAL MEDICINE | Facility: CLINIC | Age: 64
End: 2019-08-22
Payer: COMMERCIAL

## 2019-08-22 VITALS
OXYGEN SATURATION: 98 % | HEIGHT: 68 IN | HEART RATE: 77 BPM | SYSTOLIC BLOOD PRESSURE: 115 MMHG | WEIGHT: 167.31 LBS | BODY MASS INDEX: 25.36 KG/M2 | DIASTOLIC BLOOD PRESSURE: 73 MMHG

## 2019-08-22 DIAGNOSIS — M79.605 LEFT LEG PAIN: Primary | ICD-10-CM

## 2019-08-22 DIAGNOSIS — M79.662 PAIN AND SWELLING OF LEFT LOWER LEG: ICD-10-CM

## 2019-08-22 DIAGNOSIS — M79.89 PAIN AND SWELLING OF LEFT LOWER LEG: ICD-10-CM

## 2019-08-22 PROCEDURE — 99213 OFFICE O/P EST LOW 20 MIN: CPT | Mod: S$GLB,,, | Performed by: NURSE PRACTITIONER

## 2019-08-22 PROCEDURE — 99999 PR PBB SHADOW E&M-EST. PATIENT-LVL III: CPT | Mod: PBBFAC,,, | Performed by: NURSE PRACTITIONER

## 2019-08-22 PROCEDURE — 99999 PR PBB SHADOW E&M-EST. PATIENT-LVL III: ICD-10-PCS | Mod: PBBFAC,,, | Performed by: NURSE PRACTITIONER

## 2019-08-22 PROCEDURE — 3008F BODY MASS INDEX DOCD: CPT | Mod: CPTII,S$GLB,, | Performed by: NURSE PRACTITIONER

## 2019-08-22 PROCEDURE — 3074F SYST BP LT 130 MM HG: CPT | Mod: CPTII,S$GLB,, | Performed by: NURSE PRACTITIONER

## 2019-08-22 PROCEDURE — 3078F PR MOST RECENT DIASTOLIC BLOOD PRESSURE < 80 MM HG: ICD-10-PCS | Mod: CPTII,S$GLB,, | Performed by: NURSE PRACTITIONER

## 2019-08-22 PROCEDURE — 3078F DIAST BP <80 MM HG: CPT | Mod: CPTII,S$GLB,, | Performed by: NURSE PRACTITIONER

## 2019-08-22 PROCEDURE — 3074F PR MOST RECENT SYSTOLIC BLOOD PRESSURE < 130 MM HG: ICD-10-PCS | Mod: CPTII,S$GLB,, | Performed by: NURSE PRACTITIONER

## 2019-08-22 PROCEDURE — 99213 PR OFFICE/OUTPT VISIT, EST, LEVL III, 20-29 MIN: ICD-10-PCS | Mod: S$GLB,,, | Performed by: NURSE PRACTITIONER

## 2019-08-22 PROCEDURE — 3008F PR BODY MASS INDEX (BMI) DOCUMENTED: ICD-10-PCS | Mod: CPTII,S$GLB,, | Performed by: NURSE PRACTITIONER

## 2019-08-22 RX ORDER — SULFAMETHOXAZOLE AND TRIMETHOPRIM 800; 160 MG/1; MG/1
1 TABLET ORAL DAILY
Qty: 10 TABLET | Refills: 0 | Status: SHIPPED | OUTPATIENT
Start: 2019-08-22 | End: 2019-09-01

## 2019-08-22 RX ORDER — SULFAMETHOXAZOLE AND TRIMETHOPRIM 800; 160 MG/1; MG/1
1 TABLET ORAL DAILY
Qty: 7 TABLET | Refills: 0 | Status: SHIPPED | OUTPATIENT
Start: 2019-08-22 | End: 2019-08-22

## 2019-08-22 NOTE — PATIENT INSTRUCTIONS
Venous U/S of left leg (appointment scheduled).  Bactrim DS 1 tab daily x 10 days.  You must understand that you have received an Urgent Care treatment only and that you may be released before all of your medical problems are known or treated. You, the patient, will arrange for follow up care as instructed.   F/U with PCP (appointment scheduled).  RTC prn.

## 2019-08-22 NOTE — PROGRESS NOTES
Subjective:       Patient ID: Cuate Fair is a 64 y.o. male.    Chief Complaint: Leg Pain    Mass   This is a new (LLE) problem. The current episode started 1 to 4 weeks ago (2 weeks). The problem occurs intermittently. The problem has been unchanged. Pertinent negatives include no abdominal pain, anorexia, arthralgias, change in bowel habit, chest pain, chills, congestion, coughing, diaphoresis, fatigue, fever, headaches, joint swelling, myalgias, nausea, neck pain, numbness, rash, sore throat, swollen glands, urinary symptoms, vertigo, visual change, vomiting or weakness. The symptoms are aggravated by walking. He has tried rest (elevating leg) for the symptoms. The treatment provided significant relief.        Past Medical History: Patient has a past medical history of Colon polyp, Hypercholesteremia, and Hypertension.    Past Surgical History: Patient has a past surgical history that includes Colonoscopy and Tonsillectomy.    Social History: Patient reports that he has never smoked. He has never used smokeless tobacco. He reports that he does not drink alcohol or use drugs.    Family History: family history includes Cancer in his brother and mother; Hemophilia in his son; No Known Problems in his daughter, father, and son.    Medications:   Current Outpatient Medications   Medication Sig    amLODIPine (NORVASC) 5 MG tablet TAKE 1 TABLET BY MOUTH EVERY DAY    atorvastatin (LIPITOR) 40 MG tablet Take 1 tablet (40 mg total) by mouth once daily.    cyanocobalamin (VITAMIN B-12) 1000 MCG tablet Take 1 tablet (1,000 mcg total) by mouth once daily.    losartan (COZAAR) 50 MG tablet Take 1 tablet (50 mg total) by mouth once daily.    aspirin (ECOTRIN) 81 MG EC tablet Take 1 tablet (81 mg total) by mouth once daily.    ketoconazole (NIZORAL) 2 % cream Apply topically once daily.    sulfamethoxazole-trimethoprim 800-160mg (BACTRIM DS) 800-160 mg Tab Take 1 tablet by mouth once daily. for 10 days     No  "current facility-administered medications for this visit.        Allergies: Patient is allergic to codeine.    Review of Systems   Constitutional: Negative for activity change, chills, diaphoresis, fatigue, fever and unexpected weight change.   HENT: Negative for congestion, dental problem, ear discharge, ear pain, facial swelling, hearing loss, mouth sores, nosebleeds, postnasal drip, rhinorrhea, sinus pressure, sneezing, sore throat, tinnitus, trouble swallowing and voice change.    Eyes: Negative for pain and visual disturbance.   Respiratory: Negative for cough, choking, chest tightness, shortness of breath, wheezing and stridor.    Cardiovascular: Negative for chest pain.   Gastrointestinal: Negative for abdominal pain, anorexia, change in bowel habit, nausea and vomiting.   Musculoskeletal: Negative for arthralgias, gait problem, joint swelling, myalgias, neck pain and neck stiffness.        Leg pain   Skin: Negative for color change, rash and wound.   Allergic/Immunologic: Negative for environmental allergies.   Neurological: Negative for dizziness, vertigo, seizures, syncope, facial asymmetry, speech difficulty, weakness, light-headedness, numbness and headaches.   Psychiatric/Behavioral: Negative for agitation, confusion and decreased concentration. The patient is not nervous/anxious.        Objective:       /73 (BP Location: Left arm, Patient Position: Sitting)   Pulse 77   Ht 5' 8" (1.727 m)   Wt 75.9 kg (167 lb 5.3 oz)   SpO2 98%   BMI 25.44 kg/m²     Physical Exam   Constitutional: He is oriented to person, place, and time. He appears well-developed and well-nourished.   HENT:   Head: Normocephalic and atraumatic. Not macrocephalic and not microcephalic. Head is without raccoon's eyes, without Perera's sign, without abrasion, without contusion, without laceration, without right periorbital erythema and without left periorbital erythema. Hair is normal.   Right Ear: No lacerations. No " drainage, swelling or tenderness. No foreign bodies. No mastoid tenderness. Tympanic membrane is not injected, not scarred, not perforated, not erythematous, not retracted and not bulging. Tympanic membrane mobility is normal. No middle ear effusion. No hemotympanum. No decreased hearing is noted.   Left Ear: No lacerations. No drainage, swelling or tenderness. No foreign bodies. No mastoid tenderness. Tympanic membrane is not injected, not scarred, not perforated, not erythematous, not retracted and not bulging. Tympanic membrane mobility is normal.  No middle ear effusion. No hemotympanum. No decreased hearing is noted.   Nose: Nose normal. No mucosal edema, rhinorrhea, nose lacerations, sinus tenderness or nasal deformity.   Mouth/Throat: Uvula is midline.   Eyes: Conjunctivae and lids are normal. No scleral icterus.   Neck: Trachea normal. Neck supple. No spinous process tenderness and no muscular tenderness present. No neck rigidity. No edema, no erythema and normal range of motion present. No thyroid mass and no thyromegaly present.   Cardiovascular: Normal rate, regular rhythm, normal heart sounds and intact distal pulses. Exam reveals no gallop and no friction rub.   No murmur heard.  Pulmonary/Chest: Effort normal and breath sounds normal. No stridor. No respiratory distress. He has no wheezes. He has no rales. He exhibits no tenderness.   Abdominal: Soft. Bowel sounds are normal. He exhibits no distension.   Musculoskeletal: Normal range of motion. He exhibits tenderness.        Legs:  Lymphadenopathy:        Head (right side): No submental, no submandibular, no tonsillar, no preauricular and no posterior auricular adenopathy present.        Head (left side): No submental, no submandibular, no tonsillar, no preauricular, no posterior auricular and no occipital adenopathy present.   Neurological: He is alert and oriented to person, place, and time.   Skin: Skin is warm and dry.   Psychiatric: He has a  normal mood and affect. His behavior is normal. Judgment and thought content normal.   Vitals reviewed.      Assessment:       1. Left leg pain    2. Pain and swelling of left lower leg        Plan:       Venous U/S of left leg (appointment scheduled).  Bactrim DS 1 tab daily x 10 days.  You must understand that you have received an Urgent Care treatment only and that you may be released before all of your medical problems are known or treated. You, the patient, will arrange for follow up care as instructed.   F/U with PCP (appointment scheduled).  RTC prn.

## 2019-08-23 ENCOUNTER — HOSPITAL ENCOUNTER (OUTPATIENT)
Dept: RADIOLOGY | Facility: OTHER | Age: 64
Discharge: HOME | End: 2019-08-23
Attending: NURSE PRACTITIONER
Payer: COMMERCIAL

## 2019-08-23 DIAGNOSIS — M79.605 LEFT LEG PAIN: ICD-10-CM

## 2019-08-23 PROCEDURE — 93971 EXTREMITY STUDY: CPT | Mod: 26,LT,, | Performed by: RADIOLOGY

## 2019-08-23 PROCEDURE — 93971 US LOWER EXTREMITY VEINS LEFT: ICD-10-PCS | Mod: 26,LT,, | Performed by: RADIOLOGY

## 2019-08-23 PROCEDURE — 93971 EXTREMITY STUDY: CPT | Mod: TC,LT

## 2019-08-26 ENCOUNTER — TELEPHONE (OUTPATIENT)
Dept: INTERNAL MEDICINE | Facility: CLINIC | Age: 64
End: 2019-08-26

## 2019-09-04 ENCOUNTER — OFFICE VISIT (OUTPATIENT)
Dept: INTERNAL MEDICINE | Facility: CLINIC | Age: 64
End: 2019-09-04
Attending: INTERNAL MEDICINE
Payer: COMMERCIAL

## 2019-09-04 ENCOUNTER — TELEPHONE (OUTPATIENT)
Dept: INTERNAL MEDICINE | Facility: CLINIC | Age: 64
End: 2019-09-04

## 2019-09-04 VITALS
BODY MASS INDEX: 25.23 KG/M2 | HEART RATE: 77 BPM | HEIGHT: 68 IN | OXYGEN SATURATION: 97 % | SYSTOLIC BLOOD PRESSURE: 130 MMHG | WEIGHT: 166.44 LBS | DIASTOLIC BLOOD PRESSURE: 80 MMHG

## 2019-09-04 DIAGNOSIS — I10 ESSENTIAL HYPERTENSION: ICD-10-CM

## 2019-09-04 DIAGNOSIS — R35.1 NOCTURIA: ICD-10-CM

## 2019-09-04 DIAGNOSIS — E53.8 B12 DEFICIENCY: ICD-10-CM

## 2019-09-04 DIAGNOSIS — E78.5 HYPERLIPIDEMIA, UNSPECIFIED HYPERLIPIDEMIA TYPE: ICD-10-CM

## 2019-09-04 DIAGNOSIS — F41.8 SITUATIONAL ANXIETY: ICD-10-CM

## 2019-09-04 DIAGNOSIS — R22.9 SKIN MASS: ICD-10-CM

## 2019-09-04 DIAGNOSIS — I10 HTN (HYPERTENSION), BENIGN: ICD-10-CM

## 2019-09-04 DIAGNOSIS — E78.00 HYPERCHOLESTEROLEMIA: ICD-10-CM

## 2019-09-04 DIAGNOSIS — Z12.5 PROSTATE CANCER SCREENING: ICD-10-CM

## 2019-09-04 DIAGNOSIS — Z00.00 ANNUAL PHYSICAL EXAM: Primary | ICD-10-CM

## 2019-09-04 PROBLEM — R41.3 TRANSIENT MEMORY LOSS: Status: RESOLVED | Noted: 2018-09-18 | Resolved: 2019-09-04

## 2019-09-04 PROBLEM — D70.9 NEUTROPENIA: Status: RESOLVED | Noted: 2018-06-12 | Resolved: 2019-09-04

## 2019-09-04 PROBLEM — D69.6 THROMBOCYTOPENIA: Status: RESOLVED | Noted: 2018-06-12 | Resolved: 2019-09-04

## 2019-09-04 PROCEDURE — 3079F DIAST BP 80-89 MM HG: CPT | Mod: CPTII,S$GLB,, | Performed by: INTERNAL MEDICINE

## 2019-09-04 PROCEDURE — 99999 PR PBB SHADOW E&M-EST. PATIENT-LVL IV: CPT | Mod: PBBFAC,,, | Performed by: INTERNAL MEDICINE

## 2019-09-04 PROCEDURE — 99396 PR PREVENTIVE VISIT,EST,40-64: ICD-10-PCS | Mod: S$GLB,,, | Performed by: INTERNAL MEDICINE

## 2019-09-04 PROCEDURE — 3075F SYST BP GE 130 - 139MM HG: CPT | Mod: CPTII,S$GLB,, | Performed by: INTERNAL MEDICINE

## 2019-09-04 PROCEDURE — 3075F PR MOST RECENT SYSTOLIC BLOOD PRESS GE 130-139MM HG: ICD-10-PCS | Mod: CPTII,S$GLB,, | Performed by: INTERNAL MEDICINE

## 2019-09-04 PROCEDURE — 99396 PREV VISIT EST AGE 40-64: CPT | Mod: S$GLB,,, | Performed by: INTERNAL MEDICINE

## 2019-09-04 PROCEDURE — 99999 PR PBB SHADOW E&M-EST. PATIENT-LVL IV: ICD-10-PCS | Mod: PBBFAC,,, | Performed by: INTERNAL MEDICINE

## 2019-09-04 PROCEDURE — 3079F PR MOST RECENT DIASTOLIC BLOOD PRESSURE 80-89 MM HG: ICD-10-PCS | Mod: CPTII,S$GLB,, | Performed by: INTERNAL MEDICINE

## 2019-09-04 RX ORDER — LOSARTAN POTASSIUM 50 MG/1
50 TABLET ORAL DAILY
Qty: 90 TABLET | Refills: 3 | Status: SHIPPED | OUTPATIENT
Start: 2019-09-04 | End: 2020-01-08 | Stop reason: SDUPTHER

## 2019-09-04 RX ORDER — SULFAMETHOXAZOLE AND TRIMETHOPRIM 800; 160 MG/1; MG/1
1 TABLET ORAL 2 TIMES DAILY
Qty: 14 TABLET | Refills: 0 | Status: SHIPPED | OUTPATIENT
Start: 2019-09-04 | End: 2019-09-11

## 2019-09-04 RX ORDER — ATORVASTATIN CALCIUM 40 MG/1
40 TABLET, FILM COATED ORAL DAILY
Qty: 90 TABLET | Refills: 3 | Status: SHIPPED | OUTPATIENT
Start: 2019-09-04 | End: 2020-01-08 | Stop reason: SDUPTHER

## 2019-09-04 RX ORDER — AMLODIPINE BESYLATE 5 MG/1
5 TABLET ORAL DAILY
Qty: 90 TABLET | Refills: 3 | Status: SHIPPED | OUTPATIENT
Start: 2019-09-04 | End: 2020-01-08 | Stop reason: SDUPTHER

## 2019-09-04 NOTE — PATIENT INSTRUCTIONS
In pharmacy:   shingrix vaccine  Flu vaccine       Soft tissue US   Start Bactrim   Schedule appt with derm in 2 weeks if area not completely resolved to discuss possible biopsy

## 2019-09-04 NOTE — TELEPHONE ENCOUNTER
----- Message from Leslee Villar sent at 9/4/2019 10:43 AM CDT -----  Contact: WINIFRED FERNANDEZ [[id number]]  Name of Who is Calling : WINIFRED FERNANDEZ [[id number]]    What is the request in detail :     Patient is requesting a call from staff he would like to be seen later in the day if possible for his scheduled appointment   .....Please contact to further discuss and advise.    Can the clinic reply by [facility] :  Phone call only    What Number to Call Back : [phone]

## 2019-09-04 NOTE — PROGRESS NOTES
Subjective:   Patient ID: Cuate Fair is a 64 y.o. male  Chief complaint:   Chief Complaint   Patient presents with    Annual Exam       HPI  here for annual exam:     HTN:   Reports checking intermittently   Reports home readings 120-130/ 60-70 - wrist cuff   - declined dig htn program   - taking bp meds in am - amlodipine 5mg and losartan 50mg    At lcv:   - seen by cards - had holter completed - no further dizziness   - started CCB 5mg and losartan 50mg and tolerating   - seen by neuro - cartodid US with no significant narrowing  - reviewed notes      HLD: taking statin nightly - tolerating well      B12: taking b12 daily - level improved       No family hx of prostate cancer - reviewed prostate cancer screening recommendations  Reports urinates 2 times per night but also drinks most of his daily fluids bt 7p and 9p at night just before bedtime   - No intermittency or hesitancy, weak stream, incontinence  No increased urgency, increased frequency, burning with urination, hematuria, foul smelling urine, cloudy urine, lower back pain, suprapubic pain or pressure.  No pain with defication or testicular pain. No penile discharge   No fevers or chills  No etoh or caffeine    HM: rec flu vaccine and shingrix     Seen by NP for mass on leg 8/22  Reports area on left medial calf was enlarged - never took abx for this   - US neg for clot  - sx started at beginnign of august with more activity/ long periods of standing  - area at left medial calf was red, tender, swollen at initial appt 8/22 and this has gradually improved on own since then   - first episode  - no fevers or chills   - no trauma or inciting event that he recalls   - no new detergents or skin products   Area has improved - centrally no redness, or edema   Still with palpable firm areas at distal and prox end   - nontender to touch   - no calf ttp      Review of Systems    Objective:  Vitals:    09/04/19 1204   BP: 130/80   Pulse: 77   SpO2: 97%  "  Weight: 75.5 kg (166 lb 7.2 oz)   Height: 5' 8" (1.727 m)     Body mass index is 25.31 kg/m².    Physical Exam   Constitutional: He is oriented to person, place, and time. He appears well-developed and well-nourished.   HENT:   Head: Normocephalic and atraumatic.   Right Ear: External ear normal.   Left Ear: External ear normal.   Nose: Nose normal.   Mouth/Throat: Oropharynx is clear and moist.   No carotid bruits   Eyes: Conjunctivae and EOM are normal.   Neck: Neck supple. No thyromegaly present.   Cardiovascular: Normal rate, regular rhythm, normal heart sounds and intact distal pulses.   Pulmonary/Chest: Effort normal and breath sounds normal.   Abdominal: Soft. Bowel sounds are normal.   Musculoskeletal: He exhibits no edema or tenderness.   Left medial calf with:    - centrally no redness or edema or ttp   Still with palpable 1cm round firm areas at distal and proximal area with mild erythema, no  Inc warmth or ttp   - nontender to touch   - no calf ttp   - no skin breakdown    Lymphadenopathy:     He has no cervical adenopathy.   Neurological: He is alert and oriented to person, place, and time.   Skin: Skin is warm and dry.   Psychiatric: His behavior is normal. Thought content normal.   Vitals reviewed.    Assessment:  1. Annual physical exam    2. Essential hypertension    3. Situational anxiety    4. HTN (hypertension), benign    5. Hypercholesterolemia    6. Hyperlipidemia, unspecified hyperlipidemia type    7. B12 deficiency    8. Nocturia    9. Prostate cancer screening    10. Skin mass        Plan:  Cuate was seen today for annual exam.    Diagnoses and all orders for this visit:    Annual physical exam  -     Lipid panel; Future  -     CBC auto differential; Future  -     Comprehensive metabolic panel; Future  Recommend daily sunscreen, cardiovascular exercise min 30 min 5 days per week. Seatbelts routinely.    Essential hypertension  -     losartan (COZAAR) 50 MG tablet; Take 1 tablet (50 mg " total) by mouth once daily.    Situational anxiety  Resolved     HTN (hypertension), benign  -     amLODIPine (NORVASC) 5 MG tablet; Take 1 tablet (5 mg total) by mouth once daily.  Controlled   Hypercholesterolemia  -     amLODIPine (NORVASC) 5 MG tablet; Take 1 tablet (5 mg total) by mouth once daily.  Controlled, cont med     Hyperlipidemia, unspecified hyperlipidemia type  -     atorvastatin (LIPITOR) 40 MG tablet; Take 1 tablet (40 mg total) by mouth once daily.    B12 deficiency  -     Vitamin B12; Future  Improved, cont suppl    Nocturia  Hold fluids 2h before bed as this is likely culprit   Check psa     Prostate cancer screening  -     PSA, Screening; Future    Skin mass  -     Ambulatory referral to Dermatology  -     US Soft Tissue Misc; Future  Et unclear - start bactrim and complete full course   - if not resolved then will f/uw with me in 2 weeks for check and complete US and f/u with derm for possible bx   US neg for DVT   No clear hx of trauma   Er and rtc prompts given     Health Maintenance   Topic Date Due    Lipid Panel  12/14/2019    Colonoscopy  07/26/2020    TETANUS VACCINE  11/09/2028    Hepatitis C Screening  Completed

## 2019-09-30 ENCOUNTER — OFFICE VISIT (OUTPATIENT)
Dept: INTERNAL MEDICINE | Facility: CLINIC | Age: 64
End: 2019-09-30
Attending: FAMILY MEDICINE
Payer: COMMERCIAL

## 2019-09-30 VITALS
BODY MASS INDEX: 25.53 KG/M2 | SYSTOLIC BLOOD PRESSURE: 114 MMHG | WEIGHT: 168.44 LBS | HEIGHT: 68 IN | HEART RATE: 76 BPM | OXYGEN SATURATION: 97 % | DIASTOLIC BLOOD PRESSURE: 70 MMHG

## 2019-09-30 DIAGNOSIS — I10 HTN (HYPERTENSION), BENIGN: ICD-10-CM

## 2019-09-30 DIAGNOSIS — L24.7 CONTACT DERMATITIS AND ECZEMA DUE TO PLANT: Primary | ICD-10-CM

## 2019-09-30 DIAGNOSIS — E78.00 HYPERCHOLESTEROLEMIA: ICD-10-CM

## 2019-09-30 PROCEDURE — 3074F SYST BP LT 130 MM HG: CPT | Mod: CPTII,S$GLB,, | Performed by: FAMILY MEDICINE

## 2019-09-30 PROCEDURE — 99999 PR PBB SHADOW E&M-EST. PATIENT-LVL III: ICD-10-PCS | Mod: PBBFAC,,, | Performed by: FAMILY MEDICINE

## 2019-09-30 PROCEDURE — 3008F PR BODY MASS INDEX (BMI) DOCUMENTED: ICD-10-PCS | Mod: CPTII,S$GLB,, | Performed by: FAMILY MEDICINE

## 2019-09-30 PROCEDURE — 3078F PR MOST RECENT DIASTOLIC BLOOD PRESSURE < 80 MM HG: ICD-10-PCS | Mod: CPTII,S$GLB,, | Performed by: FAMILY MEDICINE

## 2019-09-30 PROCEDURE — 99214 PR OFFICE/OUTPT VISIT, EST, LEVL IV, 30-39 MIN: ICD-10-PCS | Mod: S$GLB,,, | Performed by: FAMILY MEDICINE

## 2019-09-30 PROCEDURE — 3074F PR MOST RECENT SYSTOLIC BLOOD PRESSURE < 130 MM HG: ICD-10-PCS | Mod: CPTII,S$GLB,, | Performed by: FAMILY MEDICINE

## 2019-09-30 PROCEDURE — 99214 OFFICE O/P EST MOD 30 MIN: CPT | Mod: S$GLB,,, | Performed by: FAMILY MEDICINE

## 2019-09-30 PROCEDURE — 3008F BODY MASS INDEX DOCD: CPT | Mod: CPTII,S$GLB,, | Performed by: FAMILY MEDICINE

## 2019-09-30 PROCEDURE — 3078F DIAST BP <80 MM HG: CPT | Mod: CPTII,S$GLB,, | Performed by: FAMILY MEDICINE

## 2019-09-30 PROCEDURE — 99999 PR PBB SHADOW E&M-EST. PATIENT-LVL III: CPT | Mod: PBBFAC,,, | Performed by: FAMILY MEDICINE

## 2019-09-30 RX ORDER — MOMETASONE FUROATE 1 MG/G
CREAM TOPICAL 2 TIMES DAILY
Qty: 45 G | Refills: 2 | Status: SHIPPED | OUTPATIENT
Start: 2019-09-30 | End: 2021-07-08

## 2019-09-30 NOTE — MEDICAL/APP STUDENT
Subjective:       Patient ID: Cuate Fair is a 64 y.o. male.    Chief Complaint: Rash    HPI     65 yo Mw/ HTN and HLD here for a rash     Started Monday on the R and anterior and posterior aspect of the forearms. Pt was cutting trees 3 weeks ago. Pt complains of itchness which he resists. Formed blisters first, Thursday or Friday started oozing. Tried cold ice which helps slightly. Similar event on the Left chin to calf area that resolved with oral antibiotics.     No fever, chills, or rigors. No other symptoms.     Review of Systems   Constitutional: Negative for activity change, appetite change, chills, diaphoresis, fatigue, fever and unexpected weight change.   HENT: Negative.    Respiratory: Negative.    Cardiovascular: Negative.    Gastrointestinal: Negative.    Genitourinary: Negative.    Musculoskeletal: Negative.        Objective:      Physical Exam   Constitutional: He appears well-developed and well-nourished.   HENT:   Right Ear: External ear normal.   Left Ear: External ear normal.   Nose: Nose normal.   Mouth/Throat: Oropharynx is clear and moist.   Eyes: Pupils are equal, round, and reactive to light.   Cardiovascular: Normal rate, regular rhythm, normal heart sounds and intact distal pulses. Exam reveals no gallop and no friction rub.   No murmur heard.  Pulmonary/Chest: Effort normal and breath sounds normal. No stridor. No respiratory distress. He has no wheezes. He has no rales. He exhibits no tenderness.   Abdominal: Soft. Bowel sounds are normal. He exhibits no distension and no mass. There is no tenderness. There is no rebound and no guarding.   Lymphadenopathy:     He has no cervical adenopathy.       Assessment:   65 yo Mw/ HTN and HLD here for a rash . Most likely contact dermatitis secondary to poison ivy or oak given recent miles work.   Plan:   Contact Dermatitis    -Steroids cream prescribed BID   -RTC if symptoms worsen    Dedrick Boone   MS4      Nutrition Services

## 2019-09-30 NOTE — PROGRESS NOTES
Subjective:       Patient ID: Cuate Fair is a 64 y.o. male.     Chief Complaint: Rash     HPI      65 yo Mw/ HTN and HLD here for a rash      Started Monday on the R and anterior and posterior aspect of the forearms. Pt was cutting trees 3 weeks ago. Pt complains of itchness which he resists. Formed blisters first, Thursday or Friday started oozing. Tried cold ice which helps slightly. Similar event on the Left chin to calf area that resolved with oral antibiotics.      No fever, chills, or rigors. No other symptoms.      Review of Systems   Constitutional: Negative for activity change, appetite change, chills, diaphoresis, fatigue, fever and unexpected weight change.   HENT: Negative.    Respiratory: Negative.    Cardiovascular: Negative.    Gastrointestinal: Negative.    Genitourinary: Negative.    Musculoskeletal: Negative.        Objective:   Physical Exam   Constitutional: He appears well-developed and well-nourished.   SKIN:  There are several discrete areas of weeping roughly round lesions in various stages of healing.  No vesicular component.  No secondary bacterial infection.  HENT:   Right Ear: External ear normal.   Left Ear: External ear normal.   Nose: Nose normal.   Mouth/Throat: Oropharynx is clear and moist.   Eyes: Pupils are equal, round, and reactive to light.   Cardiovascular: Normal rate, regular rhythm, normal heart sounds and intact distal pulses. Exam reveals no gallop and no friction rub.   No murmur heard.  Pulmonary/Chest: Effort normal and breath sounds normal. No stridor. No respiratory distress. He has no wheezes. He has no rales. He exhibits no tenderness.   Abdominal: Soft. Bowel sounds are normal. He exhibits no distension and no mass. There is no tenderness. There is no rebound and no guarding.   Lymphadenopathy:     He has no cervical adenopathy.       Assessment:   65 yo Mw/ HTN and HLD here for a rash . Most likely contact dermatitis secondary to poison ivy or oak  given recent miles work.   Plan:   Contact Dermatitis               -Steroids cream prescribed BID              -RTC if symptoms worsen  Hypertension, condition is well controlled on current medication regimen  Hyperlipidemia, condition is well controlled on current medication regimen

## 2019-10-22 DIAGNOSIS — E78.00 HYPERCHOLESTEROLEMIA: ICD-10-CM

## 2019-10-22 DIAGNOSIS — I10 HTN (HYPERTENSION), BENIGN: ICD-10-CM

## 2019-10-22 RX ORDER — AMLODIPINE BESYLATE 5 MG/1
TABLET ORAL
Qty: 90 TABLET | Refills: 0 | Status: SHIPPED | OUTPATIENT
Start: 2019-10-22 | End: 2020-01-08

## 2019-12-04 ENCOUNTER — LAB VISIT (OUTPATIENT)
Dept: LAB | Facility: OTHER | Age: 64
End: 2019-12-04
Attending: INTERNAL MEDICINE
Payer: COMMERCIAL

## 2019-12-04 DIAGNOSIS — E53.8 B12 DEFICIENCY: ICD-10-CM

## 2019-12-04 DIAGNOSIS — Z00.00 ANNUAL PHYSICAL EXAM: ICD-10-CM

## 2019-12-04 DIAGNOSIS — Z12.5 PROSTATE CANCER SCREENING: ICD-10-CM

## 2019-12-04 LAB
ALBUMIN SERPL BCP-MCNC: 4.1 G/DL (ref 3.5–5.2)
ALP SERPL-CCNC: 64 U/L (ref 55–135)
ALT SERPL W/O P-5'-P-CCNC: 23 U/L (ref 10–44)
ANION GAP SERPL CALC-SCNC: 10 MMOL/L (ref 8–16)
AST SERPL-CCNC: 21 U/L (ref 10–40)
BASOPHILS # BLD AUTO: 0.05 K/UL (ref 0–0.2)
BASOPHILS NFR BLD: 1.5 % (ref 0–1.9)
BILIRUB SERPL-MCNC: 0.8 MG/DL (ref 0.1–1)
BUN SERPL-MCNC: 17 MG/DL (ref 8–23)
CALCIUM SERPL-MCNC: 9.2 MG/DL (ref 8.7–10.5)
CHLORIDE SERPL-SCNC: 106 MMOL/L (ref 95–110)
CHOLEST SERPL-MCNC: 161 MG/DL (ref 120–199)
CHOLEST/HDLC SERPL: 3.4 {RATIO} (ref 2–5)
CO2 SERPL-SCNC: 25 MMOL/L (ref 23–29)
COMPLEXED PSA SERPL-MCNC: 1.2 NG/ML (ref 0–4)
CREAT SERPL-MCNC: 1.2 MG/DL (ref 0.5–1.4)
DIFFERENTIAL METHOD: ABNORMAL
EOSINOPHIL # BLD AUTO: 0.3 K/UL (ref 0–0.5)
EOSINOPHIL NFR BLD: 9.7 % (ref 0–8)
ERYTHROCYTE [DISTWIDTH] IN BLOOD BY AUTOMATED COUNT: 12.7 % (ref 11.5–14.5)
EST. GFR  (AFRICAN AMERICAN): >60 ML/MIN/1.73 M^2
EST. GFR  (NON AFRICAN AMERICAN): >60 ML/MIN/1.73 M^2
GLUCOSE SERPL-MCNC: 100 MG/DL (ref 70–110)
HCT VFR BLD AUTO: 48.8 % (ref 40–54)
HDLC SERPL-MCNC: 47 MG/DL (ref 40–75)
HDLC SERPL: 29.2 % (ref 20–50)
HGB BLD-MCNC: 15.9 G/DL (ref 14–18)
IMM GRANULOCYTES # BLD AUTO: 0.01 K/UL (ref 0–0.04)
IMM GRANULOCYTES NFR BLD AUTO: 0.3 % (ref 0–0.5)
LDLC SERPL CALC-MCNC: 102.4 MG/DL (ref 63–159)
LYMPHOCYTES # BLD AUTO: 1.3 K/UL (ref 1–4.8)
LYMPHOCYTES NFR BLD: 38.3 % (ref 18–48)
MCH RBC QN AUTO: 29 PG (ref 27–31)
MCHC RBC AUTO-ENTMCNC: 32.6 G/DL (ref 32–36)
MCV RBC AUTO: 89 FL (ref 82–98)
MONOCYTES # BLD AUTO: 0.4 K/UL (ref 0.3–1)
MONOCYTES NFR BLD: 12.1 % (ref 4–15)
NEUTROPHILS # BLD AUTO: 1.3 K/UL (ref 1.8–7.7)
NEUTROPHILS NFR BLD: 38.1 % (ref 38–73)
NONHDLC SERPL-MCNC: 114 MG/DL
NRBC BLD-RTO: 0 /100 WBC
PLATELET # BLD AUTO: 148 K/UL (ref 150–350)
PMV BLD AUTO: 11.3 FL (ref 9.2–12.9)
POTASSIUM SERPL-SCNC: 4 MMOL/L (ref 3.5–5.1)
PROT SERPL-MCNC: 7.1 G/DL (ref 6–8.4)
RBC # BLD AUTO: 5.49 M/UL (ref 4.6–6.2)
SODIUM SERPL-SCNC: 141 MMOL/L (ref 136–145)
TRIGL SERPL-MCNC: 58 MG/DL (ref 30–150)
VIT B12 SERPL-MCNC: 984 PG/ML (ref 210–950)
WBC # BLD AUTO: 3.39 K/UL (ref 3.9–12.7)

## 2019-12-04 PROCEDURE — 80061 LIPID PANEL: CPT

## 2019-12-04 PROCEDURE — 84153 ASSAY OF PSA TOTAL: CPT

## 2019-12-04 PROCEDURE — 82607 VITAMIN B-12: CPT

## 2019-12-04 PROCEDURE — 80053 COMPREHEN METABOLIC PANEL: CPT

## 2019-12-04 PROCEDURE — 85025 COMPLETE CBC W/AUTO DIFF WBC: CPT

## 2019-12-04 PROCEDURE — 36415 COLL VENOUS BLD VENIPUNCTURE: CPT

## 2019-12-05 ENCOUNTER — TELEPHONE (OUTPATIENT)
Dept: INTERNAL MEDICINE | Facility: CLINIC | Age: 64
End: 2019-12-05

## 2019-12-05 DIAGNOSIS — D69.6 THROMBOCYTOPENIA: Primary | ICD-10-CM

## 2019-12-05 DIAGNOSIS — D72.819 LEUKOPENIA, UNSPECIFIED TYPE: ICD-10-CM

## 2019-12-05 NOTE — TELEPHONE ENCOUNTER
Please notify pt that labs are stable and in good range overall except that his white blood cell count and platelet count have mildly decreased.   - recommend continue current medications  - I am referring him to a hematologist to evaluate this further - please arrange

## 2019-12-06 NOTE — TELEPHONE ENCOUNTER
Called pt twice and LVM stating to return office call regarding lab results and update sot plan   Left office number for pt to return call

## 2019-12-08 ENCOUNTER — PATIENT OUTREACH (OUTPATIENT)
Dept: ADMINISTRATIVE | Facility: OTHER | Age: 64
End: 2019-12-08

## 2019-12-09 NOTE — TELEPHONE ENCOUNTER
Spoke with patient. Discussed results. Patient would like to schedule with Hem/Onc. Unable to schedule appt. Sent message to staff inbasket. Advised patient to call them if he has not heard back by tomorrow afternoon. Gave phone number.

## 2019-12-09 NOTE — TELEPHONE ENCOUNTER
Received message that pt received message to schedule with hematology and he would like call back from staff about indication for this.     Please try to call pt again to inform him of lab results below and indication for hematology referral - thanks!                  []Hide copied text    []Hover for details  Please notify pt that labs are stable and in good range overall except that his white blood cell count and platelet count have mildly decreased.   - recommend continue current medications  - I am referring him to a hematologist to evaluate this further - please arrange

## 2019-12-10 ENCOUNTER — TELEPHONE (OUTPATIENT)
Dept: HEMATOLOGY/ONCOLOGY | Facility: CLINIC | Age: 64
End: 2019-12-10

## 2019-12-12 ENCOUNTER — OFFICE VISIT (OUTPATIENT)
Dept: OTOLARYNGOLOGY | Facility: CLINIC | Age: 64
End: 2019-12-12
Payer: COMMERCIAL

## 2019-12-12 VITALS
BODY MASS INDEX: 25.53 KG/M2 | DIASTOLIC BLOOD PRESSURE: 81 MMHG | SYSTOLIC BLOOD PRESSURE: 129 MMHG | WEIGHT: 168.44 LBS | HEART RATE: 62 BPM | HEIGHT: 68 IN

## 2019-12-12 DIAGNOSIS — H61.22 IMPACTED CERUMEN OF LEFT EAR: ICD-10-CM

## 2019-12-12 DIAGNOSIS — R19.6 HALITOSIS: ICD-10-CM

## 2019-12-12 DIAGNOSIS — K21.9 GASTROESOPHAGEAL REFLUX DISEASE, ESOPHAGITIS PRESENCE NOT SPECIFIED: ICD-10-CM

## 2019-12-12 DIAGNOSIS — Z77.122 HISTORY OF EXPOSURE TO NOISE: Primary | ICD-10-CM

## 2019-12-12 PROCEDURE — 3074F PR MOST RECENT SYSTOLIC BLOOD PRESSURE < 130 MM HG: ICD-10-PCS | Mod: CPTII,S$GLB,, | Performed by: OTOLARYNGOLOGY

## 2019-12-12 PROCEDURE — 3079F PR MOST RECENT DIASTOLIC BLOOD PRESSURE 80-89 MM HG: ICD-10-PCS | Mod: CPTII,S$GLB,, | Performed by: OTOLARYNGOLOGY

## 2019-12-12 PROCEDURE — 99213 OFFICE O/P EST LOW 20 MIN: CPT | Mod: 25,S$GLB,, | Performed by: OTOLARYNGOLOGY

## 2019-12-12 PROCEDURE — 3079F DIAST BP 80-89 MM HG: CPT | Mod: CPTII,S$GLB,, | Performed by: OTOLARYNGOLOGY

## 2019-12-12 PROCEDURE — 3008F PR BODY MASS INDEX (BMI) DOCUMENTED: ICD-10-PCS | Mod: CPTII,S$GLB,, | Performed by: OTOLARYNGOLOGY

## 2019-12-12 PROCEDURE — 99999 PR PBB SHADOW E&M-EST. PATIENT-LVL IV: CPT | Mod: PBBFAC,,, | Performed by: OTOLARYNGOLOGY

## 2019-12-12 PROCEDURE — 3074F SYST BP LT 130 MM HG: CPT | Mod: CPTII,S$GLB,, | Performed by: OTOLARYNGOLOGY

## 2019-12-12 PROCEDURE — 3008F BODY MASS INDEX DOCD: CPT | Mod: CPTII,S$GLB,, | Performed by: OTOLARYNGOLOGY

## 2019-12-12 PROCEDURE — 99999 PR PBB SHADOW E&M-EST. PATIENT-LVL IV: ICD-10-PCS | Mod: PBBFAC,,, | Performed by: OTOLARYNGOLOGY

## 2019-12-12 PROCEDURE — 99213 PR OFFICE/OUTPT VISIT, EST, LEVL III, 20-29 MIN: ICD-10-PCS | Mod: 25,S$GLB,, | Performed by: OTOLARYNGOLOGY

## 2019-12-12 PROCEDURE — 69210 REMOVE IMPACTED EAR WAX UNI: CPT | Mod: S$GLB,,, | Performed by: OTOLARYNGOLOGY

## 2019-12-12 PROCEDURE — 69210 PR REMOVAL IMPACTED CERUMEN REQUIRING INSTRUMENTATION, UNILATERAL: ICD-10-PCS | Mod: S$GLB,,, | Performed by: OTOLARYNGOLOGY

## 2019-12-12 NOTE — PROGRESS NOTES
CC: AS cleaning +   HPI:Mr. Fair is a 64-year-old  male presents today for a left ear cleaning procedure.  He was advised by his PCP to have his left ear cleaned due to a cerumen impaction problem noted there.  He indicates a history of noise exposure forming work with [employer] for [employer].  He, parenthetically, Nazia any indicates a problem with halitosis; he notices people recall in from him at times.  His son has indicated his problem with halitosis, as well.    He admits to GERD symptoms; anything I eat causes acid reflux .      Past Medical History:   Diagnosis Date    Colon polyp     Hypertension     Allergies:  Codeine  Past Surgical History:   Procedure Laterality Date    COLONOSCOPY      TONSILLECTOMY         Current Outpatient Medications on File Prior to Visit   Medication Sig Dispense Refill    amLODIPine (NORVASC) 5 MG tablet Take 1 tablet (5 mg total) by mouth once daily. 90 tablet 3    amLODIPine (NORVASC) 5 MG tablet TAKE 1 TABLET BY MOUTH EVERY DAY 90 tablet 0    atorvastatin (LIPITOR) 40 MG tablet Take 1 tablet (40 mg total) by mouth once daily. 90 tablet 3    cyanocobalamin (VITAMIN B-12) 1000 MCG tablet Take 1 tablet (1,000 mcg total) by mouth once daily.      ketoconazole (NIZORAL) 2 % cream Apply topically once daily. 15 g 0    losartan (COZAAR) 50 MG tablet Take 1 tablet (50 mg total) by mouth once daily. 90 tablet 3    mometasone 0.1% (ELOCON) 0.1 % cream Apply topically 2 (two) times daily. 45 g 2     No current facility-administered medications on file prior to visit.          PE:  Blood pressure 129/81 pulse 62 height 5 ft 8 in weight 168  General:  Alert and oriented gentleman in no acute distress  Both ears were examined under the microscope in the micro procedure room.  Procedure:  A cerumen impactions removed from the non occluded left ear canal with a blunt curette.  Both eardrums are intact and clear as visualized  directly.  Nasal exam is unremarkable for nasal polyposis or purulent discharge of either passage.  Oropharyngeal exam reveals no evidence of thrush, oral inflammation, ulceration or pharyngitis.      DIAGNOSIS:     ICD-10-CM ICD-9-CM    1. History of exposure to noise Z77.122 V15.89    2. Impacted cerumen of left ear H61.22 380.4    3. Halitosis R19.6 784.99    4. Gastroesophageal reflux disease, esophagitis presence not specified K21.9 530.81      PLAN: C.I. extracted from AS eac  Audiometry offered/defrred  Halitosis literature provided; consider vitamin change + dental visit pending course  Anti GERD literatuer provided; consider GI consultation pending course

## 2019-12-12 NOTE — PATIENT INSTRUCTIONS
C.I. extracted from AS eac  Audiometry offered/defrred  Halitosis literature provided; consider vitamin change + dental visit pending course  Anti GERD literatuer provided; consider GI consultation pending course

## 2019-12-19 ENCOUNTER — LAB VISIT (OUTPATIENT)
Dept: LAB | Facility: HOSPITAL | Age: 64
End: 2019-12-19
Payer: COMMERCIAL

## 2019-12-19 ENCOUNTER — INITIAL CONSULT (OUTPATIENT)
Dept: HEMATOLOGY/ONCOLOGY | Facility: CLINIC | Age: 64
End: 2019-12-19
Payer: COMMERCIAL

## 2019-12-19 VITALS
BODY MASS INDEX: 26.66 KG/M2 | DIASTOLIC BLOOD PRESSURE: 76 MMHG | HEART RATE: 63 BPM | TEMPERATURE: 98 F | RESPIRATION RATE: 16 BRPM | OXYGEN SATURATION: 98 % | WEIGHT: 175.94 LBS | HEIGHT: 68 IN | SYSTOLIC BLOOD PRESSURE: 123 MMHG

## 2019-12-19 DIAGNOSIS — R53.82 CHRONIC FATIGUE: ICD-10-CM

## 2019-12-19 DIAGNOSIS — D70.8 OTHER NEUTROPENIA: ICD-10-CM

## 2019-12-19 DIAGNOSIS — D69.6 THROMBOCYTOPENIA: ICD-10-CM

## 2019-12-19 DIAGNOSIS — I10 ESSENTIAL HYPERTENSION: Primary | ICD-10-CM

## 2019-12-19 DIAGNOSIS — E78.00 HYPERCHOLESTEROLEMIA: ICD-10-CM

## 2019-12-19 LAB
BASOPHILS # BLD AUTO: 0.03 K/UL (ref 0–0.2)
BASOPHILS NFR BLD: 0.8 % (ref 0–1.9)
DIFFERENTIAL METHOD: ABNORMAL
EOSINOPHIL # BLD AUTO: 0.2 K/UL (ref 0–0.5)
EOSINOPHIL NFR BLD: 5.8 % (ref 0–8)
ERYTHROCYTE [DISTWIDTH] IN BLOOD BY AUTOMATED COUNT: 13 % (ref 11.5–14.5)
HCT VFR BLD AUTO: 49.7 % (ref 40–54)
HGB BLD-MCNC: 16.1 G/DL (ref 14–18)
IGA SERPL-MCNC: 93 MG/DL (ref 40–350)
IGG SERPL-MCNC: 1264 MG/DL (ref 650–1600)
IGM SERPL-MCNC: 56 MG/DL (ref 50–300)
IMM GRANULOCYTES # BLD AUTO: 0.01 K/UL (ref 0–0.04)
IMM GRANULOCYTES NFR BLD AUTO: 0.3 % (ref 0–0.5)
LDH SERPL L TO P-CCNC: 239 U/L (ref 110–260)
LYMPHOCYTES # BLD AUTO: 1.5 K/UL (ref 1–4.8)
LYMPHOCYTES NFR BLD: 38 % (ref 18–48)
MCH RBC QN AUTO: 29.5 PG (ref 27–31)
MCHC RBC AUTO-ENTMCNC: 32.4 G/DL (ref 32–36)
MCV RBC AUTO: 91 FL (ref 82–98)
MONOCYTES # BLD AUTO: 0.5 K/UL (ref 0.3–1)
MONOCYTES NFR BLD: 12.8 % (ref 4–15)
NEUTROPHILS # BLD AUTO: 1.7 K/UL (ref 1.8–7.7)
NEUTROPHILS NFR BLD: 42.3 % (ref 38–73)
NRBC BLD-RTO: 0 /100 WBC
PLATELET # BLD AUTO: 152 K/UL (ref 150–350)
PMV BLD AUTO: 11 FL (ref 9.2–12.9)
RBC # BLD AUTO: 5.46 M/UL (ref 4.6–6.2)
T4 FREE SERPL-MCNC: 0.78 NG/DL (ref 0.71–1.51)
TSH SERPL DL<=0.005 MIU/L-ACNC: 0.7 UIU/ML (ref 0.4–4)
WBC # BLD AUTO: 4 K/UL (ref 3.9–12.7)

## 2019-12-19 PROCEDURE — 84443 ASSAY THYROID STIM HORMONE: CPT

## 2019-12-19 PROCEDURE — 99205 PR OFFICE/OUTPT VISIT, NEW, LEVL V, 60-74 MIN: ICD-10-PCS | Mod: S$GLB,,, | Performed by: INTERNAL MEDICINE

## 2019-12-19 PROCEDURE — 3074F PR MOST RECENT SYSTOLIC BLOOD PRESSURE < 130 MM HG: ICD-10-PCS | Mod: CPTII,S$GLB,, | Performed by: INTERNAL MEDICINE

## 2019-12-19 PROCEDURE — 82784 ASSAY IGA/IGD/IGG/IGM EACH: CPT | Mod: 59

## 2019-12-19 PROCEDURE — 83615 LACTATE (LD) (LDH) ENZYME: CPT

## 2019-12-19 PROCEDURE — 86038 ANTINUCLEAR ANTIBODIES: CPT

## 2019-12-19 PROCEDURE — 86235 NUCLEAR ANTIGEN ANTIBODY: CPT | Mod: 59

## 2019-12-19 PROCEDURE — 36415 COLL VENOUS BLD VENIPUNCTURE: CPT

## 2019-12-19 PROCEDURE — 84165 PATHOLOGIST INTERPRETATION SPE: ICD-10-PCS | Mod: 26,,, | Performed by: PATHOLOGY

## 2019-12-19 PROCEDURE — 99999 PR PBB SHADOW E&M-EST. PATIENT-LVL III: CPT | Mod: PBBFAC,,, | Performed by: INTERNAL MEDICINE

## 2019-12-19 PROCEDURE — 99999 PR PBB SHADOW E&M-EST. PATIENT-LVL III: ICD-10-PCS | Mod: PBBFAC,,, | Performed by: INTERNAL MEDICINE

## 2019-12-19 PROCEDURE — 3074F SYST BP LT 130 MM HG: CPT | Mod: CPTII,S$GLB,, | Performed by: INTERNAL MEDICINE

## 2019-12-19 PROCEDURE — 84165 PROTEIN E-PHORESIS SERUM: CPT

## 2019-12-19 PROCEDURE — 86334 PATHOLOGIST INTERPRETATION IFE: ICD-10-PCS | Mod: 26,,, | Performed by: PATHOLOGY

## 2019-12-19 PROCEDURE — 85025 COMPLETE CBC W/AUTO DIFF WBC: CPT

## 2019-12-19 PROCEDURE — 3008F PR BODY MASS INDEX (BMI) DOCUMENTED: ICD-10-PCS | Mod: CPTII,S$GLB,, | Performed by: INTERNAL MEDICINE

## 2019-12-19 PROCEDURE — 84165 PROTEIN E-PHORESIS SERUM: CPT | Mod: 26,,, | Performed by: PATHOLOGY

## 2019-12-19 PROCEDURE — 3078F PR MOST RECENT DIASTOLIC BLOOD PRESSURE < 80 MM HG: ICD-10-PCS | Mod: CPTII,S$GLB,, | Performed by: INTERNAL MEDICINE

## 2019-12-19 PROCEDURE — 84439 ASSAY OF FREE THYROXINE: CPT

## 2019-12-19 PROCEDURE — 86334 IMMUNOFIX E-PHORESIS SERUM: CPT | Mod: 26,,, | Performed by: PATHOLOGY

## 2019-12-19 PROCEDURE — 86039 ANTINUCLEAR ANTIBODIES (ANA): CPT

## 2019-12-19 PROCEDURE — 3008F BODY MASS INDEX DOCD: CPT | Mod: CPTII,S$GLB,, | Performed by: INTERNAL MEDICINE

## 2019-12-19 PROCEDURE — 86334 IMMUNOFIX E-PHORESIS SERUM: CPT

## 2019-12-19 PROCEDURE — 3078F DIAST BP <80 MM HG: CPT | Mod: CPTII,S$GLB,, | Performed by: INTERNAL MEDICINE

## 2019-12-19 PROCEDURE — 99205 OFFICE O/P NEW HI 60 MIN: CPT | Mod: S$GLB,,, | Performed by: INTERNAL MEDICINE

## 2019-12-19 NOTE — PROGRESS NOTES
CC: Low platelets, initial visit      HPI: , 64, is here for hematology consultation for low platelets. He has anxiety, HTn, dyslipidemia. He is not sure when he was first told he had low platelets. He denies nausea, emesis, GI bleeding / bleeding/nose bleeds/hematuria/hemoptysis or easy bruising.Review of records show that his platelets have been slightly below lower limit of normal since 2013, but only intermittently. Most recent platelets on 12?4/19 was [id number].  He denies drinking alcohol, or using recreational drugs. He has not started any new medications recently except for steroid ream for his leg.    No fevers, weight loss, change in appetite, night sweats or arthralgias.   No recent travel outside [place]. No known family h/o low platelets.       Review of Systems   Constitutional: Positive for malaise/fatigue. Negative for chills, fever and weight loss.   HENT: Negative for hearing loss and tinnitus.    Eyes: Negative.    Respiratory: Negative.  Negative for stridor.    Cardiovascular: Negative.    Gastrointestinal: Negative for abdominal pain, blood in stool, constipation, diarrhea and melena.   Genitourinary: Negative for dysuria, frequency, hematuria and urgency.   Musculoskeletal: Negative for back pain, joint pain and myalgias.   Neurological: Negative for tingling, tremors and headaches.   Endo/Heme/Allergies: Negative for environmental allergies. Does not bruise/bleed easily.   Psychiatric/Behavioral: Negative for depression, substance abuse and suicidal ideas.         Past Medical History:   Diagnosis Date    Colon polyp     Hypertension          Past Surgical History:   Procedure Laterality Date    COLONOSCOPY      TONSILLECTOMY           Social History     Socioeconomic History    Marital status:      Spouse name: Not on file    Number of children: Not on file    Years of education: Not on file    Highest education level: Not on file   Occupational History    Occupation:  [employer]    Social Needs    Financial resource strain: Not on file    Food insecurity:     Worry: Not on file     Inability: Not on file    Transportation needs:     Medical: Not on file     Non-medical: Not on file   Tobacco Use    Smoking status: Never Smoker    Smokeless tobacco: Never Used   Substance and Sexual Activity    Alcohol use: No    Drug use: No    Sexual activity: Yes     Partners: Male         Current Outpatient Medications   Medication Sig    amLODIPine (NORVASC) 5 MG tablet Take 1 tablet (5 mg total) by mouth once daily.    amLODIPine (NORVASC) 5 MG tablet TAKE 1 TABLET BY MOUTH EVERY DAY    atorvastatin (LIPITOR) 40 MG tablet Take 1 tablet (40 mg total) by mouth once daily.    cyanocobalamin (VITAMIN B-12) 1000 MCG tablet Take 1 tablet (1,000 mcg total) by mouth once daily.    ketoconazole (NIZORAL) 2 % cream Apply topically once daily.    losartan (COZAAR) 50 MG tablet Take 1 tablet (50 mg total) by mouth once daily.    mometasone 0.1% (ELOCON) 0.1 % cream Apply topically 2 (two) times daily.     No current facility-administered medications for this visit.            Review of patient's allergies indicates:   Allergen Reactions    Codeine        Vitals:    12/19/19 0915   BP: 123/76   Pulse: 63   Resp: 16   Temp: 98 °F (36.7 °C)         Physical Exam   Constitutional: He appears well-developed.   HENT:   Head: Atraumatic.   Mouth/Throat: No oropharyngeal exudate.   Eyes: No scleral icterus.   Cardiovascular: Normal rate and regular rhythm.   No murmur heard.  Pulmonary/Chest: Effort normal and breath sounds normal. No respiratory distress. He has no wheezes. He has no rales.   Abdominal: Soft. He exhibits no distension. There is no tenderness. There is no rebound.   Musculoskeletal: He exhibits no edema.   Neurological: He is alert.   Skin: Skin is warm.   He has lichenification of skin of both his calves, with some erythema   Psychiatric: He has a normal mood and  affect.     Component      Latest Ref Rng & Units 12/4/2019   WBC      3.90 - 12.70 K/uL 3.39 (L)   RBC      4.60 - 6.20 M/uL 5.49   Hemoglobin      14.0 - 18.0 g/dL 15.9   Hematocrit      40.0 - 54.0 % 48.8   MCV      82 - 98 fL 89   MCH      27.0 - 31.0 pg 29.0   MCHC      32.0 - 36.0 g/dL 32.6   RDW      11.5 - 14.5 % 12.7   Platelets      150 - 350 K/uL 148 (L)   MPV      9.2 - 12.9 fL 11.3   Immature Granulocytes      0.0 - 0.5 % 0.3   Gran # (ANC)      1.8 - 7.7 K/uL 1.3 (L)   Immature Grans (Abs)      0.00 - 0.04 K/uL 0.01   Lymph #      1.0 - 4.8 K/uL 1.3   Mono #      0.3 - 1.0 K/uL 0.4   Eos #      0.0 - 0.5 K/uL 0.3   Baso #      0.00 - 0.20 K/uL 0.05   nRBC      0 /100 WBC 0   Gran%      38.0 - 73.0 % 38.1   Lymph%      18.0 - 48.0 % 38.3   Mono%      4.0 - 15.0 % 12.1   Eosinophil%      0.0 - 8.0 % 9.7 (H)   Basophil%      0.0 - 1.9 % 1.5   Differential Method       Automated   Sodium      136 - 145 mmol/L 141   Potassium      3.5 - 5.1 mmol/L 4.0   Chloride      95 - 110 mmol/L 106   CO2      23 - 29 mmol/L 25   Glucose      70 - 110 mg/dL 100   BUN, Bld      8 - 23 mg/dL 17   Creatinine      0.5 - 1.4 mg/dL 1.2   Calcium      8.7 - 10.5 mg/dL 9.2   PROTEIN TOTAL      6.0 - 8.4 g/dL 7.1   Albumin      3.5 - 5.2 g/dL 4.1   BILIRUBIN TOTAL      0.1 - 1.0 mg/dL 0.8   Alkaline Phosphatase      55 - 135 U/L 64   AST      10 - 40 U/L 21   ALT      10 - 44 U/L 23   Anion Gap      8 - 16 mmol/L 10   eGFR if African American      >60 mL/min/1.73 m:2 >60   eGFR if non African American      >60 mL/min/1.73 m:2 >60   Vitamin B-12      210 - 950 pg/mL 984 (H)       Component      Latest Ref Rng & Units 3/13/2019 12/14/2018   HIV 1/2 Ag/Ab      Negative  Negative   Hepatitis B Surface Ag        Negative   Hep B Core Total Ab        Positive (A)   Hep B S Ab        Positive (A)   HBe Ag      Negative NEG      Component      Latest Ref Rng & Units 6/12/2018   Hepatitis C Ab       Negative     Assessment:    1.  Thrombocytopenia, chronic  2. Leukopenia  3. Neutropenia  4. Fatigue, chronic    Plan:    1. He is asymptomatic. He denies drinking alcohol, or using recreational drugs. He has not started any new medications recently except for steroid ream for his leg.  No fevers, weight loss, change in appetite, night sweats or arthralgias.   No recent travel outside [place]. No known family h/o low [id number]  Is normal. Folate normal in 2018. He will have MARY, SPEP, serum immunofixation, serum quantitative immunoglobulin checked. HCV Ab, HIV An negative in 2018. HBs Ag wa snegative, but Hep B core antibody was positive. HBe Ag was negative. No palpable spleen or liver on examination today. No indication for bone marrow biopsy.     2,3: Mild, asymptomatic. Possibly constitutional/ ethnic neutropenia.     4. He will have TFT checked.

## 2019-12-19 NOTE — LETTER
December 19, 2019      Carolyne Beth MD  [address]  [facility] [address]           [facility]  [address]  [facility] [address]  Phone: [phone]          Patient: Cuate Fair   MR Number: [id number]   YOB: 1955   Date of Visit: 12/19/2019       Dear Dr. Carolyne Beth:    Thank you for referring Cuate Fair to me for evaluation. Attached you will find relevant portions of my assessment and plan of care.    If you have questions, please do not hesitate to call me. I look forward to following Cuate Fair along with you.    Sincerely,    Marleny Garcia MD    Enclosure  CC:  No Recipients    If you would like to receive this communication electronically, please contact [email or web address] or [phone] to request more information on [facility] access.    For providers and/or their staff who would like to refer a patient to [facility], please contact us through our one-stop-shop provider referral line, [facility], at [phone].    If you feel you have received this communication in error or would no longer like to receive these types of communications, please e-mail [email or web address]

## 2019-12-20 LAB
ALBUMIN SERPL ELPH-MCNC: 4.24 G/DL (ref 3.35–5.55)
ALPHA1 GLOB SERPL ELPH-MCNC: 0.26 G/DL (ref 0.17–0.41)
ALPHA2 GLOB SERPL ELPH-MCNC: 0.66 G/DL (ref 0.43–0.99)
ANA SER QL IF: POSITIVE
ANA TITR SER IF: NORMAL {TITER}
B-GLOBULIN SERPL ELPH-MCNC: 0.69 G/DL (ref 0.5–1.1)
GAMMA GLOB SERPL ELPH-MCNC: 1.15 G/DL (ref 0.67–1.58)
INTERPRETATION SERPL IFE-IMP: NORMAL
PATHOLOGIST INTERPRETATION IFE: NORMAL
PATHOLOGIST INTERPRETATION SPE: NORMAL
PROT SERPL-MCNC: 7 G/DL (ref 6–8.4)

## 2019-12-24 LAB
ANTI SM ANTIBODY: 1.93 EU (ref 0–19.99)
ANTI SM/RNP ANTIBODY: 0 EU (ref 0–19.99)
ANTI-SM INTERPRETATION: NEGATIVE
ANTI-SM/RNP INTERPRETATION: NEGATIVE
ANTI-SSA ANTIBODY: 1.24 EU (ref 0–19.99)
ANTI-SSA INTERPRETATION: NEGATIVE
ANTI-SSB ANTIBODY: 0.42 EU (ref 0–19.99)
ANTI-SSB INTERPRETATION: NEGATIVE
DSDNA AB SER-ACNC: NORMAL [IU]/ML

## 2020-01-08 ENCOUNTER — OFFICE VISIT (OUTPATIENT)
Dept: INTERNAL MEDICINE | Facility: CLINIC | Age: 65
End: 2020-01-08
Attending: INTERNAL MEDICINE
Payer: COMMERCIAL

## 2020-01-08 VITALS
DIASTOLIC BLOOD PRESSURE: 80 MMHG | HEIGHT: 68 IN | WEIGHT: 171.06 LBS | OXYGEN SATURATION: 97 % | BODY MASS INDEX: 25.92 KG/M2 | HEART RATE: 75 BPM | SYSTOLIC BLOOD PRESSURE: 130 MMHG

## 2020-01-08 DIAGNOSIS — E78.00 HYPERCHOLESTEROLEMIA: ICD-10-CM

## 2020-01-08 DIAGNOSIS — M79.89 SOFT TISSUE MASS: Primary | ICD-10-CM

## 2020-01-08 DIAGNOSIS — E78.5 HYPERLIPIDEMIA, UNSPECIFIED HYPERLIPIDEMIA TYPE: ICD-10-CM

## 2020-01-08 DIAGNOSIS — I10 HTN (HYPERTENSION), BENIGN: ICD-10-CM

## 2020-01-08 DIAGNOSIS — I10 ESSENTIAL HYPERTENSION: ICD-10-CM

## 2020-01-08 PROCEDURE — 3075F SYST BP GE 130 - 139MM HG: CPT | Mod: CPTII,S$GLB,, | Performed by: INTERNAL MEDICINE

## 2020-01-08 PROCEDURE — 99214 OFFICE O/P EST MOD 30 MIN: CPT | Mod: S$GLB,,, | Performed by: INTERNAL MEDICINE

## 2020-01-08 PROCEDURE — 3075F PR MOST RECENT SYSTOLIC BLOOD PRESS GE 130-139MM HG: ICD-10-PCS | Mod: CPTII,S$GLB,, | Performed by: INTERNAL MEDICINE

## 2020-01-08 PROCEDURE — 99999 PR PBB SHADOW E&M-EST. PATIENT-LVL IV: ICD-10-PCS | Mod: PBBFAC,,, | Performed by: INTERNAL MEDICINE

## 2020-01-08 PROCEDURE — 3008F PR BODY MASS INDEX (BMI) DOCUMENTED: ICD-10-PCS | Mod: CPTII,S$GLB,, | Performed by: INTERNAL MEDICINE

## 2020-01-08 PROCEDURE — 3008F BODY MASS INDEX DOCD: CPT | Mod: CPTII,S$GLB,, | Performed by: INTERNAL MEDICINE

## 2020-01-08 PROCEDURE — 99999 PR PBB SHADOW E&M-EST. PATIENT-LVL IV: CPT | Mod: PBBFAC,,, | Performed by: INTERNAL MEDICINE

## 2020-01-08 PROCEDURE — 3079F PR MOST RECENT DIASTOLIC BLOOD PRESSURE 80-89 MM HG: ICD-10-PCS | Mod: CPTII,S$GLB,, | Performed by: INTERNAL MEDICINE

## 2020-01-08 PROCEDURE — 99214 PR OFFICE/OUTPT VISIT, EST, LEVL IV, 30-39 MIN: ICD-10-PCS | Mod: S$GLB,,, | Performed by: INTERNAL MEDICINE

## 2020-01-08 PROCEDURE — 3079F DIAST BP 80-89 MM HG: CPT | Mod: CPTII,S$GLB,, | Performed by: INTERNAL MEDICINE

## 2020-01-08 RX ORDER — AMLODIPINE BESYLATE 5 MG/1
5 TABLET ORAL DAILY
Qty: 90 TABLET | Refills: 3 | Status: SHIPPED | OUTPATIENT
Start: 2020-01-08 | End: 2020-12-09 | Stop reason: SDUPTHER

## 2020-01-08 RX ORDER — SILDENAFIL 50 MG/1
50 TABLET, FILM COATED ORAL DAILY PRN
Qty: 30 TABLET | Refills: 0 | Status: SHIPPED | OUTPATIENT
Start: 2020-01-08 | End: 2020-10-26 | Stop reason: SDUPTHER

## 2020-01-08 RX ORDER — ATORVASTATIN CALCIUM 40 MG/1
40 TABLET, FILM COATED ORAL DAILY
Qty: 90 TABLET | Refills: 3 | Status: SHIPPED | OUTPATIENT
Start: 2020-01-08 | End: 2020-06-23 | Stop reason: SDUPTHER

## 2020-01-08 RX ORDER — CEPHALEXIN 500 MG/1
500 CAPSULE ORAL EVERY 6 HOURS
Qty: 40 CAPSULE | Refills: 0 | Status: SHIPPED | OUTPATIENT
Start: 2020-01-08 | End: 2020-01-18

## 2020-01-08 RX ORDER — LOSARTAN POTASSIUM 50 MG/1
50 TABLET ORAL DAILY
Qty: 90 TABLET | Refills: 3 | Status: SHIPPED | OUTPATIENT
Start: 2020-01-08 | End: 2020-12-09 | Stop reason: SDUPTHER

## 2020-01-08 NOTE — PROGRESS NOTES
Subjective:   Patient ID: Cuate Fair is a 64 y.o. male  Chief complaint:   Chief Complaint   Patient presents with    Hypertension       HPI    HTN:   Reports checking intermittently   Reports home readings 120-130/ 60-70  - declined dig htn program in past  - taking bp meds in am - amlodipine 5mg and losartan 50mg     HLD: taking statin nightly - tolerating well      B12: taking b12 daily - level improved      Had eval for low platelets and wbc - rai +   No joint pain, inc warmth or redness of joints   No Hair loss  No Dry eyes  +dry mouth  No Photosensitivity  No Raynaud's  No Oral or nasal ulcers  No Rashes  No Pleurisy or pericarditis.  No Seizures, psychosis, or stroke.  No Venous or arterial clots.    Previously:   Seen by NP for mass on leg 8/22  Reports area on left medial calf was enlarged - never took abx for this   - US neg for clot  - sx started at beginnign of august with more activity/ long periods of standing  - area at left medial calf was red, tender, swollen at initial appt 8/22 and this has gradually improved on own since then   - first episode  - no fevers or chills   - no trauma or inciting event that he recalls   - no new detergents or skin products   Area has improved - centrally no redness, or edema   Still with palpable firm areas at distal and prox end   - nontender to touch   - no calf ttp     - this was treated with abx and sx resolved and then returned and has resolved and returned   Typically occurs at left medial calf   - his reports + trauma ta times bu tunsure if each episode is preceded by trauma   - area is tender and mildly warm and red when this occurs     Did not complete US or see derm as ordered at [facility]    Today reports area at left medial calf - more proximal than previous - is present - no changes since first noticed a few days ago  Also noticed area of tenderness, firm at proximal right distal ankle  - no fevers or calf swelling       Review of  "Systems    Objective:  Vitals:    01/08/20 1433   BP: 130/80   Pulse: 75   SpO2: 97%   Weight: 77.6 kg (171 lb 1.2 oz)   Height: 5' 8" (1.727 m)     Body mass index is 26.01 kg/m².    Physical Exam   Constitutional: He is oriented to person, place, and time. He appears well-developed and well-nourished.   HENT:   Head: Normocephalic and atraumatic.   Eyes: Conjunctivae and EOM are normal.   Neck: Neck supple.   Cardiovascular: Normal rate, regular rhythm and intact distal pulses.   Pulmonary/Chest: Effort normal and breath sounds normal.   Abdominal: Soft. Bowel sounds are normal.   Musculoskeletal: He exhibits tenderness. He exhibits no edema.   1.5x1.5" round red tender area at left medial calf  2x2' round nontender round mass at proximal right medial calf    Lymphadenopathy:     He has no cervical adenopathy.   Neurological: He is alert and oriented to person, place, and time.   Skin: Skin is warm and dry.   Psychiatric: He has a normal mood and affect. His behavior is normal. Judgment and thought content normal.   Vitals reviewed.      Assessment:  1. Soft tissue mass    2. HTN (hypertension), benign    3. Hypercholesterolemia    4. Essential hypertension    5. Hyperlipidemia, unspecified hyperlipidemia type        Plan:  Cuate was seen today for hypertension.    Diagnoses and all orders for this visit:    Soft tissue mass  -     US Soft Tissue Misc; Future  -     Ambulatory Referral to Dermatology  -     Ambulatory Referral to Dermatology  Redness and tenderness c/f cellulitis   Will give keflex   Check US and refer to derm for bx if sx do not resolve   Consider referral to rheum pending those results and clinical course    HTN (hypertension), benign  -     amLODIPine (NORVASC) 5 MG tablet; Take 1 tablet (5 mg total) by mouth once daily.  Controlled, cont med     Hypercholesterolemia  -     amLODIPine (NORVASC) 5 MG tablet; Take 1 tablet (5 mg total) by mouth once daily.  Stable, cont med     Essential " hypertension  -     losartan (COZAAR) 50 MG tablet; Take 1 tablet (50 mg total) by mouth once daily.    Hyperlipidemia, unspecified hyperlipidemia type  -     atorvastatin (LIPITOR) 40 MG tablet; Take 1 tablet (40 mg total) by mouth once daily.    Other orders  -     Influenza - Quadrivalent (3 years & older) w/ Preservative  -     cephALEXin (KEFLEX) 500 MG capsule; Take 1 capsule (500 mg total) by mouth every 6 (six) hours. for 10 days  -     sildenafil (VIAGRA) 50 MG tablet; Take 1 tablet (50 mg total) by mouth daily as needed for Erectile Dysfunction (take 30 min prior to sexual activity).    ED: mika viagra in past - will refill rx     Health Maintenance   Topic Date Due    Pneumococcal Vaccine (Highest Risk) (1 of 3 - PCV13) 05/14/1976    Colonoscopy  07/26/2020    Lipid Panel  12/04/2020    TETANUS VACCINE  11/09/2028    Hepatitis C Screening  Completed

## 2020-01-09 ENCOUNTER — IMMUNIZATION (OUTPATIENT)
Dept: PHARMACY | Facility: CLINIC | Age: 65
End: 2020-01-09
Payer: COMMERCIAL

## 2020-01-09 ENCOUNTER — TELEPHONE (OUTPATIENT)
Dept: INTERNAL MEDICINE | Facility: CLINIC | Age: 65
End: 2020-01-09

## 2020-01-09 NOTE — TELEPHONE ENCOUNTER
Called pt and LVM stating message from PCP below   Left office number for pt to return office call

## 2020-01-09 NOTE — TELEPHONE ENCOUNTER
Please notify pt that I received feedback from the hematologist about labs ordered at his recent appt - overall labs were in good range and the positive MARY was not though to be significant at this time.  No further evaluation is indicated at this time - good news overall

## 2020-02-14 ENCOUNTER — PATIENT OUTREACH (OUTPATIENT)
Dept: ADMINISTRATIVE | Facility: OTHER | Age: 65
End: 2020-02-14

## 2020-02-17 ENCOUNTER — OFFICE VISIT (OUTPATIENT)
Dept: DERMATOLOGY | Facility: CLINIC | Age: 65
End: 2020-02-17
Payer: COMMERCIAL

## 2020-02-17 DIAGNOSIS — L98.9 DISEASE OF SKIN AND SUBCUTANEOUS TISSUE: Primary | ICD-10-CM

## 2020-02-17 PROCEDURE — 11104 PR PUNCH BIOPSY, SKIN, SINGLE LESION: ICD-10-PCS | Mod: S$GLB,,, | Performed by: DERMATOLOGY

## 2020-02-17 PROCEDURE — 11104 PUNCH BX SKIN SINGLE LESION: CPT | Mod: S$GLB,,, | Performed by: DERMATOLOGY

## 2020-02-17 PROCEDURE — 88312 SPECIAL STAINS GROUP 1: CPT | Mod: 26,,, | Performed by: PATHOLOGY

## 2020-02-17 PROCEDURE — 99202 OFFICE O/P NEW SF 15 MIN: CPT | Mod: 25,S$GLB,, | Performed by: DERMATOLOGY

## 2020-02-17 PROCEDURE — 88305 TISSUE EXAM BY PATHOLOGIST: CPT | Performed by: PATHOLOGY

## 2020-02-17 PROCEDURE — 99999 PR PBB SHADOW E&M-EST. PATIENT-LVL III: CPT | Mod: PBBFAC,,, | Performed by: DERMATOLOGY

## 2020-02-17 PROCEDURE — 88312 PR  SPECIAL STAINS,GROUP I: ICD-10-PCS | Mod: 26,,, | Performed by: PATHOLOGY

## 2020-02-17 PROCEDURE — 99202 PR OFFICE/OUTPT VISIT, NEW, LEVL II, 15-29 MIN: ICD-10-PCS | Mod: 25,S$GLB,, | Performed by: DERMATOLOGY

## 2020-02-17 PROCEDURE — 88312 SPECIAL STAINS GROUP 1: CPT | Performed by: PATHOLOGY

## 2020-02-17 PROCEDURE — 88305 TISSUE EXAM BY PATHOLOGIST: CPT | Mod: 26,,, | Performed by: PATHOLOGY

## 2020-02-17 PROCEDURE — 99999 PR PBB SHADOW E&M-EST. PATIENT-LVL III: ICD-10-PCS | Mod: PBBFAC,,, | Performed by: DERMATOLOGY

## 2020-02-17 PROCEDURE — 88305 TISSUE EXAM BY PATHOLOGIST: ICD-10-PCS | Mod: 26,,, | Performed by: PATHOLOGY

## 2020-02-17 NOTE — LETTER
February 17, 2020      Carolyne Beth MD  [address]  [facility] [address]           [facility]  [address]  [place] [address]  Phone: [phone]  Fax: [phone]          Patient: Cuate Fair   MR Number: [id number]   YOB: 1955   Date of Visit: 2/17/2020       Dear Dr. Carolyne Beth:    Thank you for referring Cuate Fair to me for evaluation. Attached you will find relevant portions of my assessment and plan of care.    If you have questions, please do not hesitate to call me. I look forward to following Cuate Fair along with you.    Sincerely,    Lena Arellano MD    Enclosure  CC:  No Recipients    If you would like to receive this communication electronically, please contact [email or web address] or [phone] to request more information on [facility] access.    For providers and/or their staff who would like to refer a patient to [facility], please contact us through our one-stop-shop provider referral line, [facility], at [phone].    If you feel you have received this communication in error or would no longer like to receive these types of communications, please e-mail [email or web address]

## 2020-02-17 NOTE — PROGRESS NOTES
Subjective:       Patient ID:  Cuate Fair is a 64 y.o. male who presents for   Chief Complaint   Patient presents with    Spot     both lower leg      Started htn meds and chol med 2 years ago    Spot  - Initial  Affected locations: left lower leg and right lower leg  Duration: 1 year  Signs / symptoms: tender  Timing: intermittent  Aggravated by: nothing  Relieving factors/Treatments tried: Rx topical steroids (elocon cream)        Review of Systems   Skin: Negative for itching and rash.   Hematologic/Lymphatic: Does not bruise/bleed easily.        Objective:    Physical Exam   Constitutional: He appears well-developed and well-nourished. No distress.   Neurological: He is alert and oriented to person, place, and time. He is not disoriented.   Psychiatric: He has a normal mood and affect.   Skin:   Areas Examined (abnormalities noted in diagram):   RLE Inspected  LLE Inspection Performed              Diagram Legend     Erythematous scaling macule/papule c/w actinic keratosis       Vascular papule c/w angioma      Pigmented verrucoid papule/plaque c/w seborrheic keratosis      Yellow umbilicated papule c/w sebaceous hyperplasia      Irregularly shaped tan macule c/w lentigo     1-2 mm smooth white papules consistent with Milia      Movable subcutaneous cyst with punctum c/w epidermal inclusion cyst      Subcutaneous movable cyst c/w pilar cyst      Firm pink to brown papule c/w dermatofibroma      Pedunculated fleshy papule(s) c/w skin tag(s)      Evenly pigmented macule c/w junctional nevus     Mildly variegated pigmented, slightly irregular-bordered macule c/w mildly atypical nevus      Flesh colored to evenly pigmented papule c/w intradermal nevus       Pink pearly papule/plaque c/w basal cell carcinoma      Erythematous hyperkeratotic cursted plaque c/w SCC      Surgical scar with no sign of skin cancer recurrence      Open and closed comedones      Inflammatory papules and pustules      Verrucoid  papule consistent consistent with wart     Erythematous eczematous patches and plaques     Dystrophic onycholytic nail with subungual debris c/w onychomycosis     Umbilicated papule    Erythematous-base heme-crusted tan verrucoid plaque consistent with inflamed seborrheic keratosis     Erythematous Silvery Scaling Plaque c/w Psoriasis     See annotation    Lab Results   Component Value Date    WBC 4.00 12/19/2019    HGB 16.1 12/19/2019    HCT 49.7 12/19/2019    MCV 91 12/19/2019     12/19/2019       CMP  Sodium   Date Value Ref Range Status   12/04/2019 141 136 - 145 mmol/L Final     Potassium   Date Value Ref Range Status   12/04/2019 4.0 3.5 - 5.1 mmol/L Final     Chloride   Date Value Ref Range Status   12/04/2019 106 95 - 110 mmol/L Final     CO2   Date Value Ref Range Status   12/04/2019 25 23 - 29 mmol/L Final     Glucose   Date Value Ref Range Status   12/04/2019 100 70 - 110 mg/dL Final     BUN, Bld   Date Value Ref Range Status   12/04/2019 17 8 - 23 mg/dL Final     Creatinine   Date Value Ref Range Status   12/04/2019 1.2 0.5 - 1.4 mg/dL Final     Calcium   Date Value Ref Range Status   12/04/2019 9.2 8.7 - 10.5 mg/dL Final     Total Protein   Date Value Ref Range Status   12/04/2019 7.1 6.0 - 8.4 g/dL Final     Albumin   Date Value Ref Range Status   12/04/2019 4.1 3.5 - 5.2 g/dL Final     Total Bilirubin   Date Value Ref Range Status   12/04/2019 0.8 0.1 - 1.0 mg/dL Final     Comment:     For infants and newborns, interpretation of results should be based  on gestational age, weight and in agreement with clinical  observations.  Premature Infant recommended reference ranges:  Up to 24 hours.............<8.0 mg/dL  Up to 48 hours............<12.0 mg/dL  3-5 days..................<15.0 mg/dL  6-29 days.................<15.0 mg/dL       Alkaline Phosphatase   Date Value Ref Range Status   12/04/2019 64 55 - 135 U/L Final     AST   Date Value Ref Range Status   12/04/2019 21 10 - 40 U/L Final     ALT    Date Value Ref Range Status   12/04/2019 23 10 - 44 U/L Final     Anion Gap   Date Value Ref Range Status   12/04/2019 10 8 - 16 mmol/L Final     eGFR if    Date Value Ref Range Status   12/04/2019 >60 >60 mL/min/1.73 m^2 Final     eGFR if non    Date Value Ref Range Status   12/04/2019 >60 >60 mL/min/1.73 m^2 Final     Comment:     Calculation used to obtain the estimated glomerular filtration  rate (eGFR) is the CKD-EPI equation.        No results found for: MARY              Assessment / Plan:      Pathology Orders:     Normal Orders This Visit    Specimen to Pathology, Dermatology     Questions:    Procedure Type:  Dermatology and skin neoplasms    Number of Specimens:  1    ------------------------:  -------------------------    Spec 1 Procedure:  Biopsy    Spec 1 Clinical Impression:  r/o E. Nodosum    Spec 1 Source:  right lower medial leg        Disease of skin and subcutaneous tissue  -     Specimen to Pathology, Dermatology    Punch biopsy procedure note:  Punch biopsy performed after verbal consent obtained. Area marked and prepped with alcohol. Approximately 1cc of 1% lidocaine with epinephrine injected. 4 mm disposable punch used to remove lesion. Hemostasis obtained and biopsy site closed with 1 - 2 Prolene sutures. Wound care instructions reviewed with patient and handout given.    RICE - N - discussed with pt. Pt on feet a lot at work           Follow up in about 2 weeks (around 3/2/2020).

## 2020-02-17 NOTE — PATIENT INSTRUCTIONS
"Punch Biopsy Wound Care    Your doctor has performed a punch biopsy today.  A band aid and antibiotic ointment has been placed over the site.  This should remain in place for 24 hours.  It is recommended that you keep the area dry for the first 24 hours.  After 24 hours, you may remove the band aid and wash the area with warm soap and water and apply Vaseline jelly.  Many patients prefer to use Neosporin or Bacitracin ointment.  This is acceptable; however know that you can develop an allergy to this medication even if you have used it safely for years.  It is important to keep the area moist.  Letting it dry out and get air slows healing time, will worsen the scar, and make it more difficult to remove the stitches if they were placed.  Band aid is optional after first 24 hours.      If you notice increasing redness, tenderness, pain, or yellow drainage at the biopsy or surgical site, please notify your doctor.  These are signs of an infection.    If your biopsy/surgical site is bleeding, apply firm pressure for 15 minutes straight.  Repeat for another 15 minutes, if it is still bleeding.   If the surgical site continues to bleed, then please contact your doctor.      For [facility] users:   You will receive a [facility] notification after the pathologist has finished reviewing your biopsy specimen. Pathology results, however, will not be released online so you will see a "no content" message. Once your dermatologist reviews and clinically correlates your biopsy results, you will either receive a letter in the mail with the results of a phone call from your doctor's office if further explanation or treatment is warranted.       [address] [place] [address]/ [phone] FAX/ [email or web address]         "

## 2020-02-20 ENCOUNTER — TELEPHONE (OUTPATIENT)
Dept: DERMATOLOGY | Facility: CLINIC | Age: 65
End: 2020-02-20

## 2020-02-20 NOTE — TELEPHONE ENCOUNTER
----- Message from Jazlyn Lock RN sent at 2/20/2020  9:47 AM CST -----  Contact: WINIFRED FERNANDEZ [[id number]]  Augustina Hastings,     I tried to contact this patient twice yesterday. I was going to offer him 3/2 at 250pm (I think). Maybe you can try him later?     Thanks,   K  ----- Message -----  From: Lena Arellano MD  Sent: 2/19/2020  12:21 PM CST  To: Jazlyn Lock RN    Sure. JAM    ----- Message -----  From: Jazlyn Lock RN  Sent: 2/19/2020  10:30 AM CST  To: MD Augustina Del Valle,     This pt needs to come back in 2 weeks, however, you are completely booked, of course. He asked if he could come in around 2 or 3p. Would you like me to override your schedule?     Thanks,   K  ----- Message -----  From: Clotilde Villegas  Sent: 2/19/2020   9:43 AM CST  To: [facility]ared[facility] Staff    Type: Patient Call Back    Who called:WINIFRED FERNANDEZ [[id number]]    What is the request in detail: Patient is requesting a call back. He states he needs to schedule a two week follow up appointment.  Patient states he has not received a call regarding scheduling this appointment.    Please advise.    Can the clinic reply by [facility]? No    Best call back number: [phone]    Additional Information: N/A

## 2020-02-22 LAB
FINAL PATHOLOGIC DIAGNOSIS: NORMAL
GROSS: NORMAL
MICROSCOPIC EXAM: NORMAL

## 2020-03-01 ENCOUNTER — PATIENT OUTREACH (OUTPATIENT)
Dept: ADMINISTRATIVE | Facility: OTHER | Age: 65
End: 2020-03-01

## 2020-03-03 ENCOUNTER — HOSPITAL ENCOUNTER (OUTPATIENT)
Dept: RADIOLOGY | Facility: HOSPITAL | Age: 65
Discharge: HOME | End: 2020-03-03
Attending: DERMATOLOGY
Payer: COMMERCIAL

## 2020-03-03 ENCOUNTER — OFFICE VISIT (OUTPATIENT)
Dept: DERMATOLOGY | Facility: CLINIC | Age: 65
End: 2020-03-03
Payer: COMMERCIAL

## 2020-03-03 DIAGNOSIS — L52 ERYTHEMA NODOSUM: Primary | ICD-10-CM

## 2020-03-03 DIAGNOSIS — L52 ERYTHEMA NODOSUM: ICD-10-CM

## 2020-03-03 PROCEDURE — 71046 XR CHEST PA AND LATERAL: ICD-10-PCS | Mod: 26,,, | Performed by: RADIOLOGY

## 2020-03-03 PROCEDURE — 99999 PR PBB SHADOW E&M-EST. PATIENT-LVL III: CPT | Mod: PBBFAC,,, | Performed by: DERMATOLOGY

## 2020-03-03 PROCEDURE — 99213 PR OFFICE/OUTPT VISIT, EST, LEVL III, 20-29 MIN: ICD-10-PCS | Mod: S$GLB,,, | Performed by: DERMATOLOGY

## 2020-03-03 PROCEDURE — 71046 X-RAY EXAM CHEST 2 VIEWS: CPT | Mod: 26,,, | Performed by: RADIOLOGY

## 2020-03-03 PROCEDURE — 71046 X-RAY EXAM CHEST 2 VIEWS: CPT | Mod: TC,FY

## 2020-03-03 PROCEDURE — 99213 OFFICE O/P EST LOW 20 MIN: CPT | Mod: S$GLB,,, | Performed by: DERMATOLOGY

## 2020-03-03 PROCEDURE — 99999 PR PBB SHADOW E&M-EST. PATIENT-LVL III: ICD-10-PCS | Mod: PBBFAC,,, | Performed by: DERMATOLOGY

## 2020-03-03 NOTE — Clinical Note
Saw you pt for red nodules LE's x 1 year. bx done and c/w E. Nodosum. Reviewed labs and ROS and needs CXR and quant gold which I ordered. Pt is also due for colonoscopy. ALFONSO

## 2020-03-03 NOTE — PATIENT INSTRUCTIONS
Recommend:    RICE - N  Rest  Ice  Compression  Leg Elevation  NSAIDS - round the clock (ie aleve) x 2 - 4 weeks to see if improves

## 2020-03-03 NOTE — PROGRESS NOTES
Subjective:       Patient ID:  Cuate Fair is a 64 y.o. male who presents for   Chief Complaint   Patient presents with    Follow-up     spot spike legs      Suture / Staple Removal     biopsy right lower leg     Last seen 2/17/20 for bx of 1 year h/o tender nodules on LE's. 1 new lesion since last visit. Not following RICE-N as suggested    Bx:  Skin, right lower medial leg, punch biopsy:  -ERYTHEMA NODOSUM, consistent with    Suture / Staple Removal         Review of Systems   Skin: Negative for itching and rash.   Hematologic/Lymphatic: Does not bruise/bleed easily.        Objective:    Physical Exam   Constitutional: He appears well-developed and well-nourished. No distress.   Neurological: He is alert and oriented to person, place, and time. He is not disoriented.   Psychiatric: He has a normal mood and affect.   Skin:   Areas Examined (abnormalities noted in diagram):   RLE Inspected  LLE Inspection Performed              Diagram Legend     Erythematous scaling macule/papule c/w actinic keratosis       Vascular papule c/w angioma      Pigmented verrucoid papule/plaque c/w seborrheic keratosis      Yellow umbilicated papule c/w sebaceous hyperplasia      Irregularly shaped tan macule c/w lentigo     1-2 mm smooth white papules consistent with Milia      Movable subcutaneous cyst with punctum c/w epidermal inclusion cyst      Subcutaneous movable cyst c/w pilar cyst      Firm pink to brown papule c/w dermatofibroma      Pedunculated fleshy papule(s) c/w skin tag(s)      Evenly pigmented macule c/w junctional nevus     Mildly variegated pigmented, slightly irregular-bordered macule c/w mildly atypical nevus      Flesh colored to evenly pigmented papule c/w intradermal nevus       Pink pearly papule/plaque c/w basal cell carcinoma      Erythematous hyperkeratotic cursted plaque c/w SCC      Surgical scar with no sign of skin cancer recurrence      Open and closed comedones      Inflammatory papules and  pustules      Verrucoid papule consistent consistent with wart     Erythematous eczematous patches and plaques     Dystrophic onycholytic nail with subungual debris c/w onychomycosis     Umbilicated papule    Erythematous-base heme-crusted tan verrucoid plaque consistent with inflamed seborrheic keratosis     Erythematous Silvery Scaling Plaque c/w Psoriasis     See annotation    Pt with neg:  LDH, IG, TSH, TSH< T4, SPEP, PSA, CBC, CMP, lipid panel, HIV and U/A    Pt with positive: MARY 1:160 (likely nonspecific), elev eos at 9.7%, ANC 1.3, Hep B surface AB and core AB - prev infection -- cleared  Assessment / Plan:        Erythema nodosum    Recommend:    RICE - N  Rest  Ice  Compression  Leg Elevation  NSAIDS - round the clock (ie aleve) x 2 - 4 weeks to see if improves    -     Quantiferon Gold TB; Future; Expected date: 03/03/2020  -     X-Ray Chest PA And Lateral; Future; Expected date: 03/03/2020             Follow up in about 3 months (around 6/3/2020).

## 2020-03-04 ENCOUNTER — TELEPHONE (OUTPATIENT)
Dept: INTERNAL MEDICINE | Facility: CLINIC | Age: 65
End: 2020-03-04

## 2020-03-04 DIAGNOSIS — Z12.11 SCREENING FOR COLON CANCER: Primary | ICD-10-CM

## 2020-03-06 PROBLEM — L52 ERYTHEMA NODOSUM: Status: ACTIVE | Noted: 2020-03-06

## 2020-03-20 ENCOUNTER — TELEPHONE (OUTPATIENT)
Dept: INTERNAL MEDICINE | Facility: CLINIC | Age: 65
End: 2020-03-20

## 2020-03-20 ENCOUNTER — NURSE TRIAGE (OUTPATIENT)
Dept: ADMINISTRATIVE | Facility: CLINIC | Age: 65
End: 2020-03-20

## 2020-03-20 NOTE — TELEPHONE ENCOUNTER
"----- Message from Gosia Mendieta sent at 3/20/2020  8:13 AM CDT -----  Contact: WINIFRED FERNANDEZ [[id number]]  Patient has Symptoms and is at risk for COVID-19 (Coronavirus).      Patient needs care urgently and is showing the following symptoms: Fever 104 (last night), cough, weak/tired     Patient is requesting appointment for: Today     Patient is under "RED DOT" protocol.  "

## 2020-03-20 NOTE — TELEPHONE ENCOUNTER
Called pt and reports Monday started     Fevers 101 at onset, cough -intermittently productive - green, runny nose - green, sinus congestion and sore throat  Last fever yesterday 100.4 -  - he reports that his temp was never that high  Hoarse voice   - no facial or ear pain   - no sob or wheezing or orthopnea   - taking otc meds - coricidin hbp, apap prn     Had flu vaccine     Today feeling better - reports sx have improved - less body aches, less sneezing  Main question is that wife wanted to know if he was contagious   I instructed him that yes - assume that he is   - currently he does not meet criteria for covid testing but counseled him extensively on ER and RTC prompts   - rec that he stay utd with CDC testing guideline and if he feels that he meets criteria in near future to please let me know   Recommend that he remain at home and self isolate and recommend that his family (wife) who has had direct contact with him as well also remain at home for next 7-14 days for now  All questions were answered and pt verbalized understanding of plan.

## 2020-03-20 NOTE — TELEPHONE ENCOUNTER
- Symptoms: fever, productive cough - greenish colored sputum or body aches    Spoke with pt: spoke with primary MD. Was instructed to Stay home. Take OTC meds. Coricidin, monitor temp.I instructed pt for any  SOB go to ED. Pt verbalizes understanding. I reinforced, rest hydration, social distancing and nutrition. Pt verbalizes understanding.        Reason for Disposition   Health Information question, no triage required and triager able to answer question    Additional Information   Negative: Requesting regular office appointment   Negative: [1] Caller requesting NON-URGENT health information AND [2] PCP's office is the best resource    Protocols used: INFORMATION ONLY CALL-A-

## 2020-03-20 NOTE — TELEPHONE ENCOUNTER
Q: Have you traveled from one of the affected geographic areas within the last 14 days?   A: No   -  Q: Have you been in close contact with a laboratory-confirmed COVID-19 patient within the last 14 days?   A: No       Q: Are you having any symptoms at this time?  A: Yes  - Symptoms: fever, productive cough - greenish colored sputum or body aches     ER prompts discussed with the patient.  Patient advised to visit the CDC's COVID-19 website for ongoing updates.

## 2020-06-23 DIAGNOSIS — E78.5 HYPERLIPIDEMIA, UNSPECIFIED HYPERLIPIDEMIA TYPE: ICD-10-CM

## 2020-06-23 RX ORDER — ATORVASTATIN CALCIUM 40 MG/1
40 TABLET, FILM COATED ORAL DAILY
Qty: 90 TABLET | Refills: 3 | Status: SHIPPED | OUTPATIENT
Start: 2020-06-23 | End: 2020-12-09 | Stop reason: SDUPTHER

## 2020-09-09 ENCOUNTER — OFFICE VISIT (OUTPATIENT)
Dept: INTERNAL MEDICINE | Facility: CLINIC | Age: 65
End: 2020-09-09
Attending: INTERNAL MEDICINE
Payer: COMMERCIAL

## 2020-09-09 ENCOUNTER — LAB VISIT (OUTPATIENT)
Dept: LAB | Facility: OTHER | Age: 65
End: 2020-09-09
Attending: INTERNAL MEDICINE
Payer: COMMERCIAL

## 2020-09-09 VITALS
SYSTOLIC BLOOD PRESSURE: 116 MMHG | DIASTOLIC BLOOD PRESSURE: 78 MMHG | WEIGHT: 168.88 LBS | OXYGEN SATURATION: 97 % | HEART RATE: 75 BPM | BODY MASS INDEX: 25.59 KG/M2 | HEIGHT: 68 IN

## 2020-09-09 DIAGNOSIS — Z12.11 SCREENING FOR COLON CANCER: ICD-10-CM

## 2020-09-09 DIAGNOSIS — R76.8 POSITIVE ANA (ANTINUCLEAR ANTIBODY): ICD-10-CM

## 2020-09-09 DIAGNOSIS — L52 ERYTHEMA NODOSUM: ICD-10-CM

## 2020-09-09 DIAGNOSIS — Z77.21 EXPOSURE TO POTENTIALLY HAZARDOUS BODY FLUIDS: ICD-10-CM

## 2020-09-09 DIAGNOSIS — F41.9 ANXIETY: ICD-10-CM

## 2020-09-09 DIAGNOSIS — Z00.00 ANNUAL PHYSICAL EXAM: Primary | ICD-10-CM

## 2020-09-09 PROCEDURE — 3074F SYST BP LT 130 MM HG: CPT | Mod: CPTII,S$GLB,, | Performed by: INTERNAL MEDICINE

## 2020-09-09 PROCEDURE — 3008F PR BODY MASS INDEX (BMI) DOCUMENTED: ICD-10-PCS | Mod: CPTII,S$GLB,, | Performed by: INTERNAL MEDICINE

## 2020-09-09 PROCEDURE — 99999 PR PBB SHADOW E&M-EST. PATIENT-LVL V: ICD-10-PCS | Mod: PBBFAC,,, | Performed by: INTERNAL MEDICINE

## 2020-09-09 PROCEDURE — 3008F BODY MASS INDEX DOCD: CPT | Mod: CPTII,S$GLB,, | Performed by: INTERNAL MEDICINE

## 2020-09-09 PROCEDURE — 99396 PR PREVENTIVE VISIT,EST,40-64: ICD-10-PCS | Mod: S$GLB,,, | Performed by: INTERNAL MEDICINE

## 2020-09-09 PROCEDURE — 86780 TREPONEMA PALLIDUM: CPT

## 2020-09-09 PROCEDURE — 99999 PR PBB SHADOW E&M-EST. PATIENT-LVL V: CPT | Mod: PBBFAC,,, | Performed by: INTERNAL MEDICINE

## 2020-09-09 PROCEDURE — 36415 COLL VENOUS BLD VENIPUNCTURE: CPT

## 2020-09-09 PROCEDURE — 3074F PR MOST RECENT SYSTOLIC BLOOD PRESSURE < 130 MM HG: ICD-10-PCS | Mod: CPTII,S$GLB,, | Performed by: INTERNAL MEDICINE

## 2020-09-09 PROCEDURE — 3078F DIAST BP <80 MM HG: CPT | Mod: CPTII,S$GLB,, | Performed by: INTERNAL MEDICINE

## 2020-09-09 PROCEDURE — 99396 PREV VISIT EST AGE 40-64: CPT | Mod: S$GLB,,, | Performed by: INTERNAL MEDICINE

## 2020-09-09 PROCEDURE — 3078F PR MOST RECENT DIASTOLIC BLOOD PRESSURE < 80 MM HG: ICD-10-PCS | Mod: CPTII,S$GLB,, | Performed by: INTERNAL MEDICINE

## 2020-09-09 PROCEDURE — 87517 HEPATITIS B DNA QUANT: CPT

## 2020-09-09 NOTE — PATIENT INSTRUCTIONS
Culturelle or align probiotic - this is over the counter.     Call to make an appointment within [facility] for psychiatry/psychology [phone]     Other psychiatrists:   Clotilde Prado(psychiatrist) [address] [facility] [address] Phone: [phone]   Derek Oneal (psychiatrist) [phone], [phone] [address] [facility]   Dr. Juan Carranza - [phone]   Dr. Lady Orozco - [phone]   Dr. Jahaira Wagoner - [phone]   Dr. Slim Martinez - [phone]     [facility]: [phone]     Therapy/Psychology:   You can try anyone of these number to see if your insurance is accepted or you would have to call your insurance.     [facility] ([facility]   Address: [facility] [place] [address]   Phone: [phone]   [email or web address]     [facility] [address], [address]   Phone: [phone]   You can email for an appointment at: [email or web address]     [facility]   [address] [place] [address] [address], [facility], [address]   [email or web address]   Dr. Margi De La Rosa, [phone] or [email or web address]   Dr. Tamia Foote, [phone] or [email or web address]     Antonina Lopez LCSW (therapist) [phone]   [address] [facility]   Lena Staton LCSW (therapist) [phone]   [address] [facility]   Shannon Colvin LCSW (therapist) [phone]   [address] [place] [address]   APURVA Oneal         [facility]                    [phone]   David Kennedy [phone] (therapist) [address] [facility]   Johnson Staton (therapist) [phone]  [address] [facility]   Julio Pena (therapist) [phone] [address] [facility]     [facility] [phone]   [facility] [place] [address]     Employee Assistance Program (EAP)   Check through your employer's HR.     Online Therapist:     [email or web address]     Free Guided Meditations    [email or web address]   [email or web address]   [email or web address]

## 2020-09-09 NOTE — PROGRESS NOTES
"Subjective:   Patient ID: Cuate Fair is a 65 y.o. male  Chief complaint:   Chief Complaint   Patient presents with    Annual Exam     bad breath    Leg Pain       HPI  Here for annual exam     EN: dx by biopsy  - quant gold negative  - cxr wnl   TSH wnl    - denies sore throat    - MARY positive 1:160 homogenous with neg reflex   - pt denies joint pain, rashes, morning stiffness      - does have hx of hep b that he was prev told that his body cleared   - hep b core ab +, hep b surf ab +  - hep b surf antigen neg and hep b e ant neg  - HIV neg    - seen by h/o for thrombocytopenia and neutropenia prior to this dx - spep and immunifix wnl  immunoglob wnl    - psa wnl    No diarrhea, sore throat, fevers, joint swelling or pain or inc warmth or redness   - no EN lesions on bilateral legs at this time     Wife has lupus - skin   In counseling with wife a year ago due to marital issues that he is working through     HTN:   Reports checking intermittently   - well controlled today  - taking bp meds in am - amlodipine 5mg   - losartan stopped and LH resolved      previously:   - seen by cards - had holter completed - no further dizziness   - started CCB 5mg and losartan 50mg and tolerating   - seen by neuro - cartodid US with no significant narrowing  - reviewed notes      HLD: taking statin nightly - tolerating well      B12: taking b12 daily - level improved     Review of Systems    Objective:  Vitals:    09/09/20 1412   BP: 116/78   BP Location: Left arm   Patient Position: Sitting   Pulse: 75   SpO2: 97%   Weight: 76.6 kg (168 lb 14 oz)   Height: 5' 8" (1.727 m)     Body mass index is 25.68 kg/m².    Physical Exam  Vitals signs reviewed.   Constitutional:       Appearance: Normal appearance. He is well-developed.   HENT:      Head: Normocephalic and atraumatic.      Right Ear: Tympanic membrane, ear canal and external ear normal.      Left Ear: Tympanic membrane, ear canal and external ear normal.      Nose: "      Comments: Wearing mask   Eyes:      Extraocular Movements: Extraocular movements intact.      Conjunctiva/sclera: Conjunctivae normal.   Neck:      Musculoskeletal: Neck supple.      Thyroid: No thyromegaly.   Cardiovascular:      Rate and Rhythm: Normal rate and regular rhythm.      Pulses: Normal pulses.      Heart sounds: Normal heart sounds.   Pulmonary:      Effort: Pulmonary effort is normal.      Breath sounds: Normal breath sounds.   Abdominal:      General: Bowel sounds are normal.      Palpations: Abdomen is soft.   Musculoskeletal:         General: No swelling or tenderness.   Lymphadenopathy:      Cervical: No cervical adenopathy.   Skin:     General: Skin is warm and dry.      Capillary Refill: Capillary refill takes less than 2 seconds.   Neurological:      General: No focal deficit present.      Mental Status: He is alert and oriented to person, place, and time.   Psychiatric:         Mood and Affect: Mood normal.         Behavior: Behavior normal.         Thought Content: Thought content normal.         Judgment: Judgment normal.         Assessment:  1. Annual physical exam    2. Screening for colon cancer    3. Exposure to potentially hazardous body fluids    4. Erythema nodosum    5. Positive MARY (antinuclear antibody)    6. Anxiety        Plan:  Cuate was seen today for annual exam and leg pain.    Diagnoses and all orders for this visit:    Annual physical exam    Screening for colon cancer  -     Case request GI: COLONOSCOPY    Exposure to potentially hazardous body fluids  -     HEPATITIS B VIRAL DNA, QUANTITATIVE; Future  -     FTA ANTIBODIES, IGG AND IGM; Future  -     C. trachomatis/N. gonorrhoeae by AMP DNA; Future    Erythema nodosum  -     Ambulatory referral/consult to Rheumatology; Future    Positive MARY (antinuclear antibody)  -     Ambulatory referral/consult to Rheumatology; Future    Anxiety  -     Ambulatory referral/consult to Psychology; Future    Recommend daily  sunscreen, cardiovascular exercise min 30 min 5 days per week. Seatbelts routinely.  F/u with rheum with dx of EN, thrombocytopenia, + MARY  STD screening   Due for cscope, flu vaccine, shingrix   Cont current meds     Health Maintenance   Topic Date Due    Pneumococcal Vaccine (Highest Risk) (1 of 3 - PCV13) 05/14/1976    Lipid Panel  12/04/2020    TETANUS VACCINE  11/09/2028    Hepatitis C Screening  Completed

## 2020-09-14 LAB
HBV DNA SERPL NAA+PROBE-ACNC: <10 IU/ML
HBV DNA SERPL NAA+PROBE-LOG IU: <1 LOG (10) IU/ML
HBV DNA SERPL QL NAA+PROBE: NOT DETECTED
T PALLIDUM AB SER QL IF: NORMAL

## 2020-09-15 ENCOUNTER — TELEPHONE (OUTPATIENT)
Dept: INTERNAL MEDICINE | Facility: CLINIC | Age: 65
End: 2020-09-15

## 2020-09-15 NOTE — TELEPHONE ENCOUNTER
Please notify pt that hepatitis B lab was negative and screening test for syphilis was negative as well - good news!

## 2020-09-16 ENCOUNTER — PATIENT OUTREACH (OUTPATIENT)
Dept: ADMINISTRATIVE | Facility: OTHER | Age: 65
End: 2020-09-16

## 2020-09-17 ENCOUNTER — OFFICE VISIT (OUTPATIENT)
Dept: RHEUMATOLOGY | Facility: CLINIC | Age: 65
End: 2020-09-17
Payer: COMMERCIAL

## 2020-09-17 ENCOUNTER — LAB VISIT (OUTPATIENT)
Dept: LAB | Facility: HOSPITAL | Age: 65
End: 2020-09-17
Attending: INTERNAL MEDICINE
Payer: COMMERCIAL

## 2020-09-17 VITALS
DIASTOLIC BLOOD PRESSURE: 54 MMHG | HEIGHT: 68 IN | BODY MASS INDEX: 26.36 KG/M2 | WEIGHT: 173.94 LBS | HEART RATE: 70 BPM | SYSTOLIC BLOOD PRESSURE: 99 MMHG

## 2020-09-17 DIAGNOSIS — R76.8 POSITIVE ANA (ANTINUCLEAR ANTIBODY): ICD-10-CM

## 2020-09-17 DIAGNOSIS — M79.3 PANNICULITIS: ICD-10-CM

## 2020-09-17 DIAGNOSIS — M79.3 PANNICULITIS: Primary | ICD-10-CM

## 2020-09-17 DIAGNOSIS — L52 ERYTHEMA NODOSUM: ICD-10-CM

## 2020-09-17 LAB
C3 SERPL-MCNC: 122 MG/DL (ref 50–180)
C4 SERPL-MCNC: 29 MG/DL (ref 11–44)
CRP SERPL-MCNC: 0.7 MG/L (ref 0–8.2)
CRP SERPL-MCNC: 0.7 MG/L (ref 0–8.2)
ERYTHROCYTE [SEDIMENTATION RATE] IN BLOOD BY WESTERGREN METHOD: 6 MM/HR (ref 0–23)

## 2020-09-17 PROCEDURE — 36415 COLL VENOUS BLD VENIPUNCTURE: CPT

## 2020-09-17 PROCEDURE — 3008F BODY MASS INDEX DOCD: CPT | Mod: CPTII,S$GLB,, | Performed by: INTERNAL MEDICINE

## 2020-09-17 PROCEDURE — 83520 IMMUNOASSAY QUANT NOS NONAB: CPT

## 2020-09-17 PROCEDURE — 99205 OFFICE O/P NEW HI 60 MIN: CPT | Mod: S$GLB,,, | Performed by: INTERNAL MEDICINE

## 2020-09-17 PROCEDURE — 3074F SYST BP LT 130 MM HG: CPT | Mod: CPTII,S$GLB,, | Performed by: INTERNAL MEDICINE

## 2020-09-17 PROCEDURE — 3008F PR BODY MASS INDEX (BMI) DOCUMENTED: ICD-10-PCS | Mod: CPTII,S$GLB,, | Performed by: INTERNAL MEDICINE

## 2020-09-17 PROCEDURE — 3074F PR MOST RECENT SYSTOLIC BLOOD PRESSURE < 130 MM HG: ICD-10-PCS | Mod: CPTII,S$GLB,, | Performed by: INTERNAL MEDICINE

## 2020-09-17 PROCEDURE — 85652 RBC SED RATE AUTOMATED: CPT

## 2020-09-17 PROCEDURE — 86160 COMPLEMENT ANTIGEN: CPT | Mod: 59

## 2020-09-17 PROCEDURE — 86235 NUCLEAR ANTIGEN ANTIBODY: CPT

## 2020-09-17 PROCEDURE — 3078F DIAST BP <80 MM HG: CPT | Mod: CPTII,S$GLB,, | Performed by: INTERNAL MEDICINE

## 2020-09-17 PROCEDURE — 99999 PR PBB SHADOW E&M-EST. PATIENT-LVL IV: CPT | Mod: PBBFAC,,, | Performed by: INTERNAL MEDICINE

## 2020-09-17 PROCEDURE — 86140 C-REACTIVE PROTEIN: CPT

## 2020-09-17 PROCEDURE — 99999 PR PBB SHADOW E&M-EST. PATIENT-LVL IV: ICD-10-PCS | Mod: PBBFAC,,, | Performed by: INTERNAL MEDICINE

## 2020-09-17 PROCEDURE — 99205 PR OFFICE/OUTPT VISIT, NEW, LEVL V, 60-74 MIN: ICD-10-PCS | Mod: S$GLB,,, | Performed by: INTERNAL MEDICINE

## 2020-09-17 PROCEDURE — 86160 COMPLEMENT ANTIGEN: CPT

## 2020-09-17 PROCEDURE — 82164 ANGIOTENSIN I ENZYME TEST: CPT

## 2020-09-17 PROCEDURE — 3078F PR MOST RECENT DIASTOLIC BLOOD PRESSURE < 80 MM HG: ICD-10-PCS | Mod: CPTII,S$GLB,, | Performed by: INTERNAL MEDICINE

## 2020-09-17 RX ORDER — HYDROXYCHLOROQUINE SULFATE 200 MG/1
200 TABLET, FILM COATED ORAL 2 TIMES DAILY
Qty: 60 TABLET | Refills: 5 | Status: SHIPPED | OUTPATIENT
Start: 2020-09-17 | End: 2020-10-17

## 2020-09-17 ASSESSMENT — ROUTINE ASSESSMENT OF PATIENT INDEX DATA (RAPID3)
PAIN SCORE: 5
PSYCHOLOGICAL DISTRESS SCORE: 0
TOTAL RAPID3 SCORE: 2.17
FATIGUE SCORE: 2.5
PATIENT GLOBAL ASSESSMENT SCORE: 1.5
MDHAQ FUNCTION SCORE: 0

## 2020-09-17 NOTE — PROGRESS NOTES
Chief Complaint   Patient presents with    Disease Management       Patient was referred by      History of presenting illness    65 year old black male has     bruises on the legs that come and go  For 2 years now  Started in 2019 jan     Painful only when he touches it or rubbing it   Each episode lasts for a month     Can be red,swollen and blistered   More painful tender nodules     Dermatology says :    Erythema nodosum  Biopsy done :  Punch biopsy sections show a septal and lobular lympho histiocytic infiltrate with some neutrophils, rare  eosinophils, and rare multinucleated giant cells. PAS and AFB stains are negative for fungal and  mycobacterial organisms, respectively. Special stains were reviewed with adequate positive controls.  Compression socks help   He is asked to rest,ice,leg elevation      NSAIDs help with the flares           Labs     White count   He had numbers > 4 upto 2018 and then he had drop 3.44 and the last white count was 4   His numbers are always 3's and 4's  Aug 2013 he had a bad e coli UTI and that might have led to white count of 8.21   H/h nml  Borderline thrombocytopenia 130 to 155  nml lymphocyte count  Neutropenia but 1.2 to 2.1  Hepatitis b surface and core ab negative ,HBV DNA neg  HIV negative   Hepatitis c negative   CMP always ok    MARY Positive 1: 160,profile neg  Quant immunoglobulins nml  SPEP nml    Urine once really dirty and ok when rechecked   Ct abdomen   Fluid density foci in the kidneys bilaterally largest on the right measuring 3.2 cm with a separate focus in the left kidney measuring 1.4 cm and subcentimeter small focus in the right kidney anteriorly measuring 7 mm these all likely represent cysts   though cannot exclude cystic neoplasm follow-up dedicated renal imaging is advised.       Tb gold negative   FTA-ABS negative    CXR nml  Left leg US normal    Past history  Anxiety,HTN,HLD    Family history    Cancer       Social history    Not a  smoker,alcohol user    Review of Systems     No joint pains   No malar rash,photosensitivity   No telangiectasias   No calcinosis   No psoriasis   No patchy alopecia   No oral and nasal ulcers   No dry eyes and dry mouth   No pleurisy or any cardiopulmonary complaints   No dysphagia,diplopia and dysphonia and muscle weakness   No n/v/d/c   No acid reflux+   No raynaud's+   No digital ulcers   No renal issues   No blood clots   No fever,chills,night sweats,weight loss and loss of appetite   No new onset headaches   No recurrent conjunctivitis or uveitis or scleritis or episcleritis   No chronic or bloody diarrhea with no u colitis or crohn's /inflammatory bowel disease   No penile or urethral d/c/STDs/no ulcers  No unexplained neurologic disturbances  No unexplained lymphadenopathy/parotitis          Physical Exam   Constitutional: He is oriented to person, place, and time and well-developed, well-nourished, and in no distress. No distress.   HENT:   Head: Normocephalic.   Mouth/Throat: Oropharynx is clear and moist.   Eyes: Conjunctivae are normal. Pupils are equal, round, and reactive to light. Right eye exhibits no discharge. Left eye exhibits no discharge. No scleral icterus.   Neck: Normal range of motion. No thyromegaly present.   Cardiovascular: Normal rate, regular rhythm, normal heart sounds and intact distal pulses.    Pulmonary/Chest: Effort normal and breath sounds normal. No stridor.   Abdominal: Soft. Bowel sounds are normal.   Lymphadenopathy:     He has no cervical adenopathy.   Neurological: He is alert and oriented to person, place, and time.   Skin: Skin is warm. No rash noted. He is not diaphoretic.     Psychiatric: Affect and judgment normal.   Musculoskeletal: Normal range of motion.       There is an area of skin hardening on the medial aspect of the right calf  There is subcutaneous atrophy   There is hyperpigmentation of the skin along this area with tenderness      Assessment     65 year old  black male with HTN,HLD presents with   Painful nodules on the shins Jan 2019 which were biopsied and diagnosed as erythema nodosum,he did prn NSAIDs but mostly followed the RICE protocol when possible although its not feasible for him since his job requires him to up and walking 12 hours a day,compresson stockings make the lesions hurt more.  So really no intervention made since march 2020 when it was diagnosed  Today it appears to be different in appearance : there is an area of diffuse skin hardening on the medial aspect of the right calf with subcutaneous atrophy and pigmentation of the skin.It extends posteriorly with limited anterior shin involvement   They continue to be painful      1. Panniculitis    2. Erythema nodosum    3. Positive MARY (antinuclear antibody)          I think this looks like sclerosing panniculitis?  Indurated plaques with edema and pigmentation on the medial aspect  There is fibrosis and panniculitis of the subcutaneous fat  ??? Inverted wine bottle like in appearance       Discussed with the the causes of erythema nodosum    Bacterial   Streptococcal infection (the most common infectious cause)  Tuberculosis  Leprosy  Yersinia, Salmonella, Campylobacter gastroenteritis  Mycoplasma pneumonia  Tularemia  Leptospirosis  Brucellosis  Chlamydia trachomatis  Psittacosis  Lymphogranuloma venereum  Cat-scratch disease  Q fever (Coxiella burnetii infection)   Fungal   Coccidioidomycosis  Histoplasmosis  Blastomycosis   Viral   Infectious mononucleosis  Hepatitis B  Paravaccinia   Drugs   Oral contraceptives   Penicillin   Sulfonamides   Bromides and iodides   TNF-alpha inhibitors (rare)   Inflammatory bowel disease   Crohn's disease (more often than ulcerative colitis)   Ulcerative colitis   Malignancy   Lymphoma (Hodgkin, most often)   Leukemia (acute myelogenous, most often)   Internal carcinomas   Miscellaneous   Sarcoidosis   Pregnancy   Whipple disease   Behçet disease   Sweet syndrome        Conditions associated with erythema nodosum in case series*    [place]  (1954-68) [place]  (1959-69) [place]  (1973-82) [place]  (1960-95) [place]  (1982-92) [place]  (1984-90) Ann  (1988-97) [facility]  (1994-97)   Idiopathic/unknown 17% 28% 32% 55% 72% 35% 34% 60%   Sarcoidosis 14% 24% 1 patient 11% 0% 28% 22% 0%   Streptococcal infection 48% 14% 44% 28% 6% 6% 7% 9%   TB 20% 1 patient 1 patient 1 patient 12% 1.5% 5% 3%   Infections other than TB or strep¶ 1 patient 9% 0% 2.5% 0% 11% 22% 21%   Pregnancy or OCAs 0% 0% 10% 3% 0%? 10% 1 patient 4%   Drugs other than OCAs 0% 6% 6% 0% 7% 4% 2% 0%   Inflammatory bowel disease 0% 16% 1 patient 1.5% 0% 0% 3% 0%   Behcet syndrome 0% 0% 0% 0% 3% 4% 2% 3%   Other? 1 patient 7% 1 patient 0% 0% 1 patient 3% 0%       He does have a positive MARY and leukopenia and thrombocytopenia with no other findings of systemic lupus  He has no symptoms of sarcoidosis and also has had a normal CXR    Considered lupus panniculitis on the differential in the setting of MARY positivity and gave him a trial of plaquenil 200 mg bid to see if that help    But have requested  to re evaluate and give us her valuable recommendations     Cuate was seen today for disease management.    Diagnoses and all orders for this visit:    Panniculitis  -     Angiotensin Converting Enzyme; Future  -     INTERLEUKIN-2 RECEPTOR; Future  -     Sjogrens syndrome-A extractable nuclear antibody; Future  -     C3 complement; Future  -     C4 complement; Future    Erythema nodosum  -     Ambulatory referral/consult to Rheumatology  -     Angiotensin Converting Enzyme; Future  -     INTERLEUKIN-2 RECEPTOR; Future  -     Sjogrens syndrome-A extractable nuclear antibody; Future  -     C3 complement; Future  -     C4 complement; Future  -     Sedimentation rate; Future  -     C-Reactive Protein; Standing  -     C-Reactive Protein; Future    Positive MARY (antinuclear antibody)  -     Ambulatory  referral/consult to Rheumatology  -     Angiotensin Converting Enzyme; Future  -     INTERLEUKIN-2 RECEPTOR; Future  -     Sjogrens syndrome-A extractable nuclear antibody; Future  -     C3 complement; Future  -     C4 complement; Future    Other orders  -     hydrOXYchloroQUINE (PLAQUENIL) 200 mg tablet; Take 1 tablet (200 mg total) by mouth 2 (two) times daily.

## 2020-09-17 NOTE — PROGRESS NOTES
Care Everywhere: updated  Immunization: updated  Health Maintenance: updated  Media Review:   Legacy Review:   Order placed:   Upcoming appts: colonoscopy 9/23

## 2020-09-17 NOTE — LETTER
September 17, 2020      Carolyne Beth MD  [address]  [facility] [address]           [id number]  [address]  [facility] [address]  Phone: [phone]  Fax: [phone]          Patient: Cuate Fair   MR Number: [id number]   YOB: 1955   Date of Visit: 9/17/2020       Dear Dr. Carolyne Beth:    Thank you for referring Cuate Fair to me for evaluation. Attached you will find relevant portions of my assessment and plan of care.    If you have questions, please do not hesitate to call me. I look forward to following Cuate Fair along with you.    Sincerely,    Juarez Jordan MD    Enclosure  CC:  No Recipients    If you would like to receive this communication electronically, please contact [email or web address] or [phone] to request more information on [employer] access.    For providers and/or their staff who would like to refer a patient to [facility], please contact us through our one-stop-shop provider referral line, [facility], at [phone].    If you feel you have received this communication in error or would no longer like to receive these types of communications, please e-mail [email or web address]

## 2020-09-18 ENCOUNTER — IMMUNIZATION (OUTPATIENT)
Dept: PHARMACY | Facility: CLINIC | Age: 65
End: 2020-09-18
Payer: COMMERCIAL

## 2020-09-19 LAB — ACE SERPL-CCNC: 39 U/L (ref 16–85)

## 2020-09-21 LAB — SOL IL2 RECEP SERPL-MCNC: 204.3 PG/ML (ref 175.3–858.2)

## 2020-09-23 LAB
ANTI-SSA ANTIBODY: 0.06 RATIO (ref 0–0.99)
ANTI-SSA INTERPRETATION: NEGATIVE

## 2020-10-26 RX ORDER — SILDENAFIL 50 MG/1
50 TABLET, FILM COATED ORAL DAILY PRN
Qty: 30 TABLET | Refills: 0 | Status: SHIPPED | OUTPATIENT
Start: 2020-10-26 | End: 2020-11-05 | Stop reason: SDUPTHER

## 2020-10-26 NOTE — TELEPHONE ENCOUNTER
Care Due:                  Date            Visit Type   Department     Provider  --------------------------------------------------------------------------------                                ESTABLISHED   [facility] INTERNAL  Last Visit: 09-      PATIENT      MEDICINE       Carolyneleanne Chen Ignacia                                           [facility] INTERNAL  Next Visit: 12-      None         MEDICINE       Carolyne Beth                                                            Last  Test          Frequency    Reason                     Performed    Due Date  --------------------------------------------------------------------------------    ALT.........  12 months..  atorvastatin.............  12- 11-    AST.........  12 months..  atorvastatin.............  12- 11-    Cr..........  12 months..  losartan.................  12- 11-    HDL.........  12 months..  atorvastatin.............  12- 11-    K...........  12 months..  losartan.................  12- 11-    LDL.........  12 months..  atorvastatin.............  12- 11-    Total         12 months..  atorvastatin.............  12- 11-  Cholesterol.    Triglyceride  12 months..  atorvastatin.............  12- 11-  s...........    Powered by [employer]. Reference number: [id number]. 10/26/2020 11:50:03 AM   AMY

## 2020-11-05 RX ORDER — SILDENAFIL 50 MG/1
50 TABLET, FILM COATED ORAL DAILY PRN
Qty: 30 TABLET | Refills: 0 | Status: SHIPPED | OUTPATIENT
Start: 2020-11-05 | End: 2021-01-26 | Stop reason: SDUPTHER

## 2020-11-05 NOTE — TELEPHONE ENCOUNTER
No new care gaps identified.  Powered by [employer]. Reference number: [id number]. 11/05/2020 9:27:34 AM   CST

## 2020-11-05 NOTE — TELEPHONE ENCOUNTER
----- Message from Shannon Parra sent at 11/5/2020  9:20 AM CST -----  Regarding: Refill Request  Please refill the medication(s) listed below :Patient states pharmacy has been trying to reach office for a refill.    Medication # 1 :sildenafiL (VIAGRA) 50 MG tablet    Please call the patient when the prescription is sent to pharmacy :[phone]    Can the clinic reply in [facility] :No     Preferred Pharmacy : [id number] - [address] [facility] [address] [phone] (Phone)  [phone] (Fax)

## 2020-11-06 ENCOUNTER — TELEPHONE (OUTPATIENT)
Dept: INTERNAL MEDICINE | Facility: CLINIC | Age: 65
End: 2020-11-06

## 2020-11-06 NOTE — TELEPHONE ENCOUNTER
Spoke with pt and let him know Rx was sent in.    ----- Message from Gosia Mendieta sent at 11/6/2020  1:58 PM CST -----  Regarding: Patient call back  Who called: WINIFRED FERNANDEZ [[id number]]    What is the request in detail: Patient is requesting a call back. He states he has been waiting to hear back from the staff. He did not states what it is regarding.   Please advise.    Can the clinic reply by [facility]? No    Best call back number: [phone]    Additional Information: N/A

## 2020-12-09 ENCOUNTER — OFFICE VISIT (OUTPATIENT)
Dept: INTERNAL MEDICINE | Facility: CLINIC | Age: 65
End: 2020-12-09
Attending: INTERNAL MEDICINE
Payer: COMMERCIAL

## 2020-12-09 VITALS
WEIGHT: 167.75 LBS | HEIGHT: 68 IN | BODY MASS INDEX: 25.42 KG/M2 | SYSTOLIC BLOOD PRESSURE: 121 MMHG | DIASTOLIC BLOOD PRESSURE: 72 MMHG | HEART RATE: 81 BPM | OXYGEN SATURATION: 98 %

## 2020-12-09 DIAGNOSIS — E78.00 HYPERCHOLESTEROLEMIA: ICD-10-CM

## 2020-12-09 DIAGNOSIS — I10 ESSENTIAL HYPERTENSION: ICD-10-CM

## 2020-12-09 DIAGNOSIS — Z00.00 ANNUAL PHYSICAL EXAM: ICD-10-CM

## 2020-12-09 DIAGNOSIS — I10 HTN (HYPERTENSION), BENIGN: ICD-10-CM

## 2020-12-09 DIAGNOSIS — L52 ERYTHEMA NODOSUM: Primary | ICD-10-CM

## 2020-12-09 DIAGNOSIS — H53.9 VISION CHANGES: ICD-10-CM

## 2020-12-09 DIAGNOSIS — E78.5 HYPERLIPIDEMIA, UNSPECIFIED HYPERLIPIDEMIA TYPE: ICD-10-CM

## 2020-12-09 DIAGNOSIS — Z12.5 SCREENING FOR PROSTATE CANCER: ICD-10-CM

## 2020-12-09 PROCEDURE — 3008F PR BODY MASS INDEX (BMI) DOCUMENTED: ICD-10-PCS | Mod: CPTII,S$GLB,, | Performed by: INTERNAL MEDICINE

## 2020-12-09 PROCEDURE — 3074F PR MOST RECENT SYSTOLIC BLOOD PRESSURE < 130 MM HG: ICD-10-PCS | Mod: CPTII,S$GLB,, | Performed by: INTERNAL MEDICINE

## 2020-12-09 PROCEDURE — 3078F DIAST BP <80 MM HG: CPT | Mod: CPTII,S$GLB,, | Performed by: INTERNAL MEDICINE

## 2020-12-09 PROCEDURE — 3008F BODY MASS INDEX DOCD: CPT | Mod: CPTII,S$GLB,, | Performed by: INTERNAL MEDICINE

## 2020-12-09 PROCEDURE — 99214 OFFICE O/P EST MOD 30 MIN: CPT | Mod: S$GLB,,, | Performed by: INTERNAL MEDICINE

## 2020-12-09 PROCEDURE — 3078F PR MOST RECENT DIASTOLIC BLOOD PRESSURE < 80 MM HG: ICD-10-PCS | Mod: CPTII,S$GLB,, | Performed by: INTERNAL MEDICINE

## 2020-12-09 PROCEDURE — 99999 PR PBB SHADOW E&M-EST. PATIENT-LVL IV: ICD-10-PCS | Mod: PBBFAC,,, | Performed by: INTERNAL MEDICINE

## 2020-12-09 PROCEDURE — 99214 PR OFFICE/OUTPT VISIT, EST, LEVL IV, 30-39 MIN: ICD-10-PCS | Mod: S$GLB,,, | Performed by: INTERNAL MEDICINE

## 2020-12-09 PROCEDURE — 3074F SYST BP LT 130 MM HG: CPT | Mod: CPTII,S$GLB,, | Performed by: INTERNAL MEDICINE

## 2020-12-09 PROCEDURE — 99999 PR PBB SHADOW E&M-EST. PATIENT-LVL IV: CPT | Mod: PBBFAC,,, | Performed by: INTERNAL MEDICINE

## 2020-12-09 RX ORDER — AMLODIPINE BESYLATE 5 MG/1
5 TABLET ORAL DAILY
Qty: 90 TABLET | Refills: 3 | Status: SHIPPED | OUTPATIENT
Start: 2020-12-09 | End: 2021-12-15 | Stop reason: SDUPTHER

## 2020-12-09 RX ORDER — LOSARTAN POTASSIUM 50 MG/1
50 TABLET ORAL DAILY
Qty: 90 TABLET | Refills: 3 | Status: SHIPPED | OUTPATIENT
Start: 2020-12-09 | End: 2021-12-21 | Stop reason: SDUPTHER

## 2020-12-09 RX ORDER — HYDROXYCHLOROQUINE SULFATE 200 MG/1
200 TABLET, FILM COATED ORAL 2 TIMES DAILY
COMMUNITY
Start: 2020-11-19 | End: 2021-04-06

## 2020-12-09 RX ORDER — ATORVASTATIN CALCIUM 40 MG/1
40 TABLET, FILM COATED ORAL DAILY
Qty: 90 TABLET | Refills: 3 | Status: SHIPPED | OUTPATIENT
Start: 2020-12-09 | End: 2021-12-27 | Stop reason: SDUPTHER

## 2020-12-09 NOTE — PROGRESS NOTES
"Subjective:   Patient ID: Cuate Fair is a 65 y.o. male  Chief complaint:   Chief Complaint   Patient presents with    Lupus     saw md for this; f/u       HPI    Here for follow up exam     Seen by rheum for + MARY and hx of EN dx via biopsy   - she was c/a sclerosing panniculitis? With rec to f/u with derm for further evalauation   - he was given plaquenil and sx improved    EN: dx by biopsy  - quant gold negative  - cxr wnl   TSH wnl    - denies sore throat    - MARY positive 1:160 homogenous with neg reflex   - pt denies joint pain, rashes, morning stiffness      - does have hx of hep b that he was prev told that his body cleared   - hep b core ab +, hep b surf ab +  - hep b surf antigen neg and hep b e ant neg  - HIV neg    - seen by h/o for thrombocytopenia and neutropenia prior to this dx - spep and immunifix wnl  immunoglob wnl    - psa wnl    No diarrhea, sore throat, fevers, joint swelling or pain or inc warmth or redness   - no EN lesions on bilateral legs at this time     Wife has lupus - skin     HTN:   Reports checking intermittently   - well controlled today  - taking bp meds in am - amlodipine 5mg   - losartan stopped and LH resolved      Review of Systems      Objective:  Vitals:    12/09/20 1424   BP: 121/72   BP Location: Left arm   Patient Position: Sitting   Pulse: 81   SpO2: 98%   Weight: 76.1 kg (167 lb 12.3 oz)   Height: 5' 8" (1.727 m)     Body mass index is 25.51 kg/m².    Physical Exam  Vitals signs reviewed.   Constitutional:       Appearance: Normal appearance. He is well-developed.   HENT:      Head: Normocephalic and atraumatic.      Nose:      Comments: Wearing mask   Eyes:      Extraocular Movements: Extraocular movements intact.      Conjunctiva/sclera: Conjunctivae normal.   Neck:      Musculoskeletal: Neck supple.      Thyroid: No thyromegaly.   Cardiovascular:      Rate and Rhythm: Normal rate and regular rhythm.      Pulses: Normal pulses.      Heart sounds: Normal " heart sounds.   Pulmonary:      Effort: Pulmonary effort is normal.      Breath sounds: Normal breath sounds.   Abdominal:      General: Bowel sounds are normal.      Palpations: Abdomen is soft.   Musculoskeletal:         General: No swelling or tenderness.   Lymphadenopathy:      Cervical: No cervical adenopathy.   Skin:     General: Skin is warm and dry.      Capillary Refill: Capillary refill takes less than 2 seconds.   Neurological:      General: No focal deficit present.      Mental Status: He is alert and oriented to person, place, and time.   Psychiatric:         Mood and Affect: Mood normal.         Behavior: Behavior normal.         Thought Content: Thought content normal.         Judgment: Judgment normal.         Assessment:  1. Erythema nodosum    2. Annual physical exam    3. Screening for prostate cancer    4. Vision changes    5. Essential hypertension    6. HTN (hypertension), benign    7. Hypercholesterolemia    8. Hyperlipidemia, unspecified hyperlipidemia type        Plan:  Schedule with derm for soonest apt   F/uw with rheum   Clinically impoved with rx   Schedule eye appt   Schedule annual labs include psa and ua  erquest cscope repot from [facility]   rtc in 3 months or sooner prn     Health Maintenance   Topic Date Due    Pneumococcal Vaccine (Highest Risk) (1 of 3 - PCV13) 05/14/1976    Lipid Panel  12/04/2020    TETANUS VACCINE  11/09/2028    Hepatitis C Screening  Completed

## 2020-12-15 ENCOUNTER — OFFICE VISIT (OUTPATIENT)
Dept: DERMATOLOGY | Facility: CLINIC | Age: 65
End: 2020-12-15
Payer: COMMERCIAL

## 2020-12-15 ENCOUNTER — LAB VISIT (OUTPATIENT)
Dept: LAB | Facility: HOSPITAL | Age: 65
End: 2020-12-15
Attending: INTERNAL MEDICINE
Payer: COMMERCIAL

## 2020-12-15 DIAGNOSIS — Z00.00 ANNUAL PHYSICAL EXAM: ICD-10-CM

## 2020-12-15 DIAGNOSIS — L52 ERYTHEMA NODOSUM: Primary | ICD-10-CM

## 2020-12-15 LAB
ALBUMIN SERPL BCP-MCNC: 4.3 G/DL (ref 3.5–5.2)
ALP SERPL-CCNC: 70 U/L (ref 55–135)
ALT SERPL W/O P-5'-P-CCNC: 24 U/L (ref 10–44)
ANION GAP SERPL CALC-SCNC: 6 MMOL/L (ref 8–16)
AST SERPL-CCNC: 22 U/L (ref 10–40)
BASOPHILS # BLD AUTO: 0.03 K/UL (ref 0–0.2)
BASOPHILS NFR BLD: 0.9 % (ref 0–1.9)
BILIRUB SERPL-MCNC: 0.8 MG/DL (ref 0.1–1)
BUN SERPL-MCNC: 19 MG/DL (ref 8–23)
CALCIUM SERPL-MCNC: 9.2 MG/DL (ref 8.7–10.5)
CHLORIDE SERPL-SCNC: 102 MMOL/L (ref 95–110)
CHOLEST SERPL-MCNC: 156 MG/DL (ref 120–199)
CHOLEST/HDLC SERPL: 3.1 {RATIO} (ref 2–5)
CO2 SERPL-SCNC: 32 MMOL/L (ref 23–29)
CREAT SERPL-MCNC: 1.3 MG/DL (ref 0.5–1.4)
DIFFERENTIAL METHOD: ABNORMAL
EOSINOPHIL # BLD AUTO: 0.2 K/UL (ref 0–0.5)
EOSINOPHIL NFR BLD: 4.3 % (ref 0–8)
ERYTHROCYTE [DISTWIDTH] IN BLOOD BY AUTOMATED COUNT: 12.8 % (ref 11.5–14.5)
EST. GFR  (AFRICAN AMERICAN): >60 ML/MIN/1.73 M^2
EST. GFR  (NON AFRICAN AMERICAN): 58.1 ML/MIN/1.73 M^2
GLUCOSE SERPL-MCNC: 85 MG/DL (ref 70–110)
HCT VFR BLD AUTO: 50.2 % (ref 40–54)
HDLC SERPL-MCNC: 51 MG/DL (ref 40–75)
HDLC SERPL: 32.7 % (ref 20–50)
HGB BLD-MCNC: 16.7 G/DL (ref 14–18)
IMM GRANULOCYTES # BLD AUTO: 0.01 K/UL (ref 0–0.04)
IMM GRANULOCYTES NFR BLD AUTO: 0.3 % (ref 0–0.5)
LDLC SERPL CALC-MCNC: 91.6 MG/DL (ref 63–159)
LYMPHOCYTES # BLD AUTO: 1.4 K/UL (ref 1–4.8)
LYMPHOCYTES NFR BLD: 40.2 % (ref 18–48)
MCH RBC QN AUTO: 29.2 PG (ref 27–31)
MCHC RBC AUTO-ENTMCNC: 33.3 G/DL (ref 32–36)
MCV RBC AUTO: 88 FL (ref 82–98)
MONOCYTES # BLD AUTO: 0.4 K/UL (ref 0.3–1)
MONOCYTES NFR BLD: 12.1 % (ref 4–15)
NEUTROPHILS # BLD AUTO: 1.5 K/UL (ref 1.8–7.7)
NEUTROPHILS NFR BLD: 42.2 % (ref 38–73)
NONHDLC SERPL-MCNC: 105 MG/DL
NRBC BLD-RTO: 0 /100 WBC
PLATELET # BLD AUTO: 147 K/UL (ref 150–350)
PMV BLD AUTO: 10.6 FL (ref 9.2–12.9)
POTASSIUM SERPL-SCNC: 4.2 MMOL/L (ref 3.5–5.1)
PROT SERPL-MCNC: 7.5 G/DL (ref 6–8.4)
RBC # BLD AUTO: 5.72 M/UL (ref 4.6–6.2)
SODIUM SERPL-SCNC: 140 MMOL/L (ref 136–145)
TRIGL SERPL-MCNC: 67 MG/DL (ref 30–150)
TSH SERPL DL<=0.005 MIU/L-ACNC: 1.28 UIU/ML (ref 0.4–4)
WBC # BLD AUTO: 3.46 K/UL (ref 3.9–12.7)

## 2020-12-15 PROCEDURE — 1126F AMNT PAIN NOTED NONE PRSNT: CPT | Mod: S$GLB,,, | Performed by: DERMATOLOGY

## 2020-12-15 PROCEDURE — 99999 PR PBB SHADOW E&M-EST. PATIENT-LVL III: CPT | Mod: PBBFAC,,, | Performed by: DERMATOLOGY

## 2020-12-15 PROCEDURE — 85025 COMPLETE CBC W/AUTO DIFF WBC: CPT

## 2020-12-15 PROCEDURE — 80061 LIPID PANEL: CPT

## 2020-12-15 PROCEDURE — 99213 PR OFFICE/OUTPT VISIT, EST, LEVL III, 20-29 MIN: ICD-10-PCS | Mod: S$GLB,,, | Performed by: DERMATOLOGY

## 2020-12-15 PROCEDURE — 99999 PR PBB SHADOW E&M-EST. PATIENT-LVL III: ICD-10-PCS | Mod: PBBFAC,,, | Performed by: DERMATOLOGY

## 2020-12-15 PROCEDURE — 80053 COMPREHEN METABOLIC PANEL: CPT

## 2020-12-15 PROCEDURE — 84443 ASSAY THYROID STIM HORMONE: CPT

## 2020-12-15 PROCEDURE — 1126F PR PAIN SEVERITY QUANTIFIED, NO PAIN PRESENT: ICD-10-PCS | Mod: S$GLB,,, | Performed by: DERMATOLOGY

## 2020-12-15 PROCEDURE — 99213 OFFICE O/P EST LOW 20 MIN: CPT | Mod: S$GLB,,, | Performed by: DERMATOLOGY

## 2020-12-15 PROCEDURE — 36415 COLL VENOUS BLD VENIPUNCTURE: CPT

## 2020-12-15 NOTE — PROGRESS NOTES
Subjective:       Patient ID:  Cuate Fair is a 65 y.o. male who presents for   Chief Complaint   Patient presents with    Rash     Last seen 03/03/2020 for bx of 1 year h/o tender nodules on LE's. No new lesions.Not following RICE-N as suggested    Bx:  Skin, right lower medial leg, punch biopsy:  -ERYTHEMA NODOSUM, consistent with  Last EN lesion over 2 months ago    Started on plaquenil 200mg bid (2 months ago) by Dr. VALENTIN (rheum)    MARY Positive 1: 160,profile neg  Lab Results      Component                Value               Date                      WBC                      3.46 (L)            12/15/2020                HGB                      16.7                12/15/2020                HCT                      50.2                12/15/2020                MCV                      88                  12/15/2020                PLT                      147 (L)             12/15/2020                      Review of Systems   Constitutional: Negative for fever, fatigue and malaise.   HENT: Negative for mouth sores.    Respiratory: Negative for shortness of breath.    Cardiovascular: Negative for pedal edema.   Musculoskeletal: Negative for myalgias, joint swelling, arthralgias and muscle weakness.   Skin: Negative for itching, rash, sun sensitivity and daily sunscreen use.   Neurological: Negative for seizures.        Objective:    Physical Exam   Constitutional: He appears well-developed and well-nourished. No distress.   Neurological: He is alert and oriented to person, place, and time. He is not disoriented.   Psychiatric: He has a normal mood and affect.   Skin:   Areas Examined (abnormalities noted in diagram):   RLE Inspected  LLE Inspection Performed              Diagram Legend     Erythematous scaling macule/papule c/w actinic keratosis       Vascular papule c/w angioma      Pigmented verrucoid papule/plaque c/w seborrheic keratosis      Yellow umbilicated papule c/w sebaceous hyperplasia       Irregularly shaped tan macule c/w lentigo     1-2 mm smooth white papules consistent with Milia      Movable subcutaneous cyst with punctum c/w epidermal inclusion cyst      Subcutaneous movable cyst c/w pilar cyst      Firm pink to brown papule c/w dermatofibroma      Pedunculated fleshy papule(s) c/w skin tag(s)      Evenly pigmented macule c/w junctional nevus     Mildly variegated pigmented, slightly irregular-bordered macule c/w mildly atypical nevus      Flesh colored to evenly pigmented papule c/w intradermal nevus       Pink pearly papule/plaque c/w basal cell carcinoma      Erythematous hyperkeratotic cursted plaque c/w SCC      Surgical scar with no sign of skin cancer recurrence      Open and closed comedones      Inflammatory papules and pustules      Verrucoid papule consistent consistent with wart     Erythematous eczematous patches and plaques     Dystrophic onycholytic nail with subungual debris c/w onychomycosis     Umbilicated papule    Erythematous-base heme-crusted tan verrucoid plaque consistent with inflamed seborrheic keratosis     Erythematous Silvery Scaling Plaque c/w Psoriasis     See annotation      Assessment / Plan:        Erythema nodosum  ANTONETTE  No new lesion in past couple months since starting plaquenil (per rheum) at 200mg bid  Rec cont plaquenil at 200mg bid x 4 more months, if still lesion free, would decrease dose to 200mg qday.              Follow up if symptoms worsen or fail to improve.

## 2020-12-15 NOTE — Clinical Note
Evaluated pt today. No new erythema nodosum lesions since starting plaquenil. No sign of skin fibrosis or lipodermatosclerosus. Rec cont plaquenil at 200mg bid. If he continues to be EN-free x 6 months, consider decreasing dose to 200mg qday. JAM

## 2020-12-16 ENCOUNTER — PATIENT OUTREACH (OUTPATIENT)
Dept: ADMINISTRATIVE | Facility: OTHER | Age: 65
End: 2020-12-16

## 2020-12-16 ENCOUNTER — TELEPHONE (OUTPATIENT)
Dept: INTERNAL MEDICINE | Facility: CLINIC | Age: 65
End: 2020-12-16

## 2020-12-16 NOTE — TELEPHONE ENCOUNTER
Please notify pt that his urine lab is normal and other blood tests are stable and in good range - recommend continue his current medications

## 2020-12-17 NOTE — PROGRESS NOTES
Care Everywhere: updated  Immunization: updated   Health Maintenance: updated  Media Review: review for outside colon cancer report   Legacy Review:   Order placed:   Upcoming appts:  9.9.2020 colonoscopy case request

## 2021-01-26 ENCOUNTER — OFFICE VISIT (OUTPATIENT)
Dept: INTERNAL MEDICINE | Facility: CLINIC | Age: 66
End: 2021-01-26
Payer: COMMERCIAL

## 2021-01-26 VITALS
SYSTOLIC BLOOD PRESSURE: 120 MMHG | WEIGHT: 169.75 LBS | DIASTOLIC BLOOD PRESSURE: 78 MMHG | HEART RATE: 68 BPM | BODY MASS INDEX: 25.73 KG/M2 | HEIGHT: 68 IN

## 2021-01-26 DIAGNOSIS — L30.9 DERMATITIS: Primary | ICD-10-CM

## 2021-01-26 PROCEDURE — 3074F SYST BP LT 130 MM HG: CPT | Mod: CPTII,S$GLB,, | Performed by: PHYSICIAN ASSISTANT

## 2021-01-26 PROCEDURE — 3078F DIAST BP <80 MM HG: CPT | Mod: CPTII,S$GLB,, | Performed by: PHYSICIAN ASSISTANT

## 2021-01-26 PROCEDURE — 99999 PR PBB SHADOW E&M-EST. PATIENT-LVL III: CPT | Mod: PBBFAC,,, | Performed by: PHYSICIAN ASSISTANT

## 2021-01-26 PROCEDURE — 3078F PR MOST RECENT DIASTOLIC BLOOD PRESSURE < 80 MM HG: ICD-10-PCS | Mod: CPTII,S$GLB,, | Performed by: PHYSICIAN ASSISTANT

## 2021-01-26 PROCEDURE — 3008F BODY MASS INDEX DOCD: CPT | Mod: CPTII,S$GLB,, | Performed by: PHYSICIAN ASSISTANT

## 2021-01-26 PROCEDURE — 99212 PR OFFICE/OUTPT VISIT, EST, LEVL II, 10-19 MIN: ICD-10-PCS | Mod: S$GLB,,, | Performed by: PHYSICIAN ASSISTANT

## 2021-01-26 PROCEDURE — 3074F PR MOST RECENT SYSTOLIC BLOOD PRESSURE < 130 MM HG: ICD-10-PCS | Mod: CPTII,S$GLB,, | Performed by: PHYSICIAN ASSISTANT

## 2021-01-26 PROCEDURE — 3008F PR BODY MASS INDEX (BMI) DOCUMENTED: ICD-10-PCS | Mod: CPTII,S$GLB,, | Performed by: PHYSICIAN ASSISTANT

## 2021-01-26 PROCEDURE — 99212 OFFICE O/P EST SF 10 MIN: CPT | Mod: S$GLB,,, | Performed by: PHYSICIAN ASSISTANT

## 2021-01-26 PROCEDURE — 99999 PR PBB SHADOW E&M-EST. PATIENT-LVL III: ICD-10-PCS | Mod: PBBFAC,,, | Performed by: PHYSICIAN ASSISTANT

## 2021-01-26 RX ORDER — SILDENAFIL 50 MG/1
50 TABLET, FILM COATED ORAL DAILY PRN
Qty: 30 TABLET | Refills: 0 | Status: SHIPPED | OUTPATIENT
Start: 2021-01-26 | End: 2021-06-24 | Stop reason: SDUPTHER

## 2021-01-26 RX ORDER — HYDROCORTISONE 25 MG/G
CREAM TOPICAL 2 TIMES DAILY
Qty: 3.5 G | Refills: 0 | Status: SHIPPED | OUTPATIENT
Start: 2021-01-26 | End: 2021-07-08 | Stop reason: SDUPTHER

## 2021-01-27 ENCOUNTER — IMMUNIZATION (OUTPATIENT)
Dept: PHARMACY | Facility: CLINIC | Age: 66
End: 2021-01-27
Payer: COMMERCIAL

## 2021-02-03 ENCOUNTER — PATIENT OUTREACH (OUTPATIENT)
Dept: ADMINISTRATIVE | Facility: OTHER | Age: 66
End: 2021-02-03

## 2021-02-04 ENCOUNTER — OFFICE VISIT (OUTPATIENT)
Dept: OPTOMETRY | Facility: CLINIC | Age: 66
End: 2021-02-04
Attending: INTERNAL MEDICINE
Payer: COMMERCIAL

## 2021-02-04 DIAGNOSIS — H52.4 PRESBYOPIA: ICD-10-CM

## 2021-02-04 DIAGNOSIS — H40.013 OPEN ANGLE WITH BORDERLINE FINDINGS OF BOTH EYES: ICD-10-CM

## 2021-02-04 DIAGNOSIS — L52 ERYTHEMA NODOSUM: ICD-10-CM

## 2021-02-04 DIAGNOSIS — H53.9 VISION DISTURBANCE: ICD-10-CM

## 2021-02-04 DIAGNOSIS — Z79.899 ENCOUNTER FOR LONG-TERM (CURRENT) USE OF HIGH-RISK MEDICATION: Primary | ICD-10-CM

## 2021-02-04 DIAGNOSIS — H25.13 NUCLEAR SCLEROSIS OF BOTH EYES: ICD-10-CM

## 2021-02-04 PROCEDURE — 1126F PR PAIN SEVERITY QUANTIFIED, NO PAIN PRESENT: ICD-10-PCS | Mod: S$GLB,,, | Performed by: OPTOMETRIST

## 2021-02-04 PROCEDURE — 92015 PR REFRACTION: ICD-10-PCS | Mod: S$GLB,,, | Performed by: OPTOMETRIST

## 2021-02-04 PROCEDURE — 92004 PR EYE EXAM, NEW PATIENT,COMPREHESV: ICD-10-PCS | Mod: S$GLB,,, | Performed by: OPTOMETRIST

## 2021-02-04 PROCEDURE — 92083 EXTENDED VISUAL FIELD XM: CPT | Mod: S$GLB,,, | Performed by: OPTOMETRIST

## 2021-02-04 PROCEDURE — 92083 HUMPHREY VISUAL FIELD - OU - BOTH EYES: ICD-10-PCS | Mod: S$GLB,,, | Performed by: OPTOMETRIST

## 2021-02-04 PROCEDURE — 92134 CPTRZ OPH DX IMG PST SGM RTA: CPT | Mod: S$GLB,,, | Performed by: OPTOMETRIST

## 2021-02-04 PROCEDURE — 99999 PR PBB SHADOW E&M-EST. PATIENT-LVL III: ICD-10-PCS | Mod: PBBFAC,,, | Performed by: OPTOMETRIST

## 2021-02-04 PROCEDURE — 92004 COMPRE OPH EXAM NEW PT 1/>: CPT | Mod: S$GLB,,, | Performed by: OPTOMETRIST

## 2021-02-04 PROCEDURE — 99999 PR PBB SHADOW E&M-EST. PATIENT-LVL III: CPT | Mod: PBBFAC,,, | Performed by: OPTOMETRIST

## 2021-02-04 PROCEDURE — 1126F AMNT PAIN NOTED NONE PRSNT: CPT | Mod: S$GLB,,, | Performed by: OPTOMETRIST

## 2021-02-04 PROCEDURE — 92134 POSTERIOR SEGMENT OCT RETINA (OCULAR COHERENCE TOMOGRAPHY)-BOTH EYES: ICD-10-PCS | Mod: S$GLB,,, | Performed by: OPTOMETRIST

## 2021-02-04 PROCEDURE — 92015 DETERMINE REFRACTIVE STATE: CPT | Mod: S$GLB,,, | Performed by: OPTOMETRIST

## 2021-03-09 ENCOUNTER — OFFICE VISIT (OUTPATIENT)
Dept: INTERNAL MEDICINE | Facility: CLINIC | Age: 66
End: 2021-03-09
Attending: INTERNAL MEDICINE
Payer: COMMERCIAL

## 2021-03-09 VITALS
SYSTOLIC BLOOD PRESSURE: 122 MMHG | OXYGEN SATURATION: 98 % | WEIGHT: 167.13 LBS | HEART RATE: 64 BPM | BODY MASS INDEX: 25.33 KG/M2 | DIASTOLIC BLOOD PRESSURE: 80 MMHG | HEIGHT: 68 IN

## 2021-03-09 DIAGNOSIS — L52 ERYTHEMA NODOSUM: ICD-10-CM

## 2021-03-09 DIAGNOSIS — Z12.11 SCREENING FOR COLON CANCER: ICD-10-CM

## 2021-03-09 DIAGNOSIS — I10 ESSENTIAL HYPERTENSION: Primary | ICD-10-CM

## 2021-03-09 PROCEDURE — 99999 PR PBB SHADOW E&M-EST. PATIENT-LVL IV: ICD-10-PCS | Mod: PBBFAC,,, | Performed by: INTERNAL MEDICINE

## 2021-03-09 PROCEDURE — 99999 PR PBB SHADOW E&M-EST. PATIENT-LVL IV: CPT | Mod: PBBFAC,,, | Performed by: INTERNAL MEDICINE

## 2021-03-09 PROCEDURE — 3079F DIAST BP 80-89 MM HG: CPT | Mod: CPTII,S$GLB,, | Performed by: INTERNAL MEDICINE

## 2021-03-09 PROCEDURE — 3074F PR MOST RECENT SYSTOLIC BLOOD PRESSURE < 130 MM HG: ICD-10-PCS | Mod: CPTII,S$GLB,, | Performed by: INTERNAL MEDICINE

## 2021-03-09 PROCEDURE — 99214 OFFICE O/P EST MOD 30 MIN: CPT | Mod: S$GLB,,, | Performed by: INTERNAL MEDICINE

## 2021-03-09 PROCEDURE — 1126F AMNT PAIN NOTED NONE PRSNT: CPT | Mod: S$GLB,,, | Performed by: INTERNAL MEDICINE

## 2021-03-09 PROCEDURE — 1126F PR PAIN SEVERITY QUANTIFIED, NO PAIN PRESENT: ICD-10-PCS | Mod: S$GLB,,, | Performed by: INTERNAL MEDICINE

## 2021-03-09 PROCEDURE — 3074F SYST BP LT 130 MM HG: CPT | Mod: CPTII,S$GLB,, | Performed by: INTERNAL MEDICINE

## 2021-03-09 PROCEDURE — 3008F PR BODY MASS INDEX (BMI) DOCUMENTED: ICD-10-PCS | Mod: CPTII,S$GLB,, | Performed by: INTERNAL MEDICINE

## 2021-03-09 PROCEDURE — 3008F BODY MASS INDEX DOCD: CPT | Mod: CPTII,S$GLB,, | Performed by: INTERNAL MEDICINE

## 2021-03-09 PROCEDURE — 99214 PR OFFICE/OUTPT VISIT, EST, LEVL IV, 30-39 MIN: ICD-10-PCS | Mod: S$GLB,,, | Performed by: INTERNAL MEDICINE

## 2021-03-09 PROCEDURE — 3079F PR MOST RECENT DIASTOLIC BLOOD PRESSURE 80-89 MM HG: ICD-10-PCS | Mod: CPTII,S$GLB,, | Performed by: INTERNAL MEDICINE

## 2021-03-16 ENCOUNTER — PATIENT OUTREACH (OUTPATIENT)
Dept: ADMINISTRATIVE | Facility: OTHER | Age: 66
End: 2021-03-16

## 2021-03-17 ENCOUNTER — OFFICE VISIT (OUTPATIENT)
Dept: OPTOMETRY | Facility: CLINIC | Age: 66
End: 2021-03-17
Payer: COMMERCIAL

## 2021-03-17 DIAGNOSIS — H40.013 OPEN ANGLE WITH BORDERLINE FINDINGS OF BOTH EYES: Primary | ICD-10-CM

## 2021-03-17 PROCEDURE — 92133 POSTERIOR SEGMENT OCT OPTIC NERVE(OCULAR COHERENCE TOMOGRAPHY) - OU - BOTH EYES: ICD-10-PCS | Mod: S$GLB,,, | Performed by: OPTOMETRIST

## 2021-03-17 PROCEDURE — 92012 PR EYE EXAM, EST PATIENT,INTERMED: ICD-10-PCS | Mod: S$GLB,,, | Performed by: OPTOMETRIST

## 2021-03-17 PROCEDURE — 76514 PR  US, EYE, FOR CORNEAL THICKNESS: ICD-10-PCS | Mod: S$GLB,,, | Performed by: OPTOMETRIST

## 2021-03-17 PROCEDURE — 99999 PR PBB SHADOW E&M-EST. PATIENT-LVL II: CPT | Mod: PBBFAC,,, | Performed by: OPTOMETRIST

## 2021-03-17 PROCEDURE — 1126F PR PAIN SEVERITY QUANTIFIED, NO PAIN PRESENT: ICD-10-PCS | Mod: S$GLB,,, | Performed by: OPTOMETRIST

## 2021-03-17 PROCEDURE — 1126F AMNT PAIN NOTED NONE PRSNT: CPT | Mod: S$GLB,,, | Performed by: OPTOMETRIST

## 2021-03-17 PROCEDURE — 92012 INTRM OPH EXAM EST PATIENT: CPT | Mod: S$GLB,,, | Performed by: OPTOMETRIST

## 2021-03-17 PROCEDURE — 99999 PR PBB SHADOW E&M-EST. PATIENT-LVL II: ICD-10-PCS | Mod: PBBFAC,,, | Performed by: OPTOMETRIST

## 2021-03-17 PROCEDURE — 92083 HUMPHREY VISUAL FIELD - OU - BOTH EYES: ICD-10-PCS | Mod: S$GLB,,, | Performed by: OPTOMETRIST

## 2021-03-17 PROCEDURE — 92083 EXTENDED VISUAL FIELD XM: CPT | Mod: S$GLB,,, | Performed by: OPTOMETRIST

## 2021-03-17 PROCEDURE — 76514 ECHO EXAM OF EYE THICKNESS: CPT | Mod: S$GLB,,, | Performed by: OPTOMETRIST

## 2021-03-17 PROCEDURE — 92133 CPTRZD OPH DX IMG PST SGM ON: CPT | Mod: S$GLB,,, | Performed by: OPTOMETRIST

## 2021-04-09 ENCOUNTER — LAB VISIT (OUTPATIENT)
Dept: LAB | Facility: OTHER | Age: 66
End: 2021-04-09
Attending: INTERNAL MEDICINE
Payer: COMMERCIAL

## 2021-04-09 DIAGNOSIS — Z12.5 SCREENING FOR PROSTATE CANCER: ICD-10-CM

## 2021-04-09 LAB — COMPLEXED PSA SERPL-MCNC: 1.5 NG/ML (ref 0–4)

## 2021-04-09 PROCEDURE — 36415 COLL VENOUS BLD VENIPUNCTURE: CPT | Performed by: INTERNAL MEDICINE

## 2021-04-09 PROCEDURE — 84153 ASSAY OF PSA TOTAL: CPT | Performed by: INTERNAL MEDICINE

## 2021-04-12 ENCOUNTER — TELEPHONE (OUTPATIENT)
Dept: INTERNAL MEDICINE | Facility: CLINIC | Age: 66
End: 2021-04-12

## 2021-04-12 NOTE — TELEPHONE ENCOUNTER
----- Message from Tavia Joseph sent at 4/12/2021 11:39 AM CDT -----  Regarding: returning call  Contact: pt [phone]  Pt is returning miss call from destin Cervantes. Please contact pt

## 2021-06-08 ENCOUNTER — PATIENT OUTREACH (OUTPATIENT)
Dept: ADMINISTRATIVE | Facility: HOSPITAL | Age: 66
End: 2021-06-08

## 2021-06-24 DIAGNOSIS — N52.9 ERECTILE DYSFUNCTION, UNSPECIFIED ERECTILE DYSFUNCTION TYPE: Primary | ICD-10-CM

## 2021-06-24 RX ORDER — SILDENAFIL 50 MG/1
50 TABLET, FILM COATED ORAL DAILY PRN
Qty: 30 TABLET | Refills: 0 | Status: SHIPPED | OUTPATIENT
Start: 2021-06-24 | End: 2021-07-08 | Stop reason: SDUPTHER

## 2021-07-08 ENCOUNTER — OFFICE VISIT (OUTPATIENT)
Dept: INTERNAL MEDICINE | Facility: CLINIC | Age: 66
End: 2021-07-08
Payer: COMMERCIAL

## 2021-07-08 VITALS
OXYGEN SATURATION: 96 % | HEART RATE: 81 BPM | HEIGHT: 68 IN | WEIGHT: 170.63 LBS | DIASTOLIC BLOOD PRESSURE: 79 MMHG | SYSTOLIC BLOOD PRESSURE: 124 MMHG | BODY MASS INDEX: 25.86 KG/M2

## 2021-07-08 DIAGNOSIS — M25.561 RIGHT KNEE PAIN, UNSPECIFIED CHRONICITY: Primary | ICD-10-CM

## 2021-07-08 DIAGNOSIS — L98.9 SKIN LESION OF HAND: ICD-10-CM

## 2021-07-08 DIAGNOSIS — N52.9 ERECTILE DYSFUNCTION, UNSPECIFIED ERECTILE DYSFUNCTION TYPE: ICD-10-CM

## 2021-07-08 PROCEDURE — 3074F PR MOST RECENT SYSTOLIC BLOOD PRESSURE < 130 MM HG: ICD-10-PCS | Mod: CPTII,S$GLB,, | Performed by: PHYSICIAN ASSISTANT

## 2021-07-08 PROCEDURE — 3008F PR BODY MASS INDEX (BMI) DOCUMENTED: ICD-10-PCS | Mod: CPTII,S$GLB,, | Performed by: PHYSICIAN ASSISTANT

## 2021-07-08 PROCEDURE — 3078F DIAST BP <80 MM HG: CPT | Mod: CPTII,S$GLB,, | Performed by: PHYSICIAN ASSISTANT

## 2021-07-08 PROCEDURE — 1125F AMNT PAIN NOTED PAIN PRSNT: CPT | Mod: CPTII,S$GLB,, | Performed by: PHYSICIAN ASSISTANT

## 2021-07-08 PROCEDURE — 99999 PR PBB SHADOW E&M-EST. PATIENT-LVL III: ICD-10-PCS | Mod: PBBFAC,,, | Performed by: PHYSICIAN ASSISTANT

## 2021-07-08 PROCEDURE — 99213 OFFICE O/P EST LOW 20 MIN: CPT | Mod: S$GLB,,, | Performed by: PHYSICIAN ASSISTANT

## 2021-07-08 PROCEDURE — 99999 PR PBB SHADOW E&M-EST. PATIENT-LVL III: CPT | Mod: PBBFAC,,, | Performed by: PHYSICIAN ASSISTANT

## 2021-07-08 PROCEDURE — 3074F SYST BP LT 130 MM HG: CPT | Mod: CPTII,S$GLB,, | Performed by: PHYSICIAN ASSISTANT

## 2021-07-08 PROCEDURE — 1125F PR PAIN SEVERITY QUANTIFIED, PAIN PRESENT: ICD-10-PCS | Mod: CPTII,S$GLB,, | Performed by: PHYSICIAN ASSISTANT

## 2021-07-08 PROCEDURE — 3078F PR MOST RECENT DIASTOLIC BLOOD PRESSURE < 80 MM HG: ICD-10-PCS | Mod: CPTII,S$GLB,, | Performed by: PHYSICIAN ASSISTANT

## 2021-07-08 PROCEDURE — 3008F BODY MASS INDEX DOCD: CPT | Mod: CPTII,S$GLB,, | Performed by: PHYSICIAN ASSISTANT

## 2021-07-08 PROCEDURE — 99213 PR OFFICE/OUTPT VISIT, EST, LEVL III, 20-29 MIN: ICD-10-PCS | Mod: S$GLB,,, | Performed by: PHYSICIAN ASSISTANT

## 2021-07-08 RX ORDER — HYDROCORTISONE 25 MG/G
CREAM TOPICAL 2 TIMES DAILY
Qty: 3.5 G | Refills: 0 | Status: SHIPPED | OUTPATIENT
Start: 2021-07-08

## 2021-07-08 RX ORDER — SILDENAFIL 50 MG/1
50 TABLET, FILM COATED ORAL DAILY PRN
Qty: 30 TABLET | Refills: 0 | Status: SHIPPED | OUTPATIENT
Start: 2021-07-08 | End: 2022-07-07 | Stop reason: SDUPTHER

## 2021-08-13 ENCOUNTER — PATIENT OUTREACH (OUTPATIENT)
Dept: ADMINISTRATIVE | Facility: OTHER | Age: 66
End: 2021-08-13

## 2021-09-03 ENCOUNTER — TELEPHONE (OUTPATIENT)
Dept: INTERNAL MEDICINE | Facility: CLINIC | Age: 66
End: 2021-09-03

## 2021-09-24 ENCOUNTER — TELEPHONE (OUTPATIENT)
Dept: RHEUMATOLOGY | Facility: CLINIC | Age: 66
End: 2021-09-24

## 2021-09-28 RX ORDER — HYDROXYCHLOROQUINE SULFATE 200 MG/1
TABLET, FILM COATED ORAL
Qty: 60 TABLET | Refills: 0 | Status: SHIPPED | OUTPATIENT
Start: 2021-09-28 | End: 2021-11-09

## 2021-12-13 DIAGNOSIS — I10 HTN (HYPERTENSION), BENIGN: ICD-10-CM

## 2021-12-13 DIAGNOSIS — E78.00 HYPERCHOLESTEROLEMIA: ICD-10-CM

## 2021-12-16 RX ORDER — AMLODIPINE BESYLATE 5 MG/1
5 TABLET ORAL DAILY
Qty: 90 TABLET | Refills: 3 | OUTPATIENT
Start: 2021-12-16

## 2021-12-21 DIAGNOSIS — I10 ESSENTIAL HYPERTENSION: ICD-10-CM

## 2021-12-21 RX ORDER — LOSARTAN POTASSIUM 50 MG/1
50 TABLET ORAL DAILY
Qty: 90 TABLET | Refills: 0 | Status: SHIPPED | OUTPATIENT
Start: 2021-12-21 | End: 2022-03-23

## 2021-12-27 DIAGNOSIS — E78.5 HYPERLIPIDEMIA, UNSPECIFIED HYPERLIPIDEMIA TYPE: ICD-10-CM

## 2021-12-28 RX ORDER — ATORVASTATIN CALCIUM 40 MG/1
40 TABLET, FILM COATED ORAL DAILY
Qty: 90 TABLET | Refills: 0 | Status: SHIPPED | OUTPATIENT
Start: 2021-12-28 | End: 2022-03-28

## 2022-01-21 ENCOUNTER — IMMUNIZATION (OUTPATIENT)
Dept: PHARMACY | Facility: CLINIC | Age: 67
End: 2022-01-21
Payer: COMMERCIAL

## 2022-01-21 DIAGNOSIS — Z23 NEED FOR VACCINATION: Primary | ICD-10-CM

## 2022-03-03 ENCOUNTER — PATIENT OUTREACH (OUTPATIENT)
Dept: ADMINISTRATIVE | Facility: OTHER | Age: 67
End: 2022-03-03
Payer: COMMERCIAL

## 2022-03-03 ENCOUNTER — OFFICE VISIT (OUTPATIENT)
Dept: RHEUMATOLOGY | Facility: CLINIC | Age: 67
End: 2022-03-03
Payer: COMMERCIAL

## 2022-03-03 VITALS
BODY MASS INDEX: 26.07 KG/M2 | SYSTOLIC BLOOD PRESSURE: 127 MMHG | WEIGHT: 172 LBS | DIASTOLIC BLOOD PRESSURE: 77 MMHG | HEART RATE: 67 BPM | HEIGHT: 68 IN

## 2022-03-03 DIAGNOSIS — Z79.899 LONG-TERM USE OF PLAQUENIL: ICD-10-CM

## 2022-03-03 DIAGNOSIS — M79.3 PANNICULITIS: Primary | ICD-10-CM

## 2022-03-03 PROCEDURE — 99214 PR OFFICE/OUTPT VISIT, EST, LEVL IV, 30-39 MIN: ICD-10-PCS | Mod: S$GLB,,, | Performed by: INTERNAL MEDICINE

## 2022-03-03 PROCEDURE — 99999 PR PBB SHADOW E&M-EST. PATIENT-LVL III: ICD-10-PCS | Mod: PBBFAC,,, | Performed by: INTERNAL MEDICINE

## 2022-03-03 PROCEDURE — 3008F BODY MASS INDEX DOCD: CPT | Mod: CPTII,S$GLB,, | Performed by: INTERNAL MEDICINE

## 2022-03-03 PROCEDURE — 3078F DIAST BP <80 MM HG: CPT | Mod: CPTII,S$GLB,, | Performed by: INTERNAL MEDICINE

## 2022-03-03 PROCEDURE — 1159F MED LIST DOCD IN RCRD: CPT | Mod: CPTII,S$GLB,, | Performed by: INTERNAL MEDICINE

## 2022-03-03 PROCEDURE — 3008F PR BODY MASS INDEX (BMI) DOCUMENTED: ICD-10-PCS | Mod: CPTII,S$GLB,, | Performed by: INTERNAL MEDICINE

## 2022-03-03 PROCEDURE — 3074F PR MOST RECENT SYSTOLIC BLOOD PRESSURE < 130 MM HG: ICD-10-PCS | Mod: CPTII,S$GLB,, | Performed by: INTERNAL MEDICINE

## 2022-03-03 PROCEDURE — 99999 PR PBB SHADOW E&M-EST. PATIENT-LVL III: CPT | Mod: PBBFAC,,, | Performed by: INTERNAL MEDICINE

## 2022-03-03 PROCEDURE — 3078F PR MOST RECENT DIASTOLIC BLOOD PRESSURE < 80 MM HG: ICD-10-PCS | Mod: CPTII,S$GLB,, | Performed by: INTERNAL MEDICINE

## 2022-03-03 PROCEDURE — 1159F PR MEDICATION LIST DOCUMENTED IN MEDICAL RECORD: ICD-10-PCS | Mod: CPTII,S$GLB,, | Performed by: INTERNAL MEDICINE

## 2022-03-03 PROCEDURE — 3074F SYST BP LT 130 MM HG: CPT | Mod: CPTII,S$GLB,, | Performed by: INTERNAL MEDICINE

## 2022-03-03 PROCEDURE — 99214 OFFICE O/P EST MOD 30 MIN: CPT | Mod: S$GLB,,, | Performed by: INTERNAL MEDICINE

## 2022-03-03 RX ORDER — HYDROXYCHLOROQUINE SULFATE 200 MG/1
TABLET, FILM COATED ORAL
Qty: 180 TABLET | Refills: 4 | Status: SHIPPED | OUTPATIENT
Start: 2022-03-03

## 2022-03-03 ASSESSMENT — ROUTINE ASSESSMENT OF PATIENT INDEX DATA (RAPID3)
PSYCHOLOGICAL DISTRESS SCORE: 0
PAIN SCORE: 2
PATIENT GLOBAL ASSESSMENT SCORE: 2
MDHAQ FUNCTION SCORE: 0
AM STIFFNESS SCORE: 0, NO
FATIGUE SCORE: 2.5
TOTAL RAPID3 SCORE: 1.33

## 2022-03-03 NOTE — PROGRESS NOTES
Chief Complaint   Patient presents with    Disease Management           History of presenting illness    66 year old black male presented to me with    bruises on the legs that come and go  For 2 years now  Started in 2019 jan     Painful only when he touches it or rubbing it   Each episode lasts for a month     Can be red,swollen and blistered   More painful tender nodules     Dermatology says :    Erythema nodosum    Biopsy done :  Punch biopsy sections show a septal and lobular lympho histiocytic infiltrate with some neutrophils, rare  eosinophils, and rare multinucleated giant cells. PAS and AFB stains are negative for fungal and  mycobacterial organisms, respectively. Special stains were reviewed with adequate positive controls.  Compression socks help   He is asked to rest,ice,leg elevation      NSAIDs help with the flares     We started plaquenil 200 mg bid    He has done really well      Labs     White count   He had numbers > 4 upto 2018 and then he had drop 3.44 and the last white count was 4   His numbers are always 3's and 4's  Aug 2013 he had a bad e coli UTI and that might have led to white count of 8.21   H/h nml  Borderline thrombocytopenia 130 to 155  nml lymphocyte count  Neutropenia but 1.2 to 2.1  Hepatitis b surface and core ab negative ,HBV DNA neg  HIV negative   Hepatitis c negative   CMP always ok    MARY Positive 1: 160,profile neg  Quant immunoglobulins nml  SPEP nml    Urine once really dirty and ok when rechecked   Ct abdomen   Fluid density foci in the kidneys bilaterally largest on the right measuring 3.2 cm with a separate focus in the left kidney measuring 1.4 cm and subcentimeter small focus in the right kidney anteriorly measuring 7 mm these all likely represent cysts   though cannot exclude cystic neoplasm follow-up dedicated renal imaging is advised.       Tb gold negative   FTA-ABS negative    CXR nml  Left leg US normal    Past history  Anxiety,HTN,HLD    Family  history    Cancer       Social history    Not a smoker,alcohol user    Review of Systems     No joint pains   No malar rash,photosensitivity   No telangiectasias   No calcinosis   No psoriasis   No patchy alopecia   No oral and nasal ulcers   No dry eyes and dry mouth   No pleurisy or any cardiopulmonary complaints   No dysphagia,diplopia and dysphonia and muscle weakness   No n/v/d/c   No acid reflux+   No raynaud's+   No digital ulcers   No renal issues   No blood clots   No fever,chills,night sweats,weight loss and loss of appetite   No new onset headaches   No recurrent conjunctivitis or uveitis or scleritis or episcleritis   No chronic or bloody diarrhea with no u colitis or crohn's /inflammatory bowel disease   No penile or urethral d/c/STDs/no ulcers  No unexplained neurologic disturbances  No unexplained lymphadenopathy/parotitis          Physical Exam   Constitutional: He is oriented to person, place, and time. No distress.   HENT:   Head: Normocephalic.   Mouth/Throat: Oropharynx is clear and moist.   Eyes: Pupils are equal, round, and reactive to light. Conjunctivae are normal. Right eye exhibits no discharge. Left eye exhibits no discharge. No scleral icterus.   Neck: No thyromegaly present.   Cardiovascular: Normal rate, regular rhythm and normal heart sounds.   Pulmonary/Chest: Effort normal and breath sounds normal. No stridor.   Abdominal: Soft. Bowel sounds are normal.   Musculoskeletal:         General: Normal range of motion.      Cervical back: Normal range of motion.   Lymphadenopathy:     He has no cervical adenopathy.   Neurological: He is alert and oriented to person, place, and time.   Skin: Skin is warm. No rash noted. He is not diaphoretic.   Psychiatric: Affect and judgment normal.     Initial exam :    There is an area of skin hardening on the medial aspect of the right calf  There is subcutaneous atrophy   There is hyperpigmentation of the skin along this area with tenderness      Today  exam : benign    Assessment     66 year old black male with HTN,HLD presents with     Painful nodules on the shins Jan 2019 which were biopsied and diagnosed as erythema nodosum,he did prn NSAIDs but mostly followed the RICE protocol when possible although its not feasible for him since his job requires him to up and walking 12 hours a day,compresson stockings make the lesions hurt more.    So really no intervention made since march 2020 when it was diagnosed    When we saw him last time :  there is an area of diffuse skin hardening on the medial aspect of the right calf with subcutaneous atrophy and pigmentation of the skin.It extends posteriorly with limited anterior shin involvement   They were very painful      1. Panniculitis    2. Long-term use of Plaquenil          Indurated plaques with edema and pigmentation on the medial aspect  There is fibrosis and panniculitis of the subcutaneous fat  ??? Inverted wine bottle like in appearance     We diagnosed him with erythema nodosum      Discussed with the the causes of erythema nodosum    Bacterial   Streptococcal infection (the most common infectious cause)  Tuberculosis  Leprosy  Yersinia, Salmonella, Campylobacter gastroenteritis  Mycoplasma pneumonia  Tularemia  Leptospirosis  Brucellosis  Chlamydia trachomatis  Psittacosis  Lymphogranuloma venereum  Cat-scratch disease  Q fever (Coxiella burnetii infection)   Fungal   Coccidioidomycosis  Histoplasmosis  Blastomycosis   Viral   Infectious mononucleosis  Hepatitis B  Paravaccinia   Drugs   Oral contraceptives   Penicillin   Sulfonamides   Bromides and iodides   TNF-alpha inhibitors (rare)   Inflammatory bowel disease   Crohn's disease (more often than ulcerative colitis)   Ulcerative colitis   Malignancy   Lymphoma (Hodgkin, most often)   Leukemia (acute myelogenous, most often)   Internal carcinomas   Miscellaneous   Sarcoidosis   Pregnancy   Whipple disease   Behçet disease   Sweet syndrome       Conditions  associated with erythema nodosum in case series*    [place]  (1954-68) [place]  (1959-69) Yonathan  (1973-82) Shira  (1960-95) [place]  (1982-92) [place]  (1984-90) Ann  (1988-97) [place]  (1994-97)   Idiopathic/unknown 17% 28% 32% 55% 72% 35% 34% 60%   Sarcoidosis 14% 24% 1 patient 11% 0% 28% 22% 0%   Streptococcal infection 48% 14% 44% 28% 6% 6% 7% 9%   TB 20% 1 patient 1 patient 1 patient 12% 1.5% 5% 3%   Infections other than TB or strep¶ 1 patient 9% 0% 2.5% 0% 11% 22% 21%   Pregnancy or OCAs 0% 0% 10% 3% 0%? 10% 1 patient 4%   Drugs other than OCAs 0% 6% 6% 0% 7% 4% 2% 0%   Inflammatory bowel disease 0% 16% 1 patient 1.5% 0% 0% 3% 0%   Behcet syndrome 0% 0% 0% 0% 3% 4% 2% 3%   Other? 1 patient 7% 1 patient 0% 0% 1 patient 3% 0%       He does have a positive MARY and leukopenia and thrombocytopenia with no other findings of systemic lupus  He has no symptoms of sarcoidosis and also has had a normal CXR    Considered lupus panniculitis on the differential in the setting of MARY positivity and gave him a trial of plaquenil 200 mg bid to see if that help    Plaquenil 200 mg bid helps really well    Derm agreed and suggested taper after some time    Today we discussed to taper the dose to 300 mg daily  Eye exam asap for plaquenil eye exam and also to help with the pigmentation-issues       Cuate was seen today for disease management.    Diagnoses and all orders for this visit:    Panniculitis  -     Ambulatory referral/consult to Ophthalmology; Future    Long-term use of Plaquenil  -     Ambulatory referral/consult to Ophthalmology; Future    Other orders  -     hydrOXYchloroQUINE (PLAQUENIL) 200 mg tablet; 200 mg bid

## 2022-03-03 NOTE — PROGRESS NOTES
Health Maintenance Due   Topic Date Due    Shingles Vaccine (2 of 2) 03/23/2021    Influenza Vaccine (1) 09/01/2021    Lipid Panel  12/15/2021     Updates were requested from care everywhere.  Chart was reviewed for overdue Proactive [facility] Encounters (RICARDO) topics (CRS, Breast Cancer Screening, Eye exam)  Health Maintenance has been updated.  LINKS immunization registry triggered.  Immunizations were reconciled.

## 2022-03-23 DIAGNOSIS — I10 ESSENTIAL HYPERTENSION: ICD-10-CM

## 2022-03-23 RX ORDER — LOSARTAN POTASSIUM 50 MG/1
TABLET ORAL
Qty: 90 TABLET | Refills: 3 | Status: SHIPPED | OUTPATIENT
Start: 2022-03-23 | End: 2023-05-31

## 2022-03-23 NOTE — TELEPHONE ENCOUNTER
Care Due:                  Date            Visit Type   Department     Provider  --------------------------------------------------------------------------------                                EP -                              PRIMARY      [facility] INTERNAL  Last Visit: 03-      CARE ([facility])   MEDICINE       Carolyne Beth                              EP -                              PRIMARY      [facility] INTERNAL  Next Visit: 04-      CARE ([facility])   MEDICINE       Carolyne Beth                                                            Last  Test          Frequency    Reason                     Performed    Due Date  --------------------------------------------------------------------------------    CMP.........  12 months..  atorvastatin, losartan...  12-   12-    Lipid Panel.  12 months..  atorvastatin.............  12-   12-    Powered by [employer]. Reference number: [id number].   3/23/2022 3:59:38 AM CDT

## 2022-03-27 DIAGNOSIS — E78.5 HYPERLIPIDEMIA, UNSPECIFIED HYPERLIPIDEMIA TYPE: ICD-10-CM

## 2022-03-27 NOTE — TELEPHONE ENCOUNTER
No new care gaps identified.  Powered by [employer]. Reference number: [id number].   3/27/2022 4:00:44 AM CDT

## 2022-03-28 RX ORDER — ATORVASTATIN CALCIUM 40 MG/1
TABLET, FILM COATED ORAL
Qty: 90 TABLET | Refills: 0 | Status: SHIPPED | OUTPATIENT
Start: 2022-03-28 | End: 2022-06-24

## 2022-03-28 NOTE — TELEPHONE ENCOUNTER
This Rx Request does not qualify for assessment with the OR   Please review protocol details and the Care Due Message for extra clinical information    Reasons Rx Request may be deferred:  Labs/Vitals overdue    Note composed:1:38 PM 03/28/2022

## 2022-04-13 ENCOUNTER — TELEPHONE (OUTPATIENT)
Dept: OTOLARYNGOLOGY | Facility: CLINIC | Age: 67
End: 2022-04-13
Payer: COMMERCIAL

## 2022-04-13 NOTE — TELEPHONE ENCOUNTER
----- Message from Wendi Wiseman sent at 4/13/2022 12:34 PM CDT -----  Contact: WINIFRED FERNANDEZ [[id number]]  Name of Who is Calling:WINIFRED FERNANDEZ [[id number]]          What is the request in detail:Patient calling in regards to getting an appointment due to hearing fading in an out. Patient states that the issue started last night around 7-8pm.Please advise.          Can the clinic reply by [facility]:N          What Number to Call Back if not in [facility]:[phone]

## 2022-05-02 ENCOUNTER — CLINICAL SUPPORT (OUTPATIENT)
Dept: OTOLARYNGOLOGY | Facility: CLINIC | Age: 67
End: 2022-05-02
Payer: COMMERCIAL

## 2022-05-02 ENCOUNTER — OFFICE VISIT (OUTPATIENT)
Dept: OTOLARYNGOLOGY | Facility: CLINIC | Age: 67
End: 2022-05-02
Payer: COMMERCIAL

## 2022-05-02 VITALS
TEMPERATURE: 98 F | HEART RATE: 64 BPM | SYSTOLIC BLOOD PRESSURE: 115 MMHG | HEIGHT: 68 IN | WEIGHT: 167.31 LBS | DIASTOLIC BLOOD PRESSURE: 67 MMHG | BODY MASS INDEX: 25.36 KG/M2

## 2022-05-02 DIAGNOSIS — H93.8X2 STUFFY EARS, LEFT: ICD-10-CM

## 2022-05-02 DIAGNOSIS — H93.12 TINNITUS, LEFT: ICD-10-CM

## 2022-05-02 DIAGNOSIS — H61.22 IMPACTED CERUMEN OF LEFT EAR: ICD-10-CM

## 2022-05-02 DIAGNOSIS — H90.3 SENSORINEURAL HEARING LOSS, BILATERAL: ICD-10-CM

## 2022-05-02 DIAGNOSIS — Z77.122 HISTORY OF EXPOSURE TO NOISE: ICD-10-CM

## 2022-05-02 DIAGNOSIS — H90.3 SENSORINEURAL HEARING LOSS, BILATERAL: Primary | ICD-10-CM

## 2022-05-02 PROCEDURE — 3074F PR MOST RECENT SYSTOLIC BLOOD PRESSURE < 130 MM HG: ICD-10-PCS | Mod: CPTII,S$GLB,, | Performed by: OTOLARYNGOLOGY

## 2022-05-02 PROCEDURE — 92557 PR COMPREHENSIVE HEARING TEST: ICD-10-PCS | Mod: S$GLB,,, | Performed by: AUDIOLOGIST

## 2022-05-02 PROCEDURE — 3044F HG A1C LEVEL LT 7.0%: CPT | Mod: CPTII,S$GLB,, | Performed by: OTOLARYNGOLOGY

## 2022-05-02 PROCEDURE — 1160F PR REVIEW ALL MEDS BY PRESCRIBER/CLIN PHARMACIST DOCUMENTED: ICD-10-PCS | Mod: CPTII,S$GLB,, | Performed by: OTOLARYNGOLOGY

## 2022-05-02 PROCEDURE — 99214 OFFICE O/P EST MOD 30 MIN: CPT | Mod: S$GLB,,, | Performed by: OTOLARYNGOLOGY

## 2022-05-02 PROCEDURE — 3078F DIAST BP <80 MM HG: CPT | Mod: CPTII,S$GLB,, | Performed by: OTOLARYNGOLOGY

## 2022-05-02 PROCEDURE — 1159F PR MEDICATION LIST DOCUMENTED IN MEDICAL RECORD: ICD-10-PCS | Mod: CPTII,S$GLB,, | Performed by: OTOLARYNGOLOGY

## 2022-05-02 PROCEDURE — 92567 PR TYMPA2METRY: ICD-10-PCS | Mod: S$GLB,,, | Performed by: AUDIOLOGIST

## 2022-05-02 PROCEDURE — 92557 COMPREHENSIVE HEARING TEST: CPT | Mod: S$GLB,,, | Performed by: AUDIOLOGIST

## 2022-05-02 PROCEDURE — 3074F SYST BP LT 130 MM HG: CPT | Mod: CPTII,S$GLB,, | Performed by: OTOLARYNGOLOGY

## 2022-05-02 PROCEDURE — 1160F RVW MEDS BY RX/DR IN RCRD: CPT | Mod: CPTII,S$GLB,, | Performed by: OTOLARYNGOLOGY

## 2022-05-02 PROCEDURE — 3078F PR MOST RECENT DIASTOLIC BLOOD PRESSURE < 80 MM HG: ICD-10-PCS | Mod: CPTII,S$GLB,, | Performed by: OTOLARYNGOLOGY

## 2022-05-02 PROCEDURE — 99214 PR OFFICE/OUTPT VISIT, EST, LEVL IV, 30-39 MIN: ICD-10-PCS | Mod: S$GLB,,, | Performed by: OTOLARYNGOLOGY

## 2022-05-02 PROCEDURE — 3008F BODY MASS INDEX DOCD: CPT | Mod: CPTII,S$GLB,, | Performed by: OTOLARYNGOLOGY

## 2022-05-02 PROCEDURE — 3044F PR MOST RECENT HEMOGLOBIN A1C LEVEL <7.0%: ICD-10-PCS | Mod: CPTII,S$GLB,, | Performed by: OTOLARYNGOLOGY

## 2022-05-02 PROCEDURE — 92567 TYMPANOMETRY: CPT | Mod: S$GLB,,, | Performed by: AUDIOLOGIST

## 2022-05-02 PROCEDURE — 4010F PR ACE/ARB THEARPY RXD/TAKEN: ICD-10-PCS | Mod: CPTII,S$GLB,, | Performed by: OTOLARYNGOLOGY

## 2022-05-02 PROCEDURE — 3008F PR BODY MASS INDEX (BMI) DOCUMENTED: ICD-10-PCS | Mod: CPTII,S$GLB,, | Performed by: OTOLARYNGOLOGY

## 2022-05-02 PROCEDURE — 1159F MED LIST DOCD IN RCRD: CPT | Mod: CPTII,S$GLB,, | Performed by: OTOLARYNGOLOGY

## 2022-05-02 PROCEDURE — 4010F ACE/ARB THERAPY RXD/TAKEN: CPT | Mod: CPTII,S$GLB,, | Performed by: OTOLARYNGOLOGY

## 2022-05-02 NOTE — PROGRESS NOTES
Cuate Fair, a 66 y.o. male, was seen today in the clinic for an audiologic evaluation.     Tympanometry revealed Type A in the right ear and Type A in the left ear. Audiogram results revealed normal sloping to severe sensorineural hearing loss (SNHL) in the right ear and normal sloping to severe SNHL in the left ear.  Speech reception thresholds were noted at 10 dB in the right ear and 10 dB in the left ear.  Speech discrimination scores were 92% in the right ear and 96% in the left ear.    Recommendations:  1. Otologic evaluation  2. Annual audiogram  3. Hearing protection when in noise

## 2022-05-02 NOTE — PATIENT INSTRUCTIONS
Hearing protection.    Recheck one year for cleaning and follow up audiogram unless change or problems prior.

## 2022-05-15 ENCOUNTER — OFFICE VISIT (OUTPATIENT)
Dept: URGENT CARE | Facility: CLINIC | Age: 67
End: 2022-05-15
Payer: COMMERCIAL

## 2022-05-15 VITALS
SYSTOLIC BLOOD PRESSURE: 130 MMHG | HEIGHT: 68 IN | DIASTOLIC BLOOD PRESSURE: 81 MMHG | TEMPERATURE: 98 F | BODY MASS INDEX: 25.31 KG/M2 | HEART RATE: 71 BPM | WEIGHT: 167 LBS | OXYGEN SATURATION: 97 %

## 2022-05-15 DIAGNOSIS — R42 DIZZINESS: ICD-10-CM

## 2022-05-15 DIAGNOSIS — Z11.9 ENCOUNTER FOR SCREENING EXAMINATION FOR INFECTIOUS DISEASE: ICD-10-CM

## 2022-05-15 DIAGNOSIS — R07.9 CHEST PAIN, UNSPECIFIED TYPE: ICD-10-CM

## 2022-05-15 DIAGNOSIS — E16.2 HYPOGLYCEMIA: Primary | ICD-10-CM

## 2022-05-15 LAB
CTP QC/QA: YES
CTP QC/QA: YES
GLUCOSE SERPL-MCNC: 58 MG/DL (ref 70–110)
HGB, POC: 16.8 G/DL (ref 13–17)
SARS-COV-2 RDRP RESP QL NAA+PROBE: NEGATIVE

## 2022-05-15 PROCEDURE — U0002 COVID-19 LAB TEST NON-CDC: HCPCS | Mod: QW,S$GLB,,

## 2022-05-15 PROCEDURE — 3075F SYST BP GE 130 - 139MM HG: CPT | Mod: CPTII,S$GLB,,

## 2022-05-15 PROCEDURE — 99214 OFFICE O/P EST MOD 30 MIN: CPT | Mod: S$GLB,CS,,

## 2022-05-15 PROCEDURE — 85018 HEMOGLOBIN: CPT | Mod: QW,S$GLB,,

## 2022-05-15 PROCEDURE — 1159F MED LIST DOCD IN RCRD: CPT | Mod: CPTII,S$GLB,,

## 2022-05-15 PROCEDURE — 82962 POCT GLUCOSE, HAND-HELD DEVICE: ICD-10-PCS | Mod: S$GLB,,,

## 2022-05-15 PROCEDURE — 93005 EKG 12-LEAD: ICD-10-PCS | Mod: S$GLB,,,

## 2022-05-15 PROCEDURE — 71046 XR CHEST PA AND LATERAL: ICD-10-PCS | Mod: S$GLB,,, | Performed by: RADIOLOGY

## 2022-05-15 PROCEDURE — 1159F PR MEDICATION LIST DOCUMENTED IN MEDICAL RECORD: ICD-10-PCS | Mod: CPTII,S$GLB,,

## 2022-05-15 PROCEDURE — 93010 EKG 12-LEAD: ICD-10-PCS | Mod: S$GLB,,, | Performed by: INTERNAL MEDICINE

## 2022-05-15 PROCEDURE — 1160F PR REVIEW ALL MEDS BY PRESCRIBER/CLIN PHARMACIST DOCUMENTED: ICD-10-PCS | Mod: CPTII,S$GLB,,

## 2022-05-15 PROCEDURE — 3079F DIAST BP 80-89 MM HG: CPT | Mod: CPTII,S$GLB,,

## 2022-05-15 PROCEDURE — 4010F PR ACE/ARB THEARPY RXD/TAKEN: ICD-10-PCS | Mod: CPTII,S$GLB,,

## 2022-05-15 PROCEDURE — 3079F PR MOST RECENT DIASTOLIC BLOOD PRESSURE 80-89 MM HG: ICD-10-PCS | Mod: CPTII,S$GLB,,

## 2022-05-15 PROCEDURE — 4010F ACE/ARB THERAPY RXD/TAKEN: CPT | Mod: CPTII,S$GLB,,

## 2022-05-15 PROCEDURE — 82962 GLUCOSE BLOOD TEST: CPT | Mod: S$GLB,,,

## 2022-05-15 PROCEDURE — 71046 X-RAY EXAM CHEST 2 VIEWS: CPT | Mod: S$GLB,,, | Performed by: RADIOLOGY

## 2022-05-15 PROCEDURE — 93010 ELECTROCARDIOGRAM REPORT: CPT | Mod: S$GLB,,, | Performed by: INTERNAL MEDICINE

## 2022-05-15 PROCEDURE — 85018 POCT HEMOGLOBIN (QC): ICD-10-PCS | Mod: QW,S$GLB,,

## 2022-05-15 PROCEDURE — 3008F PR BODY MASS INDEX (BMI) DOCUMENTED: ICD-10-PCS | Mod: CPTII,S$GLB,,

## 2022-05-15 PROCEDURE — 93005 ELECTROCARDIOGRAM TRACING: CPT | Mod: S$GLB,,,

## 2022-05-15 PROCEDURE — 1160F RVW MEDS BY RX/DR IN RCRD: CPT | Mod: CPTII,S$GLB,,

## 2022-05-15 PROCEDURE — U0002: ICD-10-PCS | Mod: QW,S$GLB,,

## 2022-05-15 PROCEDURE — 3008F BODY MASS INDEX DOCD: CPT | Mod: CPTII,S$GLB,,

## 2022-05-15 PROCEDURE — 3075F PR MOST RECENT SYSTOLIC BLOOD PRESS GE 130-139MM HG: ICD-10-PCS | Mod: CPTII,S$GLB,,

## 2022-05-15 PROCEDURE — 99214 PR OFFICE/OUTPT VISIT, EST, LEVL IV, 30-39 MIN: ICD-10-PCS | Mod: S$GLB,CS,,

## 2022-05-15 NOTE — PROGRESS NOTES
"Subjective:       Patient ID: Cuate Fair is a 67 y.o. male.    Vitals:  height is 5' 8" (1.727 m) and weight is 75.8 kg (167 lb). His temperature is 97.5 °F (36.4 °C). His blood pressure is 130/81 and his pulse is 71. His oxygen saturation is 97%.     Chief Complaint: Hypertension    66 yo male presents to urgent care for evaluation. Patient c/o "not feeling well" since yesterday. States he was in [employer] this morning when he began to feel faint and dizzy. States he could not stand very long due to this and he felt weak. He left [employer] and went home to check his BP. Noted it was 158/104 which is elevated for him. He was concerned so his son picked him up to bring him to urgent care. On the way here he had another episode of feeling dizzy in the car. States he also felt like he "lost control of his body function". On further questioning about this, he states he just felt weak. He states he slumped down into his seat in the car, felt faint, and his eyes were watering. Also notes that he had left sided chest pain during this episode. Described it as a pressure. Episode lasted 1 minute. He has not had chest pain or SOB since. Just states that he does not feel like himself.     Hypertension  This is a new problem. The current episode started yesterday. The problem is unchanged. The problem is controlled. Associated symptoms include malaise/fatigue.     ROS    Objective:      Physical Exam   Constitutional: He is oriented to person, place, and time. He appears well-developed. He is cooperative.  Non-toxic appearance. He does not appear ill. No distress.   HENT:   Head: Normocephalic and atraumatic.   Ears:   Right Ear: Hearing, tympanic membrane, external ear and ear canal normal.   Left Ear: Hearing, tympanic membrane, external ear and ear canal normal.   Nose: Nose normal. No mucosal edema, rhinorrhea or nasal deformity. No epistaxis. Right sinus exhibits no maxillary sinus tenderness and no frontal sinus " tenderness. Left sinus exhibits no maxillary sinus tenderness and no frontal sinus tenderness.   Mouth/Throat: Uvula is midline, oropharynx is clear and moist and mucous membranes are normal. No trismus in the jaw. Normal dentition. No uvula swelling. No posterior oropharyngeal erythema.   Eyes: Conjunctivae and lids are normal. Right eye exhibits no discharge. Left eye exhibits no discharge. No scleral icterus.   Neck: Trachea normal and phonation normal. Neck supple.   Cardiovascular: Normal rate, regular rhythm, normal heart sounds and normal pulses.   Pulmonary/Chest: Effort normal and breath sounds normal. No respiratory distress.   Abdominal: Normal appearance and bowel sounds are normal. He exhibits no distension, no pulsatile midline mass and no mass. Soft. There is no abdominal tenderness.   Musculoskeletal: Normal range of motion.         General: No deformity. Normal range of motion.   Neurological: He is alert and oriented to person, place, and time. He exhibits normal muscle tone. Coordination normal.      Comments: Alert, oriented x 3. EOMI, PERRLA.     Cranial nerves intact: facial expressions (smile, raising eyebrows, shutting eyes, pursed lips) symmetric. Shoulder shrug strength 5/5; Jaw is midline without deviation. Tongue protrudes at midline without fasciculations. Sensation to face in distribution of CN V1, V2, and V3 intact. Sensation to upper and lower extremities intact.     Finger to nose, and heel to shin test are intact and smooth bilaterally.     Patient ambulates unassisted without rigidity or ataxia. Romberg negative. V    Voice quality, comprehension, articulation  assessed as appropriate.      Skin: Skin is warm, dry, intact, not diaphoretic and not pale.         Comments: Palpable nodule on the posterior portion of right lower leg with overlying erythema and scaly dry skin. Not warm or painful to the touch. There is also a similar nodule on his right palm   Psychiatric: His speech is  "normal and behavior is normal. Judgment and thought content normal.   Nursing note and vitals reviewed.        Results for orders placed or performed in visit on 05/15/22   POCT COVID-19 Rapid Screening   Result Value Ref Range    POC Rapid COVID Negative Negative     Acceptable Yes    POCT Glucose, Hand-Held Device   Result Value Ref Range    POC Glucose 58 (A) 70 - 110 MG/DL   POCT Hemoglobin (QC) - [facility]   Result Value Ref Range    Hemoglobin 16.8 13.0 - 17.0 g/dL     Acceptable Yes      EKG: NSR, rate of 61 bpm. No ST segment changes    XR CHEST PA AND LATERAL    Result Date: 5/15/2022  EXAMINATION: XR CHEST PA AND LATERAL CLINICAL HISTORY: Chest pain, unspecified TECHNIQUE: PA and lateral views of the chest were performed. COMPARISON: 03/03/2020 FINDINGS: The cardiomediastinal silhouette is not enlarged.  There is no pleural effusion.  The trachea is midline.  The lungs are symmetrically expanded bilaterally without evidence of acute parenchymal process. No large focal consolidation seen.  There may be a calcified granuloma projected over the left midlung zone.  There is no pneumothorax.  The osseous structures are remarkable for degenerative changes..     1. No acute cardiopulmonary process. Electronically signed by: Erick Andres MD Date:    05/15/2022 Time:    12:16    Vitals:    05/15/22 1133 05/15/22 1229 05/15/22 1230 05/15/22 1232   BP: 118/64 115/72 117/83 130/81   BP Location:  Right arm Right arm Right arm   Patient Position:  Lying Standing Standing   BP Method:  Large (Automatic) Large (Automatic) Large (Automatic)   Pulse: 60 63 63 71   Temp: 97.5 °F (36.4 °C)      SpO2: 97%      Weight: 75.8 kg (167 lb)      Height: 5' 8" (1.727 m)          Assessment:       1. Hypoglycemia    2. Encounter for screening examination for infectious disease    3. Chest pain, unspecified type    4. Dizziness          Plan:         Patient is here after episode of dizziness and feeling " faint that occurred this morning. Notes hi BP was elevated at the time. Patient also reporting one minute of left sided chest pressure with a feeling of weakness on his way here so EKG and CXR was done in clinic. EKG, CXR, POCT Hgb all WNL. Orthostatics negative. POCT Glucose was 58. Patient notes that he ate pancakes with syrup and had orange juice this morning. He has never had issues with his blood sugar. We discussed hypoglycemia of unknown cause vs underlying cardiac arrhythmia as cause of his symptoms. Urgent cardiology referral placed. Patient may need Holter monitor as he previously had one in 2018 on chart review. Patient states he will go home and eat (I included a list of quick glucose sources in paperwork) and see if he starts to feel better. He declined glucose tablet in clinic. States that if he does not improve he will go to the ER this evening. I also gave him strict ER precautions that if any chest pain, SOB, repeat episode of dizziness/faintness should occur before he is seen by PCP on the 19th or by cardiology, he needs to go to the ER. Educational handouts provided. Patient verbalized understanding to above plan of care and had no further questions.   Case also discussed with Dr. Quinn in clinic, we spoke in depth about the above plan of care.    Hypoglycemia    Encounter for screening examination for infectious disease  -     POCT COVID-19 Rapid Screening    Chest pain, unspecified type  -     IN OFFICE EKG 12-LEAD (to [place])  -     XR CHEST PA AND LATERAL; Future; Expected date: 05/15/2022    Dizziness  -     Orthostatic vital signs  -     POCT Glucose, Hand-Held Device  -     POCT Hemoglobin (QC) - [facility]  -     Ambulatory referral/consult to Cardiology         Patient Instructions     Chest Tightness Discharge   Please go to the Emergency Department for any concerns or worsening of condition such as:  · Shortness of breath, difficulty breathing, or breathing fast  · Chest pain gets worse when  you breathe  · Severe pain that comes on suddenly or lasts more than an hour  · Dizziness, weakness, or fainting  · Fever   · Left Arm Pain, increased and (pumping) heart beat,  Visual Disturbances,left  jaw, back or shoulder pain  Follow up with your PCP in the next 2-3 days for no improvement in symptoms.    If you  smoke, please stop smoking.      General Referral to [facility]   You were referred to [facility] for follow-up care on your condition.    Please call [phone] to schedule your appointment.    Please go to the Emergency Department for any concerns or worsening of condition.  Please follow up with your primary care doctor or specialist in the next 48-72hrs as needed.    If you  smoke, please stop smoking.

## 2022-05-15 NOTE — PATIENT INSTRUCTIONS
Chest Tightness Discharge   Please go to the Emergency Department for any concerns or worsening of condition such as:  Shortness of breath, difficulty breathing, or breathing fast  Chest pain gets worse when you breathe  Severe pain that comes on suddenly or lasts more than an hour  Dizziness, weakness, or fainting  Fever   Left Arm Pain, increased and (pumping) heart beat,  Visual Disturbances,left  jaw, back or shoulder pain  Follow up with your PCP in the next 2-3 days for no improvement in symptoms.    If you  smoke, please stop smoking.      General Referral to [facility]   You were referred to [facility] for follow-up care on your condition.    Please call [phone] to schedule your appointment.    Please go to the Emergency Department for any concerns or worsening of condition.  Please follow up with your primary care doctor or specialist in the next 48-72hrs as needed.    If you  smoke, please stop smoking.

## 2022-05-16 ENCOUNTER — TELEPHONE (OUTPATIENT)
Dept: INTERNAL MEDICINE | Facility: CLINIC | Age: 67
End: 2022-05-16
Payer: COMMERCIAL

## 2022-05-16 NOTE — TELEPHONE ENCOUNTER
Patient is requesting his upcoming visit for annual on 5/19/2022 also to be his visit for his Urgent Care follow up. Please advise if this is possible as patient states that he does not want to come in twice for a visit. Routed to Dr. Beth for further instructions.

## 2022-05-16 NOTE — TELEPHONE ENCOUNTER
----- Message from Payton Parekh sent at 5/16/2022 11:48 AM CDT -----  Regarding: concerns  Name of Who is Calling: Cuate           What is the request in detail: Patient is requesting a call back. He went to Urgent Care 5/15 for elevated blood pressure and have an annual appointment 5/19. He want to know if he need to come sooner than that.           Can the clinic reply by [facility]: No           What Number to Call Back if not in [facility]: [phone]

## 2022-05-19 ENCOUNTER — LAB VISIT (OUTPATIENT)
Dept: LAB | Facility: OTHER | Age: 67
End: 2022-05-19
Attending: INTERNAL MEDICINE
Payer: COMMERCIAL

## 2022-05-19 ENCOUNTER — OFFICE VISIT (OUTPATIENT)
Dept: CARDIOLOGY | Facility: CLINIC | Age: 67
End: 2022-05-19
Payer: COMMERCIAL

## 2022-05-19 ENCOUNTER — OFFICE VISIT (OUTPATIENT)
Dept: INTERNAL MEDICINE | Facility: CLINIC | Age: 67
End: 2022-05-19
Attending: INTERNAL MEDICINE
Payer: COMMERCIAL

## 2022-05-19 VITALS
WEIGHT: 166 LBS | HEART RATE: 70 BPM | OXYGEN SATURATION: 98 % | BODY MASS INDEX: 25.16 KG/M2 | SYSTOLIC BLOOD PRESSURE: 122 MMHG | DIASTOLIC BLOOD PRESSURE: 80 MMHG | HEIGHT: 68 IN

## 2022-05-19 VITALS
HEART RATE: 66 BPM | SYSTOLIC BLOOD PRESSURE: 118 MMHG | WEIGHT: 166 LBS | OXYGEN SATURATION: 97 % | BODY MASS INDEX: 25.24 KG/M2 | DIASTOLIC BLOOD PRESSURE: 64 MMHG

## 2022-05-19 DIAGNOSIS — F41.8 SITUATIONAL ANXIETY: ICD-10-CM

## 2022-05-19 DIAGNOSIS — L52 ERYTHEMA NODOSUM: ICD-10-CM

## 2022-05-19 DIAGNOSIS — E53.8 B12 DEFICIENCY: ICD-10-CM

## 2022-05-19 DIAGNOSIS — Z00.00 ANNUAL PHYSICAL EXAM: Primary | ICD-10-CM

## 2022-05-19 DIAGNOSIS — H53.9 VISION CHANGES: ICD-10-CM

## 2022-05-19 DIAGNOSIS — Z00.00 ANNUAL PHYSICAL EXAM: ICD-10-CM

## 2022-05-19 DIAGNOSIS — Z12.5 SCREENING FOR PROSTATE CANCER: ICD-10-CM

## 2022-05-19 DIAGNOSIS — I11.9 HYPERTENSIVE HEART DISEASE, UNSPECIFIED WHETHER HEART FAILURE PRESENT: Primary | ICD-10-CM

## 2022-05-19 DIAGNOSIS — Z51.81 ENCOUNTER FOR MEDICATION MONITORING: ICD-10-CM

## 2022-05-19 DIAGNOSIS — I10 ESSENTIAL HYPERTENSION: ICD-10-CM

## 2022-05-19 DIAGNOSIS — F41.9 ANXIETY: ICD-10-CM

## 2022-05-19 LAB
ALBUMIN SERPL BCP-MCNC: 4.4 G/DL (ref 3.5–5.2)
ALP SERPL-CCNC: 67 U/L (ref 55–135)
ALT SERPL W/O P-5'-P-CCNC: 20 U/L (ref 10–44)
ANION GAP SERPL CALC-SCNC: 8 MMOL/L (ref 8–16)
AST SERPL-CCNC: 26 U/L (ref 10–40)
BASOPHILS # BLD AUTO: 0.03 K/UL (ref 0–0.2)
BASOPHILS NFR BLD: 0.8 % (ref 0–1.9)
BILIRUB SERPL-MCNC: 0.7 MG/DL (ref 0.1–1)
BUN SERPL-MCNC: 19 MG/DL (ref 8–23)
C PEPTIDE SERPL-MCNC: 3.47 NG/ML (ref 0.78–5.19)
CALCIUM SERPL-MCNC: 9.2 MG/DL (ref 8.7–10.5)
CHLORIDE SERPL-SCNC: 106 MMOL/L (ref 95–110)
CHOLEST SERPL-MCNC: 137 MG/DL (ref 120–199)
CHOLEST/HDLC SERPL: 2.5 {RATIO} (ref 2–5)
CO2 SERPL-SCNC: 27 MMOL/L (ref 23–29)
COMPLEXED PSA SERPL-MCNC: 2.1 NG/ML (ref 0–4)
CREAT SERPL-MCNC: 1.2 MG/DL (ref 0.5–1.4)
DIFFERENTIAL METHOD: ABNORMAL
EOSINOPHIL # BLD AUTO: 0.2 K/UL (ref 0–0.5)
EOSINOPHIL NFR BLD: 3.9 % (ref 0–8)
ERYTHROCYTE [DISTWIDTH] IN BLOOD BY AUTOMATED COUNT: 12.4 % (ref 11.5–14.5)
EST. GFR  (AFRICAN AMERICAN): >60 ML/MIN/1.73 M^2
EST. GFR  (NON AFRICAN AMERICAN): >60 ML/MIN/1.73 M^2
ESTIMATED AVG GLUCOSE: 111 MG/DL (ref 68–131)
GLUCOSE SERPL-MCNC: 87 MG/DL (ref 70–110)
HBA1C MFR BLD: 5.5 % (ref 4–5.6)
HCT VFR BLD AUTO: 50.4 % (ref 40–54)
HDLC SERPL-MCNC: 55 MG/DL (ref 40–75)
HDLC SERPL: 40.1 % (ref 20–50)
HGB BLD-MCNC: 17.3 G/DL (ref 14–18)
IMM GRANULOCYTES # BLD AUTO: 0 K/UL (ref 0–0.04)
IMM GRANULOCYTES NFR BLD AUTO: 0 % (ref 0–0.5)
INSULIN COLLECTION INTERVAL: NORMAL
INSULIN SERPL-ACNC: 13.7 UU/ML
LDLC SERPL CALC-MCNC: 74.4 MG/DL (ref 63–159)
LYMPHOCYTES # BLD AUTO: 1.2 K/UL (ref 1–4.8)
LYMPHOCYTES NFR BLD: 32 % (ref 18–48)
MCH RBC QN AUTO: 30.8 PG (ref 27–31)
MCHC RBC AUTO-ENTMCNC: 34.3 G/DL (ref 32–36)
MCV RBC AUTO: 90 FL (ref 82–98)
MONOCYTES # BLD AUTO: 0.5 K/UL (ref 0.3–1)
MONOCYTES NFR BLD: 12.3 % (ref 4–15)
NEUTROPHILS # BLD AUTO: 1.9 K/UL (ref 1.8–7.7)
NEUTROPHILS NFR BLD: 51 % (ref 38–73)
NONHDLC SERPL-MCNC: 82 MG/DL
NRBC BLD-RTO: 0 /100 WBC
PLATELET # BLD AUTO: 158 K/UL (ref 150–450)
PMV BLD AUTO: 10.5 FL (ref 9.2–12.9)
POTASSIUM SERPL-SCNC: 4.2 MMOL/L (ref 3.5–5.1)
PROT SERPL-MCNC: 7.5 G/DL (ref 6–8.4)
RBC # BLD AUTO: 5.61 M/UL (ref 4.6–6.2)
SODIUM SERPL-SCNC: 141 MMOL/L (ref 136–145)
TRIGL SERPL-MCNC: 38 MG/DL (ref 30–150)
TSH SERPL DL<=0.005 MIU/L-ACNC: 0.7 UIU/ML (ref 0.4–4)
WBC # BLD AUTO: 3.81 K/UL (ref 3.9–12.7)

## 2022-05-19 PROCEDURE — 99999 PR PBB SHADOW E&M-EST. PATIENT-LVL V: ICD-10-PCS | Mod: PBBFAC,,, | Performed by: INTERNAL MEDICINE

## 2022-05-19 PROCEDURE — 3008F BODY MASS INDEX DOCD: CPT | Mod: CPTII,S$GLB,, | Performed by: INTERNAL MEDICINE

## 2022-05-19 PROCEDURE — 4010F PR ACE/ARB THEARPY RXD/TAKEN: ICD-10-PCS | Mod: CPTII,S$GLB,, | Performed by: INTERNAL MEDICINE

## 2022-05-19 PROCEDURE — 3008F PR BODY MASS INDEX (BMI) DOCUMENTED: ICD-10-PCS | Mod: CPTII,S$GLB,, | Performed by: INTERNAL MEDICINE

## 2022-05-19 PROCEDURE — 3044F PR MOST RECENT HEMOGLOBIN A1C LEVEL <7.0%: ICD-10-PCS | Mod: CPTII,S$GLB,, | Performed by: INTERNAL MEDICINE

## 2022-05-19 PROCEDURE — 85025 COMPLETE CBC W/AUTO DIFF WBC: CPT | Performed by: INTERNAL MEDICINE

## 2022-05-19 PROCEDURE — 83525 ASSAY OF INSULIN: CPT | Performed by: INTERNAL MEDICINE

## 2022-05-19 PROCEDURE — 1101F PT FALLS ASSESS-DOCD LE1/YR: CPT | Mod: CPTII,S$GLB,, | Performed by: INTERNAL MEDICINE

## 2022-05-19 PROCEDURE — 3074F SYST BP LT 130 MM HG: CPT | Mod: CPTII,S$GLB,, | Performed by: INTERNAL MEDICINE

## 2022-05-19 PROCEDURE — 99214 PR OFFICE/OUTPT VISIT, EST, LEVL IV, 30-39 MIN: ICD-10-PCS | Mod: S$GLB,,, | Performed by: INTERNAL MEDICINE

## 2022-05-19 PROCEDURE — 1159F PR MEDICATION LIST DOCUMENTED IN MEDICAL RECORD: ICD-10-PCS | Mod: CPTII,S$GLB,, | Performed by: INTERNAL MEDICINE

## 2022-05-19 PROCEDURE — 84153 ASSAY OF PSA TOTAL: CPT | Performed by: INTERNAL MEDICINE

## 2022-05-19 PROCEDURE — 1101F PR PT FALLS ASSESS DOC 0-1 FALLS W/OUT INJ PAST YR: ICD-10-PCS | Mod: CPTII,S$GLB,, | Performed by: INTERNAL MEDICINE

## 2022-05-19 PROCEDURE — 80061 LIPID PANEL: CPT | Performed by: INTERNAL MEDICINE

## 2022-05-19 PROCEDURE — 3288F PR FALLS RISK ASSESSMENT DOCUMENTED: ICD-10-PCS | Mod: CPTII,S$GLB,, | Performed by: INTERNAL MEDICINE

## 2022-05-19 PROCEDURE — 99999 PR PBB SHADOW E&M-EST. PATIENT-LVL III: ICD-10-PCS | Mod: PBBFAC,,, | Performed by: INTERNAL MEDICINE

## 2022-05-19 PROCEDURE — 99214 OFFICE O/P EST MOD 30 MIN: CPT | Mod: S$GLB,,, | Performed by: INTERNAL MEDICINE

## 2022-05-19 PROCEDURE — 84681 ASSAY OF C-PEPTIDE: CPT | Performed by: INTERNAL MEDICINE

## 2022-05-19 PROCEDURE — 3044F HG A1C LEVEL LT 7.0%: CPT | Mod: CPTII,S$GLB,, | Performed by: INTERNAL MEDICINE

## 2022-05-19 PROCEDURE — 84443 ASSAY THYROID STIM HORMONE: CPT | Performed by: INTERNAL MEDICINE

## 2022-05-19 PROCEDURE — 3074F PR MOST RECENT SYSTOLIC BLOOD PRESSURE < 130 MM HG: ICD-10-PCS | Mod: CPTII,S$GLB,, | Performed by: INTERNAL MEDICINE

## 2022-05-19 PROCEDURE — 3079F PR MOST RECENT DIASTOLIC BLOOD PRESSURE 80-89 MM HG: ICD-10-PCS | Mod: CPTII,S$GLB,, | Performed by: INTERNAL MEDICINE

## 2022-05-19 PROCEDURE — 4010F ACE/ARB THERAPY RXD/TAKEN: CPT | Mod: CPTII,S$GLB,, | Performed by: INTERNAL MEDICINE

## 2022-05-19 PROCEDURE — 80053 COMPREHEN METABOLIC PANEL: CPT | Performed by: INTERNAL MEDICINE

## 2022-05-19 PROCEDURE — 1160F RVW MEDS BY RX/DR IN RCRD: CPT | Mod: CPTII,S$GLB,, | Performed by: INTERNAL MEDICINE

## 2022-05-19 PROCEDURE — 99999 PR PBB SHADOW E&M-EST. PATIENT-LVL III: CPT | Mod: PBBFAC,,, | Performed by: INTERNAL MEDICINE

## 2022-05-19 PROCEDURE — 1160F PR REVIEW ALL MEDS BY PRESCRIBER/CLIN PHARMACIST DOCUMENTED: ICD-10-PCS | Mod: CPTII,S$GLB,, | Performed by: INTERNAL MEDICINE

## 2022-05-19 PROCEDURE — 3078F DIAST BP <80 MM HG: CPT | Mod: CPTII,S$GLB,, | Performed by: INTERNAL MEDICINE

## 2022-05-19 PROCEDURE — 3079F DIAST BP 80-89 MM HG: CPT | Mod: CPTII,S$GLB,, | Performed by: INTERNAL MEDICINE

## 2022-05-19 PROCEDURE — 99203 OFFICE O/P NEW LOW 30 MIN: CPT | Mod: S$GLB,,, | Performed by: INTERNAL MEDICINE

## 2022-05-19 PROCEDURE — 83036 HEMOGLOBIN GLYCOSYLATED A1C: CPT | Performed by: INTERNAL MEDICINE

## 2022-05-19 PROCEDURE — 1159F MED LIST DOCD IN RCRD: CPT | Mod: CPTII,S$GLB,, | Performed by: INTERNAL MEDICINE

## 2022-05-19 PROCEDURE — 99999 PR PBB SHADOW E&M-EST. PATIENT-LVL V: CPT | Mod: PBBFAC,,, | Performed by: INTERNAL MEDICINE

## 2022-05-19 PROCEDURE — 36415 COLL VENOUS BLD VENIPUNCTURE: CPT | Performed by: INTERNAL MEDICINE

## 2022-05-19 PROCEDURE — 3078F PR MOST RECENT DIASTOLIC BLOOD PRESSURE < 80 MM HG: ICD-10-PCS | Mod: CPTII,S$GLB,, | Performed by: INTERNAL MEDICINE

## 2022-05-19 PROCEDURE — 99203 PR OFFICE/OUTPT VISIT, NEW, LEVL III, 30-44 MIN: ICD-10-PCS | Mod: S$GLB,,, | Performed by: INTERNAL MEDICINE

## 2022-05-19 PROCEDURE — 3288F FALL RISK ASSESSMENT DOCD: CPT | Mod: CPTII,S$GLB,, | Performed by: INTERNAL MEDICINE

## 2022-05-19 NOTE — PROGRESS NOTES
Subjective:   Patient ID: Cuate Fair is a 67 y.o. male  Chief complaint:   Chief Complaint   Patient presents with    Annual Exam     Was told his blood sugar level was really low    Stress     Lot of stress at work    Medication Problem     Thinks due to plaquenil; when it turns his head it looks like he see's rain/lightening in corner of eye       HPI    Here for annual exam     seen at urgent care over the weekend and low blood sugar 58 and had eaten that morning   Has appt for cards today     Seen in  for now feeling well   Had First episode of feeling faint and dizzy - left [employer] and went home and checked bp and elevated / 158/104 - however did not rest prior to checking   - son brought him to    - felt very weak in car and slumped over with watery eyes - does not recall specifically if lost consciousness   - no tongue biting or incontinence - no post ictal state   - left sided cp during this episode - no cp since then and was self resolving   - no sob or wheezing   - no leg swelling or edema   - no measured fevers   - occ skipping lunch and will eat dinner when home - no pattern of hypoglycemia sx with this   - no further episodes since  appt     Incredibly stressed at work and 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   Very active in [employer]   No vacation in 10 years     Hg 16, poct 58 and covid test negative   ekg stable     Reports feels like peripheral vision will see a shadow but then nothing there - no other changes in vision   - discussed that needs annual eye exam while on plaquenil and will f/u     EN, + MARY:  - less episodes of EN with plaquenil  - still on plaquenil and taking med consistently and no changes in dosing   - followed by rheum and derm and he did f/u with derm as above since lcv with me   Previously:   - quant gold negative  - cxr wnl   TSH wnl  - as above   - MARY positive 1:160 homogenous with neg reflex   - pt denies joint pain, rashes, morning stiffness    - does have hx of  "hep b that he was prev told that his body cleared   - hep b core ab +, hep b surf ab +  - hep b surf antigen neg and hep b e ant neg  - HIV neg    Thrombocytopenia and neutropenia prior to this dx - spep and immunifix wnl  immunoglob wnl  - seen by h/o 12/2019 - no indication for bm bx   - psa wnl  - no easy bruising or bleeding, hematuria or GIB  - No diarrhea, sore throat, fevers, joint swelling or pain or inc warmth or redness     HTN:   Reports checking intermittently   - taking bp meds this am - amlodipine 5mg and losartan 50   - repeat bp after rest and discussion above improved      HM:   shingrix  prevnar 20    Review of Systems      Objective:  Vitals:    05/19/22 0714 05/19/22 0818   BP: (!) 146/68 122/80   BP Location: Left arm    Patient Position: Sitting    Pulse: 70    SpO2: 98%    Weight: 75.3 kg (166 lb 0.1 oz)    Height: 5' 8" (1.727 m)      Body mass index is 25.24 kg/m².    Physical Exam  Vitals reviewed.   Constitutional:       Appearance: Normal appearance. He is well-developed.   HENT:      Head: Normocephalic and atraumatic.      Right Ear: Tympanic membrane, ear canal and external ear normal.      Left Ear: Tympanic membrane, ear canal and external ear normal.      Nose: Nose normal. No congestion.      Mouth/Throat:      Mouth: Mucous membranes are moist.      Pharynx: Oropharynx is clear. No oropharyngeal exudate.   Eyes:      Extraocular Movements: Extraocular movements intact.      Conjunctiva/sclera: Conjunctivae normal.   Neck:      Thyroid: No thyromegaly.   Cardiovascular:      Rate and Rhythm: Normal rate and regular rhythm.      Pulses: Normal pulses.      Heart sounds: Normal heart sounds.   Pulmonary:      Effort: Pulmonary effort is normal.      Breath sounds: Normal breath sounds.   Abdominal:      General: Bowel sounds are normal.      Palpations: Abdomen is soft.   Musculoskeletal:         General: No swelling or tenderness.      Cervical back: Neck supple.   Lymphadenopathy: "      Cervical: No cervical adenopathy.   Skin:     General: Skin is warm and dry.      Capillary Refill: Capillary refill takes less than 2 seconds.   Neurological:      General: No focal deficit present.      Mental Status: He is alert and oriented to person, place, and time.   Psychiatric:         Mood and Affect: Mood normal.         Behavior: Behavior normal.         Thought Content: Thought content normal.         Judgment: Judgment normal.         Assessment:  1. Annual physical exam    2. Encounter for medication monitoring    3. Vision changes    4. Screening for prostate cancer    5. Anxiety    6. Essential hypertension    7. B12 deficiency    8. Situational anxiety    9. Erythema nodosum        Plan:  Annual physical exam  -     Hemoglobin A1C; Future; Expected date: 05/19/2022  -     TSH; Future; Expected date: 05/19/2022  -     Lipid Panel; Future; Expected date: 05/19/2022  -     CBC Auto Differential; Future; Expected date: 05/19/2022  -     Comprehensive Metabolic Panel; Future; Expected date: 05/19/2022  -     INSULIN, RANDOM; Future; Expected date: 05/19/2022  -     C-PEPTIDE; Future; Expected date: 05/19/2022  -     PSA, Screening; Future; Expected date: 05/19/2022  -     Urinalysis, Reflex to Urine Culture Urine, Clean Catch; Future; Expected date: 05/19/2022    Encounter for medication monitoring  -     Ambulatory referral/consult to Optometry; Future; Expected date: 05/26/2022    Vision changes  -     Ambulatory referral/consult to Optometry; Future; Expected date: 05/26/2022    Screening for prostate cancer  -     PSA, Screening; Future; Expected date: 05/19/2022    Anxiety  -     Ambulatory referral/consult to Primary Care Behavioral Health (Non-Opioids); Future; Expected date: 05/26/2022  - start counseling   - take vacation - overdue for this!   - discussed role of controller med and he will defer for now     Essential hypertension  Stable - repeat at goal after rest   Cont meds   Low salt  diet   Graded exercise after cleared by cardiology     B12 deficiency  Cont suppl     Situational anxiety    Erythema nodosum  Cont plaquenil - schedule eye exam   F/u with rheum     Hypoglycemia:   - check labs below - do not skip meals   Inc protein to meals and snacks     Health Maintenance   Topic Date Due    Lipid Panel  12/15/2021    PROSTATE-SPECIFIC ANTIGEN  04/09/2022    TETANUS VACCINE  11/09/2028    Hepatitis C Screening  Completed

## 2022-05-19 NOTE — PROGRESS NOTES
"[facility]    Chief Complaint  Chief Complaint   Patient presents with    Dizziness       HPI:    Presents for cardiac evaluation.  This past weekend he was in [place] when he began to feel generalized weakness.  Did not feel like he was going to pass out.  Checked his blood pressure at home.  Found to be high.  So went to urgent care.  On the way to urgent care he reports losing all function." Slumped over.  Eyes watering.  Diaphoretic.  Never lost consciousness.  Currently he denies chest pain, dyspnea and palpitations associated with that episode.  Resolved while at Urgent Care without recurrence since.  He reports he is active at work and at home.  Repeatedly states that he is under a lot of stress.    Medications  Current Outpatient Medications   Medication Sig Dispense Refill    amLODIPine (NORVASC) 5 MG tablet Take 1 tablet (5 mg total) by mouth once daily. 90 tablet 3    atorvastatin (LIPITOR) 40 MG tablet TAKE 1 TABLET(40 MG) BY MOUTH EVERY DAY 90 tablet 0    cyanocobalamin (VITAMIN B-12) 1000 MCG tablet Take 1 tablet (1,000 mcg total) by mouth once daily.      hydrocortisone 2.5 % cream Apply topically 2 (two) times daily. 3.5 g 0    hydrOXYchloroQUINE (PLAQUENIL) 200 mg tablet 200 mg bid 180 tablet 4    ketoconazole (NIZORAL) 2 % cream Apply topically once daily. 15 g 0    losartan (COZAAR) 50 MG tablet TAKE 1 TABLET(50 MG) BY MOUTH EVERY DAY 90 tablet 3    sildenafiL (VIAGRA) 50 MG tablet Take 1 tablet (50 mg total) by mouth daily as needed for Erectile Dysfunction (take 30 min prior to sexual activity). 30 tablet 0     No current facility-administered medications for this visit.        History  Past Medical History:   Diagnosis Date    Cataract     Colon polyp     Glaucoma suspect      Past Surgical History:   Procedure Laterality Date    COLONOSCOPY      TONSILLECTOMY       Social History     Socioeconomic History    Marital status:    Occupational History    " Occupation: building engineers    Tobacco Use    Smoking status: Never Smoker    Smokeless tobacco: Never Used   Substance and Sexual Activity    Alcohol use: No    Drug use: No    Sexual activity: Yes     Partners: Male   Social History Narrative    From [place]     Walking daily - 2 mi per day     Family History   Problem Relation Age of Onset    Cancer Mother         breast cancer    Cataracts Mother     No Known Problems Father     No Known Problems Daughter     Hemophilia Son     No Known Problems Son     Cancer Brother         lung CA - related to work exposure    Melanoma Neg Hx     Glaucoma Neg Hx     Macular degeneration Neg Hx         Allergies  Review of patient's allergies indicates:   Allergen Reactions    Codeine        Review of Systems   Review of Systems   Constitutional: Positive for diaphoresis. Negative for malaise/fatigue, weight gain and weight loss.   Eyes: Negative for visual disturbance.   Cardiovascular: Negative for chest pain, claudication, cyanosis, dyspnea on exertion, irregular heartbeat, leg swelling, near-syncope, orthopnea, palpitations, paroxysmal nocturnal dyspnea and syncope.   Respiratory: Negative for cough, hemoptysis, shortness of breath, sleep disturbances due to breathing and wheezing.    Hematologic/Lymphatic: Negative for bleeding problem. Does not bruise/bleed easily.   Skin: Negative for poor wound healing.   Musculoskeletal: Negative for muscle cramps and myalgias.   Gastrointestinal: Negative for abdominal pain, anorexia, diarrhea, heartburn, hematemesis, hematochezia, melena, nausea and vomiting.   Genitourinary: Negative for hematuria and nocturia.   Neurological: Positive for weakness. Negative for excessive daytime sleepiness, dizziness, focal weakness and light-headedness.       Physical Exam  Vitals:    05/19/22 1448   BP: 118/64   Pulse: 66     Wt Readings from Last 1 Encounters:   05/19/22 75.3 kg (166 lb)     Physical Exam  Vitals and nursing  note reviewed.   Constitutional:       General: He is not in acute distress.     Appearance: He is not toxic-appearing or diaphoretic.   HENT:      Head: Normocephalic and atraumatic.      Mouth/Throat:      Lips: Pink.      Mouth: Mucous membranes are moist.   Eyes:      General: No scleral icterus.     Conjunctiva/sclera: Conjunctivae normal.   Neck:      Thyroid: No thyromegaly.      Vascular: No carotid bruit, hepatojugular reflux or JVD.      Trachea: Trachea normal.   Cardiovascular:      Rate and Rhythm: Normal rate and regular rhythm.  No extrasystoles are present.     Chest Wall: PMI is not displaced.      Pulses:           Carotid pulses are 2+ on the right side and 2+ on the left side.       Radial pulses are 2+ on the right side and 2+ on the left side.        Dorsalis pedis pulses are 2+ on the right side and 2+ on the left side.        Posterior tibial pulses are 2+ on the right side and 2+ on the left side.      Heart sounds: S1 normal and S2 normal. No murmur heard.    No friction rub. No S3 or S4 sounds.   Pulmonary:      Effort: Pulmonary effort is normal. No tachypnea, bradypnea, accessory muscle usage or respiratory distress.      Breath sounds: Normal breath sounds and air entry. No decreased breath sounds, wheezing, rhonchi or rales.   Abdominal:      General: Bowel sounds are normal. There is no distension or abdominal bruit.      Palpations: Abdomen is soft. There is no hepatomegaly, splenomegaly or pulsatile mass.      Tenderness: There is no abdominal tenderness.   Musculoskeletal:         General: No tenderness or deformity.      Right lower leg: No edema.      Left lower leg: No edema.   Skin:     General: Skin is warm and dry.      Capillary Refill: Capillary refill takes less than 2 seconds.      Coloration: Skin is not cyanotic or pale.      Nails: There is no clubbing.   Neurological:      General: No focal deficit present.      Mental Status: He is alert and oriented to person,  place, and time.   Psychiatric:         Attention and Perception: Attention normal.         Mood and Affect: Mood normal.         Speech: Speech normal.         Behavior: Behavior normal. Behavior is cooperative.         Labs  Lab Visit on 05/19/2022   Component Date Value Ref Range Status    Specimen UA 05/19/2022 Urine, Clean Catch   Final    Color, UA 05/19/2022 Yellow  Yellow, Straw, Cierra Final    Appearance, UA 05/19/2022 Clear  Clear Final    pH, UA 05/19/2022 7.0  5.0 - 8.0 Final    Specific Gravity, UA 05/19/2022 1.020  1.005 - 1.030 Final    Protein, UA 05/19/2022 Negative  Negative Final    Comment: Recommend a 24 hour urine protein or a urine   protein/creatinine ratio if globulin induced proteinuria is  clinically suspected.      Glucose, UA 05/19/2022 1+ (A) Negative Final    Ketones, UA 05/19/2022 Negative  Negative Final    Bilirubin (UA) 05/19/2022 Negative  Negative Final    Occult Blood UA 05/19/2022 Negative  Negative Final    Nitrite, UA 05/19/2022 Negative  Negative Final    Leukocytes, UA 05/19/2022 Negative  Negative Final    RBC, UA 05/19/2022 0  0 - 4 /hpf Final    WBC, UA 05/19/2022 0  0 - 5 /hpf Final    Bacteria 05/19/2022 Rare  None-Occ /hpf Final    Microscopic Comment 05/19/2022 SEE COMMENT   Final    Comment: Other formed elements not mentioned in the report are not   present in the microscopic examination.      Lab Visit on 05/19/2022   Component Date Value Ref Range Status    Hemoglobin A1C 05/19/2022 5.5  4.0 - 5.6 % Final    Comment: ADA Screening Guidelines:  5.7-6.4%  Consistent with prediabetes  >or=6.5%  Consistent with diabetes    High levels of fetal hemoglobin interfere with the HbA1C  assay. Heterozygous hemoglobin variants (HbS, HgC, etc)do  not significantly interfere with this assay.   However, presence of multiple variants may affect accuracy.      Estimated Avg Glucose 05/19/2022 111  68 - 131 mg/dL Final    TSH 05/19/2022 0.705  0.400 - 4.000  uIU/mL Final    Cholesterol 05/19/2022 137  120 - 199 mg/dL Final    Comment: The National Cholesterol Education Program (NCEP) has set the  following guidelines (reference ranges) for Cholesterol:  Optimal.....................<200 mg/dL  Borderline High.............200-239 mg/dL  High........................> or = 240 mg/dL      Triglycerides 05/19/2022 38  30 - 150 mg/dL Final    Comment: The National Cholesterol Education Program (NCEP) has set the  following guidelines (reference values) for triglycerides:  Normal......................<150 mg/dL  Borderline High.............150-199 mg/dL  High........................200-499 mg/dL      HDL 05/19/2022 55  40 - 75 mg/dL Final    Comment: The National Cholesterol Education Program (NCEP) has set the  following guidelines (reference values) for HDL Cholesterol:  Low...............<40 mg/dL  Optimal...........>60 mg/dL      LDL Cholesterol 05/19/2022 74.4  63.0 - 159.0 mg/dL Final    Comment: The National Cholesterol Education Program (NCEP) has set the  following guidelines (reference values) for LDL Cholesterol:  Optimal.......................<130 mg/dL  Borderline High...............130-159 mg/dL  High..........................160-189 mg/dL  Very High.....................>190 mg/dL      HDL/Cholesterol Ratio 05/19/2022 40.1  20.0 - 50.0 % Final    Total Cholesterol/HDL Ratio 05/19/2022 2.5  2.0 - 5.0 Final    Non-HDL Cholesterol 05/19/2022 82  mg/dL Final    Comment: Risk category and Non-HDL cholesterol goals:  Coronary heart disease (CHD)or equivalent (10-year risk of CHD >20%):  Non-HDL cholesterol goal     <130 mg/dL  Two or more CHD risk factors and 10-year risk of CHD <= 20%:  Non-HDL cholesterol goal     <160 mg/dL  0 to 1 CHD risk factor:  Non-HDL cholesterol goal     <190 mg/dL      WBC 05/19/2022 3.81 (A) 3.90 - 12.70 K/uL Final    RBC 05/19/2022 5.61  4.60 - 6.20 M/uL Final    Hemoglobin 05/19/2022 17.3  14.0 - 18.0 g/dL Final    Hematocrit  05/19/2022 50.4  40.0 - 54.0 % Final    MCV 05/19/2022 90  82 - 98 fL Final    MCH 05/19/2022 30.8  27.0 - 31.0 pg Final    MCHC 05/19/2022 34.3  32.0 - 36.0 g/dL Final    RDW 05/19/2022 12.4  11.5 - 14.5 % Final    Platelets 05/19/2022 158  150 - 450 K/uL Final    MPV 05/19/2022 10.5  9.2 - 12.9 fL Final    Immature Granulocytes 05/19/2022 0.0  0.0 - 0.5 % Final    Gran # (ANC) 05/19/2022 1.9  1.8 - 7.7 K/uL Final    Immature Grans (Abs) 05/19/2022 0.00  0.00 - 0.04 K/uL Final    Comment: Mild elevation in immature granulocytes is non specific and   can be seen in a variety of conditions including stress response,   acute inflammation, trauma and pregnancy. Correlation with other   laboratory and clinical findings is essential.      Lymph # 05/19/2022 1.2  1.0 - 4.8 K/uL Final    Mono # 05/19/2022 0.5  0.3 - 1.0 K/uL Final    Eos # 05/19/2022 0.2  0.0 - 0.5 K/uL Final    Baso # 05/19/2022 0.03  0.00 - 0.20 K/uL Final    nRBC 05/19/2022 0  0 /100 WBC Final    Gran % 05/19/2022 51.0  38.0 - 73.0 % Final    Lymph % 05/19/2022 32.0  18.0 - 48.0 % Final    Mono % 05/19/2022 12.3  4.0 - 15.0 % Final    Eosinophil % 05/19/2022 3.9  0.0 - 8.0 % Final    Basophil % 05/19/2022 0.8  0.0 - 1.9 % Final    Differential Method 05/19/2022 Automated   Final    Sodium 05/19/2022 141  136 - 145 mmol/L Final    Potassium 05/19/2022 4.2  3.5 - 5.1 mmol/L Final    Chloride 05/19/2022 106  95 - 110 mmol/L Final    CO2 05/19/2022 27  23 - 29 mmol/L Final    Glucose 05/19/2022 87  70 - 110 mg/dL Final    BUN 05/19/2022 19  8 - 23 mg/dL Final    Creatinine 05/19/2022 1.2  0.5 - 1.4 mg/dL Final    Calcium 05/19/2022 9.2  8.7 - 10.5 mg/dL Final    Total Protein 05/19/2022 7.5  6.0 - 8.4 g/dL Final    Albumin 05/19/2022 4.4  3.5 - 5.2 g/dL Final    Total Bilirubin 05/19/2022 0.7  0.1 - 1.0 mg/dL Final    Comment: For infants and newborns, interpretation of results should be based  on gestational age, weight and  in agreement with clinical  observations.    Premature Infant recommended reference ranges:  Up to 24 hours.............<8.0 mg/dL  Up to 48 hours............<12.0 mg/dL  3-5 days..................<15.0 mg/dL  6-29 days.................<15.0 mg/dL      Alkaline Phosphatase 05/19/2022 67  55 - 135 U/L Final    AST 05/19/2022 26  10 - 40 U/L Final    ALT 05/19/2022 20  10 - 44 U/L Final    Anion Gap 05/19/2022 8  8 - 16 mmol/L Final    eGFR if African American 05/19/2022 >60  >60 mL/min/1.73 m^2 Final    eGFR if non African American 05/19/2022 >60  >60 mL/min/1.73 m^2 Final    Comment: Calculation used to obtain the estimated glomerular filtration  rate (eGFR) is the CKD-EPI equation.       Insulin Collection Interval 05/19/2022 unknown   Final    C-Peptide 05/19/2022 3.47  0.78 - 5.19 ng/mL Final   Office Visit on 05/15/2022   Component Date Value Ref Range Status    POC Rapid COVID 05/15/2022 Negative  Negative Final     Acceptable 05/15/2022 Yes   Final    POC Glucose 05/15/2022 58 (A) 70 - 110 MG/DL Final    Hemoglobin 05/15/2022 16.8  13.0 - 17.0 g/dL Final     Acceptable 05/15/2022 Yes   Final       Imaging  XR CHEST PA AND LATERAL    Result Date: 5/15/2022  EXAMINATION: XR CHEST PA AND LATERAL CLINICAL HISTORY: Chest pain, unspecified TECHNIQUE: PA and lateral views of the chest were performed. COMPARISON: 03/03/2020 FINDINGS: The cardiomediastinal silhouette is not enlarged.  There is no pleural effusion.  The trachea is midline.  The lungs are symmetrically expanded bilaterally without evidence of acute parenchymal process. No large focal consolidation seen.  There may be a calcified granuloma projected over the left midlung zone.  There is no pneumothorax.  The osseous structures are remarkable for degenerative changes..     1. No acute cardiopulmonary process. Electronically signed by: Erick Andres MD Date:    05/15/2022 Time:    12:16      Assessment  1.  Hypertensive heart disease, unspecified whether heart failure present  Doubt cardiac etiology for events of this past weekend.  - Echo; Future      Plan and Discussion    Workup as ordered above with further planning based on those results.    The 10-year ASCVD risk score (Crumbhavya SOLITARIO Jr., et al., 2013) is: 12.2%    Values used to calculate the score:      Age: 67 years      Sex: Male      Is Non- : Yes      Diabetic: No      Tobacco smoker: No      Systolic Blood Pressure: 118 mmHg      Is BP treated: Yes      HDL Cholesterol: 55 mg/dL      Total Cholesterol: 137 mg/dL     Follow Up  No follow-ups on file.      Tyson Posada MD

## 2022-05-25 ENCOUNTER — HOSPITAL ENCOUNTER (OUTPATIENT)
Dept: CARDIOLOGY | Facility: OTHER | Age: 67
Discharge: HOME | End: 2022-05-25
Attending: INTERNAL MEDICINE
Payer: COMMERCIAL

## 2022-05-25 VITALS
DIASTOLIC BLOOD PRESSURE: 64 MMHG | WEIGHT: 166 LBS | HEIGHT: 68 IN | SYSTOLIC BLOOD PRESSURE: 118 MMHG | BODY MASS INDEX: 25.16 KG/M2

## 2022-05-25 DIAGNOSIS — I11.9 HYPERTENSIVE HEART DISEASE, UNSPECIFIED WHETHER HEART FAILURE PRESENT: ICD-10-CM

## 2022-05-25 LAB
ASCENDING AORTA: 2.65 CM
AV INDEX (PROSTH): 0.65
AV MEAN GRADIENT: 5 MMHG
AV PEAK GRADIENT: 9 MMHG
AV VALVE AREA: 2.4 CM2
AV VELOCITY RATIO: 0.86
BSA FOR ECHO PROCEDURE: 1.9 M2
CV ECHO LV RWT: 0.55 CM
DOP CALC AO PEAK VEL: 1.47 M/S
DOP CALC AO VTI: 26.68 CM
DOP CALC LVOT AREA: 3.7 CM2
DOP CALC LVOT DIAMETER: 2.17 CM
DOP CALC LVOT PEAK VEL: 1.26 M/S
DOP CALC LVOT STROKE VOLUME: 63.91 CM3
DOP CALCLVOT PEAK VEL VTI: 17.29 CM
E WAVE DECELERATION TIME: 255.05 MSEC
E/A RATIO: 0.71
E/E' RATIO: 6.75 M/S
ECHO LV POSTERIOR WALL: 1.22 CM (ref 0.6–1.1)
EJECTION FRACTION: 70 %
FRACTIONAL SHORTENING: 43 % (ref 28–44)
INTERVENTRICULAR SEPTUM: 0.96 CM (ref 0.6–1.1)
IVRT: 186.49 MSEC
LA MAJOR: 6.22 CM
LA MINOR: 5.11 CM
LA WIDTH: 3.17 CM
LEFT ATRIUM SIZE: 4.05 CM
LEFT ATRIUM VOLUME INDEX MOD: 19 ML/M2
LEFT ATRIUM VOLUME INDEX: 32.4 ML/M2
LEFT ATRIUM VOLUME MOD: 36 CM3
LEFT ATRIUM VOLUME: 61.23 CM3
LEFT INTERNAL DIMENSION IN SYSTOLE: 2.55 CM (ref 2.1–4)
LEFT VENTRICLE DIASTOLIC VOLUME INDEX: 48.16 ML/M2
LEFT VENTRICLE DIASTOLIC VOLUME: 91.03 ML
LEFT VENTRICLE MASS INDEX: 90 G/M2
LEFT VENTRICLE SYSTOLIC VOLUME INDEX: 12.5 ML/M2
LEFT VENTRICLE SYSTOLIC VOLUME: 23.55 ML
LEFT VENTRICULAR INTERNAL DIMENSION IN DIASTOLE: 4.47 CM (ref 3.5–6)
LEFT VENTRICULAR MASS: 170.96 G
LV LATERAL E/E' RATIO: 6 M/S
LV SEPTAL E/E' RATIO: 7.71 M/S
MV A" WAVE DURATION": 18.27 MSEC
MV PEAK A VEL: 0.76 M/S
MV PEAK E VEL: 0.54 M/S
MV STENOSIS PRESSURE HALF TIME: 73.96 MS
MV VALVE AREA P 1/2 METHOD: 2.97 CM2
PISA MRMAX VEL: 0.02 M/S
PISA TR MAX VEL: 2.48 M/S
PULM VEIN S/D RATIO: 1.16
PV PEAK D VEL: 0.44 M/S
PV PEAK S VEL: 0.51 M/S
PV PEAK VELOCITY: 0.98 CM/S
RA MAJOR: 5.36 CM
RA PRESSURE: 3 MMHG
RA WIDTH: 3.55 CM
RIGHT VENTRICULAR END-DIASTOLIC DIMENSION: 3.64 CM
SINUS: 3.31 CM
STJ: 2.5 CM
TDI LATERAL: 0.09 M/S
TDI SEPTAL: 0.07 M/S
TDI: 0.08 M/S
TR MAX PG: 25 MMHG
TRICUSPID ANNULAR PLANE SYSTOLIC EXCURSION: 2.05 CM
TV REST PULMONARY ARTERY PRESSURE: 28 MMHG

## 2022-05-25 PROCEDURE — 93306 TTE W/DOPPLER COMPLETE: CPT | Mod: 26,,, | Performed by: INTERNAL MEDICINE

## 2022-05-25 PROCEDURE — 93306 ECHO (CUPID ONLY): ICD-10-PCS | Mod: 26,,, | Performed by: INTERNAL MEDICINE

## 2022-05-25 PROCEDURE — 93306 TTE W/DOPPLER COMPLETE: CPT

## 2022-05-27 ENCOUNTER — TELEPHONE (OUTPATIENT)
Dept: INTERNAL MEDICINE | Facility: CLINIC | Age: 67
End: 2022-05-27
Payer: COMMERCIAL

## 2022-05-27 DIAGNOSIS — R35.1 NOCTURIA: Primary | ICD-10-CM

## 2022-05-27 DIAGNOSIS — R81 GLUCOSURIA: ICD-10-CM

## 2022-05-27 NOTE — TELEPHONE ENCOUNTER
Spoke to Mr. Fair and informed him per Dr. Beth that Pt notify pt that his labs returned - sugar and insulin levels were normal.   Your kidneys, liver, and electrolytes are normal.  Your A1c which is a measure of your average blood sugar over a three month period is normal and negative for prediabetes/diabetes.  Your thyroid lab is in normal range.   Cholesterol is in good range and improved from last year - please continue current medication  Blood counts are normal     PSA level which screens for prostate cancer was normal - it did increased slightly which this level does as one ages - I recommend out of an abundance of caution that we repeat it in 6 months      Urinalysis was normal except it showed that sugar was present - I recommend repeating his urinalysis in 2-4 weeks at his convenience to determine if this is a persistent finding - if persistent then I will recommend that he follow up with a nephrologist to review results further   - if resolved then does not need to follow up with specialist     Patient states understanding and confirmed appt date and time.

## 2022-05-27 NOTE — TELEPHONE ENCOUNTER
Pt notify pt that his labs returned - sugar and insulin levels were normal.   Your kidneys, liver, and electrolytes are normal.  Your A1c which is a measure of your average blood sugar over a three month period is normal and negative for prediabetes/diabetes.  Your thyroid lab is in normal range.   Cholesterol is in good range and improved from last year - please continue current medication  Blood counts are normal    PSA level which screens for prostate cancer was normal - it did increased slightly which this level does as one ages - I recommend out of an abundance of caution that we repeat it in 6 months     Urinalysis was normal except it showed that sugar was present - I recommend repeating his urinalysis in 2-4 weeks at his convenience to determine if this is a persistent finding - if persistent then I will recommend that he follow up with a nephrologist to review results further   - if resolved then does not need to follow up with specialist     - please schedule urinalysis as clean catch in 2-4 weeks at his convenience   Please schedule psa in 6 months

## 2022-06-13 ENCOUNTER — TELEPHONE (OUTPATIENT)
Dept: OPHTHALMOLOGY | Facility: CLINIC | Age: 67
End: 2022-06-13
Payer: COMMERCIAL

## 2022-06-15 ENCOUNTER — LAB VISIT (OUTPATIENT)
Dept: LAB | Facility: OTHER | Age: 67
End: 2022-06-15
Attending: INTERNAL MEDICINE
Payer: COMMERCIAL

## 2022-06-15 DIAGNOSIS — R81 GLUCOSURIA: ICD-10-CM

## 2022-06-15 LAB
BILIRUB UR QL STRIP: NEGATIVE
CLARITY UR REFRACT.AUTO: ABNORMAL
COLOR UR AUTO: YELLOW
GLUCOSE UR QL STRIP: NEGATIVE
HGB UR QL STRIP: NEGATIVE
KETONES UR QL STRIP: NEGATIVE
LEUKOCYTE ESTERASE UR QL STRIP: NEGATIVE
NITRITE UR QL STRIP: NEGATIVE
PH UR STRIP: 5 [PH] (ref 5–8)
PROT UR QL STRIP: NEGATIVE
SP GR UR STRIP: 1.03 (ref 1–1.03)
URN SPEC COLLECT METH UR: ABNORMAL

## 2022-06-15 PROCEDURE — 81003 URINALYSIS AUTO W/O SCOPE: CPT | Performed by: INTERNAL MEDICINE

## 2022-06-16 ENCOUNTER — TELEPHONE (OUTPATIENT)
Dept: INTERNAL MEDICINE | Facility: CLINIC | Age: 67
End: 2022-06-16
Payer: COMMERCIAL

## 2022-06-17 NOTE — TELEPHONE ENCOUNTER
Spoke to  Marv and informed him that per Dr. Beth his urinalysis is normal.  Patient states understanding.

## 2022-06-19 NOTE — PROGRESS NOTES
Subjective:       Patient ID: Cuate Fair is a 67 y.o. male.    Chief Complaint: Other (Left ear blocked and ringing.)    He is a new patient for me here today complaining of left ear blockage and ringing on and off past few weeks and now persistent.  Similar episodes in the past attributed to cerumen impaction and symptoms cleared with CI removal.  No pain, drainage, or other otologic complaints.  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  No other prior otologic history.  No nasal or throat or other otolaryngologic complaints.          Review of Systems     Constitutional: Negative for fever.      HENT: Positive for hearing loss and ringing in the ears.  Negative for ear discharge, ear pain, sore throat and stuffy nose.      Respiratory:  Negative for cough and shortness of breath.      Cardiovascular:  Negative for chest pain.     Gastrointestinal:  Positive for acid reflux.     Neurological: Negative for dizziness.      Hematologic: Negative for swollen glands.                Objective:        Vitals:    05/02/22 1046   BP: 115/67   Pulse: 64   Temp: 97.9 °F (36.6 °C)     Body mass index is 25.44 kg/m².  Physical Exam  Constitutional:       General: He is not in acute distress.     Appearance: He is well-developed.   HENT:      Head: Normocephalic and atraumatic.      Right Ear: Tympanic membrane, ear canal and external ear normal.      Left Ear: Tympanic membrane and external ear normal. There is impacted cerumen.      Nose: No nasal deformity, mucosal edema or rhinorrhea.      Mouth/Throat:      Mouth: Mucous membranes are moist.      Pharynx: No pharyngeal swelling, oropharyngeal exudate or posterior oropharyngeal erythema.   Neck:      Trachea: Phonation normal.   Pulmonary:      Effort: Pulmonary effort is normal. No respiratory distress.   Musculoskeletal:      Cervical back: Neck supple.   Lymphadenopathy:      Cervical: No cervical  adenopathy.   Skin:     General: Skin is warm and dry.   Neurological:      Mental Status: He is alert and oriented to person, place, and time.   Psychiatric:         Speech: Speech normal.         Behavior: Behavior normal.         Tests / Results:                Assessment:       1. Stuffy ears, left    2. Impacted cerumen of left ear    3. Tinnitus, left    4. Sensorineural hearing loss, bilateral    5. History of exposure to noise        Plan:       Left cerumen impaction was cleared with micro instrumentation and tolerated well and otherwise canal andTM within nomail limits AS.  Above audiogram was subsequently performed and results reviewed.  Defers amplification at this.  Hearing protection and ear care reviewed.  Recheck one year unless change or problems prior.

## 2022-06-23 ENCOUNTER — OFFICE VISIT (OUTPATIENT)
Dept: INTERNAL MEDICINE | Facility: CLINIC | Age: 67
End: 2022-06-23
Attending: INTERNAL MEDICINE
Payer: COMMERCIAL

## 2022-06-23 VITALS
HEART RATE: 84 BPM | HEIGHT: 68 IN | SYSTOLIC BLOOD PRESSURE: 115 MMHG | DIASTOLIC BLOOD PRESSURE: 80 MMHG | WEIGHT: 164.44 LBS | BODY MASS INDEX: 24.92 KG/M2 | OXYGEN SATURATION: 98 %

## 2022-06-23 DIAGNOSIS — Z71.2 ENCOUNTER TO DISCUSS TEST RESULTS: ICD-10-CM

## 2022-06-23 DIAGNOSIS — L30.9 DERMATITIS: ICD-10-CM

## 2022-06-23 DIAGNOSIS — I10 ESSENTIAL HYPERTENSION: Primary | ICD-10-CM

## 2022-06-23 PROCEDURE — 99214 OFFICE O/P EST MOD 30 MIN: CPT | Mod: S$GLB,,, | Performed by: INTERNAL MEDICINE

## 2022-06-23 PROCEDURE — 3288F PR FALLS RISK ASSESSMENT DOCUMENTED: ICD-10-PCS | Mod: CPTII,S$GLB,, | Performed by: INTERNAL MEDICINE

## 2022-06-23 PROCEDURE — 4010F ACE/ARB THERAPY RXD/TAKEN: CPT | Mod: CPTII,S$GLB,, | Performed by: INTERNAL MEDICINE

## 2022-06-23 PROCEDURE — 3074F SYST BP LT 130 MM HG: CPT | Mod: CPTII,S$GLB,, | Performed by: INTERNAL MEDICINE

## 2022-06-23 PROCEDURE — 3079F DIAST BP 80-89 MM HG: CPT | Mod: CPTII,S$GLB,, | Performed by: INTERNAL MEDICINE

## 2022-06-23 PROCEDURE — 1160F PR REVIEW ALL MEDS BY PRESCRIBER/CLIN PHARMACIST DOCUMENTED: ICD-10-PCS | Mod: CPTII,S$GLB,, | Performed by: INTERNAL MEDICINE

## 2022-06-23 PROCEDURE — 3008F BODY MASS INDEX DOCD: CPT | Mod: CPTII,S$GLB,, | Performed by: INTERNAL MEDICINE

## 2022-06-23 PROCEDURE — 1159F MED LIST DOCD IN RCRD: CPT | Mod: CPTII,S$GLB,, | Performed by: INTERNAL MEDICINE

## 2022-06-23 PROCEDURE — 1159F PR MEDICATION LIST DOCUMENTED IN MEDICAL RECORD: ICD-10-PCS | Mod: CPTII,S$GLB,, | Performed by: INTERNAL MEDICINE

## 2022-06-23 PROCEDURE — 99214 PR OFFICE/OUTPT VISIT, EST, LEVL IV, 30-39 MIN: ICD-10-PCS | Mod: S$GLB,,, | Performed by: INTERNAL MEDICINE

## 2022-06-23 PROCEDURE — 1101F PR PT FALLS ASSESS DOC 0-1 FALLS W/OUT INJ PAST YR: ICD-10-PCS | Mod: CPTII,S$GLB,, | Performed by: INTERNAL MEDICINE

## 2022-06-23 PROCEDURE — 1160F RVW MEDS BY RX/DR IN RCRD: CPT | Mod: CPTII,S$GLB,, | Performed by: INTERNAL MEDICINE

## 2022-06-23 PROCEDURE — 3288F FALL RISK ASSESSMENT DOCD: CPT | Mod: CPTII,S$GLB,, | Performed by: INTERNAL MEDICINE

## 2022-06-23 PROCEDURE — 3008F PR BODY MASS INDEX (BMI) DOCUMENTED: ICD-10-PCS | Mod: CPTII,S$GLB,, | Performed by: INTERNAL MEDICINE

## 2022-06-23 PROCEDURE — 3074F PR MOST RECENT SYSTOLIC BLOOD PRESSURE < 130 MM HG: ICD-10-PCS | Mod: CPTII,S$GLB,, | Performed by: INTERNAL MEDICINE

## 2022-06-23 PROCEDURE — 4010F PR ACE/ARB THEARPY RXD/TAKEN: ICD-10-PCS | Mod: CPTII,S$GLB,, | Performed by: INTERNAL MEDICINE

## 2022-06-23 PROCEDURE — 3044F PR MOST RECENT HEMOGLOBIN A1C LEVEL <7.0%: ICD-10-PCS | Mod: CPTII,S$GLB,, | Performed by: INTERNAL MEDICINE

## 2022-06-23 PROCEDURE — 3079F PR MOST RECENT DIASTOLIC BLOOD PRESSURE 80-89 MM HG: ICD-10-PCS | Mod: CPTII,S$GLB,, | Performed by: INTERNAL MEDICINE

## 2022-06-23 PROCEDURE — 99999 PR PBB SHADOW E&M-EST. PATIENT-LVL III: ICD-10-PCS | Mod: PBBFAC,,, | Performed by: INTERNAL MEDICINE

## 2022-06-23 PROCEDURE — 3044F HG A1C LEVEL LT 7.0%: CPT | Mod: CPTII,S$GLB,, | Performed by: INTERNAL MEDICINE

## 2022-06-23 PROCEDURE — 1101F PT FALLS ASSESS-DOCD LE1/YR: CPT | Mod: CPTII,S$GLB,, | Performed by: INTERNAL MEDICINE

## 2022-06-23 PROCEDURE — 99999 PR PBB SHADOW E&M-EST. PATIENT-LVL III: CPT | Mod: PBBFAC,,, | Performed by: INTERNAL MEDICINE

## 2022-06-23 RX ORDER — TRIAMCINOLONE ACETONIDE 1 MG/G
CREAM TOPICAL 2 TIMES DAILY
Qty: 15 G | Refills: 1 | Status: SHIPPED | OUTPATIENT
Start: 2022-06-23 | End: 2023-12-19

## 2022-06-23 NOTE — PROGRESS NOTES
"Subjective:   Patient ID: Cuate Fair is a 67 y.o. male  Chief complaint:   Chief Complaint   Patient presents with    Hypertension     F/u       HPI    Here for follow up and lab review     HTN: bp controlled on amlodipine and losartan     No further episodes of low sugar or shakiness   Reviewed labs - bg wnl, a1c wnl, insulin normal and repeat ua wnl after first was wnl x for presence of sugar    - still skipping lunch   - discussed obtaining otc glucometer if feels like bg is dropping in future     - seen by cards 5/19/2022  - echo ordered and normal ef - grade 1 amor dysfunction     He and is wife are going up to [place] for the weekend to visit with family and decompress     Noticed irritation and thickened peeling skin at times on palms of hands     Review of Systems      Objective:  Vitals:    06/23/22 1127   BP: 115/80   BP Location: Left arm   Patient Position: Sitting   Pulse: 84   SpO2: 98%   Weight: 74.6 kg (164 lb 7.4 oz)   Height: 5' 8" (1.727 m)     Body mass index is 25.01 kg/m².    Physical Exam  Vitals reviewed.   Constitutional:       Appearance: He is well-developed.   HENT:      Head: Normocephalic and atraumatic.   Eyes:      Extraocular Movements: Extraocular movements intact.      Conjunctiva/sclera: Conjunctivae normal.   Cardiovascular:      Rate and Rhythm: Normal rate and regular rhythm.      Pulses: Normal pulses.      Heart sounds: Normal heart sounds.   Pulmonary:      Effort: Pulmonary effort is normal.      Breath sounds: Normal breath sounds.   Abdominal:      General: Bowel sounds are normal.      Palpations: Abdomen is soft.   Musculoskeletal:         General: No swelling or tenderness.      Cervical back: Neck supple.   Lymphadenopathy:      Cervical: No cervical adenopathy.   Skin:     General: Skin is warm and dry.      Comments: Thickened skin on palmar aspect of hands - no fluctuance, redness or drainage    Neurological:      Mental Status: He is alert and " oriented to person, place, and time.   Psychiatric:         Behavior: Behavior normal.         Thought Content: Thought content normal.         Judgment: Judgment normal.         Assessment:  1. Essential hypertension    2. Dermatitis    3. Encounter to discuss test results        Plan:  Cuate was seen today for hypertension.    Diagnoses and all orders for this visit:    Essential hypertension  Controlled, cont meds   Low salt diet   Graded exercise prog    Dermatitis  -     triamcinolone acetonide 0.1% (KENALOG) 0.1 % cream; Apply topically 2 (two) times daily. for 10 days  If not improved then will f/u with derm     Encounter to discuss test results  Reviewed labs - no recurrence   Encourage exercise, meditation to help with stress magmt   If he reconsiders a ssri or counseling then will let me know   rtc prompts given     Health Maintenance   Topic Date Due    PROSTATE-SPECIFIC ANTIGEN  05/19/2023    Lipid Panel  05/19/2023    TETANUS VACCINE  11/09/2028    Hepatitis C Screening  Completed

## 2022-06-24 DIAGNOSIS — E78.5 HYPERLIPIDEMIA, UNSPECIFIED HYPERLIPIDEMIA TYPE: ICD-10-CM

## 2022-06-24 RX ORDER — ATORVASTATIN CALCIUM 40 MG/1
TABLET, FILM COATED ORAL
Qty: 90 TABLET | Refills: 3 | Status: SHIPPED | OUTPATIENT
Start: 2022-06-24 | End: 2023-07-07 | Stop reason: SDUPTHER

## 2022-06-24 NOTE — TELEPHONE ENCOUNTER
Refill Decision Note   Cuate Fair  is requesting a refill authorization.  Brief Assessment and Rationale for Refill:  Approve     Medication Therapy Plan:       Medication Reconciliation Completed: No   Comments:     No Care Gaps recommended.     Note composed:10:10 AM 06/24/2022

## 2022-06-24 NOTE — TELEPHONE ENCOUNTER
No new care gaps identified.  [facility] Embedded Care Gaps. Reference number: [id number]. 6/24/2022   3:56:06 AM LEDYT

## 2022-07-07 DIAGNOSIS — N52.9 ERECTILE DYSFUNCTION, UNSPECIFIED ERECTILE DYSFUNCTION TYPE: ICD-10-CM

## 2022-07-07 RX ORDER — SILDENAFIL 50 MG/1
50 TABLET, FILM COATED ORAL DAILY PRN
Qty: 30 TABLET | Refills: 0 | Status: SHIPPED | OUTPATIENT
Start: 2022-07-07 | End: 2023-09-05 | Stop reason: SDUPTHER

## 2022-07-07 NOTE — TELEPHONE ENCOUNTER
----- Message from Chaitanya Rider sent at 7/7/2022 10:41 AM CDT -----  Type: RX Refill Request    Who Called: self    Have you contacted your pharmacy:no    Refill or New Rx:refill    RX Name and Strength:sildenafiL (VIAGRA) 50 MG tablet    Preferred Pharmacy with phone number:[id number] [address] AT [facility] [address]   Phone:  [phone]  Fax:  [phone]    Local or Mail Order:local    Ordering Provider: RASHAWN Sheldon    Would the patient rather a call back or a response via My [facility]? call    Best Call Back Number:[phone] (M)

## 2022-08-19 ENCOUNTER — IMMUNIZATION (OUTPATIENT)
Dept: PHARMACY | Facility: CLINIC | Age: 67
End: 2022-08-19
Payer: COMMERCIAL

## 2022-08-19 DIAGNOSIS — Z23 NEED FOR VACCINATION: Primary | ICD-10-CM

## 2022-11-22 ENCOUNTER — LAB VISIT (OUTPATIENT)
Dept: LAB | Facility: OTHER | Age: 67
End: 2022-11-22
Attending: INTERNAL MEDICINE
Payer: COMMERCIAL

## 2022-11-22 DIAGNOSIS — R35.1 NOCTURIA: ICD-10-CM

## 2022-11-22 LAB
PROSTATE SPECIFIC ANTIGEN, TOTAL: 1.7 NG/ML (ref 0–4)
PSA FREE MFR SERPL: 32.35 %
PSA FREE SERPL-MCNC: 0.55 NG/ML (ref 0–1.5)

## 2022-11-22 PROCEDURE — 84153 ASSAY OF PSA TOTAL: CPT | Performed by: INTERNAL MEDICINE

## 2022-11-22 PROCEDURE — 36415 COLL VENOUS BLD VENIPUNCTURE: CPT | Performed by: INTERNAL MEDICINE

## 2022-11-22 PROCEDURE — 84154 ASSAY OF PSA FREE: CPT | Performed by: INTERNAL MEDICINE

## 2022-11-29 ENCOUNTER — TELEPHONE (OUTPATIENT)
Dept: INTERNAL MEDICINE | Facility: CLINIC | Age: 67
End: 2022-11-29
Payer: COMMERCIAL

## 2022-11-29 NOTE — TELEPHONE ENCOUNTER
"I call pt and inform him "Please let pt know that his psa level which screens for prostate cancer decreased from his prior level - good news.  we will continue to monitor his psa level annually to monitor for stability - next will be due in June." He is overjoyed of this news.   "

## 2022-11-29 NOTE — TELEPHONE ENCOUNTER
----- Message from Carolyne Beth MD sent at 11/28/2022  8:09 PM CST -----  Please let pt know that his psa level which screens for prostate cancer decreased from his prior level - good news.  we will continue to monitor his psa level annually to monitor for stability - next will be due in June

## 2022-11-29 NOTE — PROGRESS NOTES
Please let pt know that his psa level which screens for prostate cancer decreased from his prior level - good news.  we will continue to monitor his psa level annually to monitor for stability - next will be due in Stac[place]

## 2022-12-19 ENCOUNTER — OFFICE VISIT (OUTPATIENT)
Dept: OPTOMETRY | Facility: CLINIC | Age: 67
End: 2022-12-19
Attending: INTERNAL MEDICINE
Payer: COMMERCIAL

## 2022-12-19 DIAGNOSIS — H52.4 PRESBYOPIA: ICD-10-CM

## 2022-12-19 DIAGNOSIS — Z79.899 ENCOUNTER FOR LONG-TERM (CURRENT) USE OF HIGH-RISK MEDICATION: Primary | ICD-10-CM

## 2022-12-19 DIAGNOSIS — H40.013 OPEN ANGLE WITH BORDERLINE FINDINGS OF BOTH EYES: ICD-10-CM

## 2022-12-19 DIAGNOSIS — H25.13 NUCLEAR SCLEROSIS OF BOTH EYES: ICD-10-CM

## 2022-12-19 DIAGNOSIS — L52 ERYTHEMA NODOSUM: ICD-10-CM

## 2022-12-19 PROCEDURE — 92083 HUMPHREY VISUAL FIELD - OU - BOTH EYES: ICD-10-PCS | Mod: S$GLB,,, | Performed by: OPTOMETRIST

## 2022-12-19 PROCEDURE — 1159F PR MEDICATION LIST DOCUMENTED IN MEDICAL RECORD: ICD-10-PCS | Mod: CPTII,S$GLB,, | Performed by: OPTOMETRIST

## 2022-12-19 PROCEDURE — 99999 PR PBB SHADOW E&M-EST. PATIENT-LVL III: ICD-10-PCS | Mod: PBBFAC,,, | Performed by: OPTOMETRIST

## 2022-12-19 PROCEDURE — 92014 COMPRE OPH EXAM EST PT 1/>: CPT | Mod: S$GLB,,, | Performed by: OPTOMETRIST

## 2022-12-19 PROCEDURE — 3044F HG A1C LEVEL LT 7.0%: CPT | Mod: CPTII,S$GLB,, | Performed by: OPTOMETRIST

## 2022-12-19 PROCEDURE — 4010F ACE/ARB THERAPY RXD/TAKEN: CPT | Mod: CPTII,S$GLB,, | Performed by: OPTOMETRIST

## 2022-12-19 PROCEDURE — 92134 POSTERIOR SEGMENT OCT RETINA (OCULAR COHERENCE TOMOGRAPHY)-BOTH EYES: ICD-10-PCS | Mod: S$GLB,,, | Performed by: OPTOMETRIST

## 2022-12-19 PROCEDURE — 3288F FALL RISK ASSESSMENT DOCD: CPT | Mod: CPTII,S$GLB,, | Performed by: OPTOMETRIST

## 2022-12-19 PROCEDURE — 3288F PR FALLS RISK ASSESSMENT DOCUMENTED: ICD-10-PCS | Mod: CPTII,S$GLB,, | Performed by: OPTOMETRIST

## 2022-12-19 PROCEDURE — 1101F PT FALLS ASSESS-DOCD LE1/YR: CPT | Mod: CPTII,S$GLB,, | Performed by: OPTOMETRIST

## 2022-12-19 PROCEDURE — 4010F PR ACE/ARB THEARPY RXD/TAKEN: ICD-10-PCS | Mod: CPTII,S$GLB,, | Performed by: OPTOMETRIST

## 2022-12-19 PROCEDURE — 99999 PR PBB SHADOW E&M-EST. PATIENT-LVL III: CPT | Mod: PBBFAC,,, | Performed by: OPTOMETRIST

## 2022-12-19 PROCEDURE — 1159F MED LIST DOCD IN RCRD: CPT | Mod: CPTII,S$GLB,, | Performed by: OPTOMETRIST

## 2022-12-19 PROCEDURE — 1101F PR PT FALLS ASSESS DOC 0-1 FALLS W/OUT INJ PAST YR: ICD-10-PCS | Mod: CPTII,S$GLB,, | Performed by: OPTOMETRIST

## 2022-12-19 PROCEDURE — 3044F PR MOST RECENT HEMOGLOBIN A1C LEVEL <7.0%: ICD-10-PCS | Mod: CPTII,S$GLB,, | Performed by: OPTOMETRIST

## 2022-12-19 PROCEDURE — 92014 PR EYE EXAM, EST PATIENT,COMPREHESV: ICD-10-PCS | Mod: S$GLB,,, | Performed by: OPTOMETRIST

## 2022-12-19 PROCEDURE — 92083 EXTENDED VISUAL FIELD XM: CPT | Mod: S$GLB,,, | Performed by: OPTOMETRIST

## 2022-12-19 PROCEDURE — 92134 CPTRZ OPH DX IMG PST SGM RTA: CPT | Mod: S$GLB,,, | Performed by: OPTOMETRIST

## 2022-12-19 NOTE — PROGRESS NOTES
"HPI    Last eye exam was 3/17/21 with Dr. Henry.  Patient states seeing "snowflakes" in peripheral vision when turning   quickly to the right for the past few months.  Patient denies diplopia, headaches, flashes, and pain.    Plaquenil 200 mg Twice Daily PO    Last edited by Jackie Shea MA on 12/19/2022  2:18 PM.            Assessment /Plan     For exam results, see Encounter Report.    Encounter for long-term (current) use of high-risk medication  -     Zhang Visual Field - OU - Extended - Both Eyes  -     OCT- Retina    Erythema nodosum  -     Ambulatory referral/consult to Optometry  -     Zhang Visual Field - OU - Extended - Both Eyes  -     OCT- Retina    Nuclear sclerosis of both eyes    Open angle with borderline findings of both eyes    Presbyopia          1-2.  All testing normal OU-no retinopathy.  Monitor yearly.  3. Educated on cataracts and affects on vision.  Patient happy with vision.  4. Due to increased c/d ratio OU.  IOPs stable.  Past testing normal OU.  monitor yearly with HVF and OCT.    5.   Continue w/ current rx.  Retina flat and intact OU--no holes, tears, breaks, or RDs.            "

## 2023-01-26 ENCOUNTER — OFFICE VISIT (OUTPATIENT)
Dept: INTERNAL MEDICINE | Facility: CLINIC | Age: 68
End: 2023-01-26
Payer: COMMERCIAL

## 2023-01-26 VITALS
BODY MASS INDEX: 24.72 KG/M2 | DIASTOLIC BLOOD PRESSURE: 71 MMHG | HEART RATE: 72 BPM | HEIGHT: 68 IN | SYSTOLIC BLOOD PRESSURE: 128 MMHG | WEIGHT: 163.13 LBS

## 2023-01-26 DIAGNOSIS — G56.02 CARPAL TUNNEL SYNDROME OF LEFT WRIST: Primary | ICD-10-CM

## 2023-01-26 PROCEDURE — 99214 PR OFFICE/OUTPT VISIT, EST, LEVL IV, 30-39 MIN: ICD-10-PCS | Mod: S$GLB,,, | Performed by: PHYSICIAN ASSISTANT

## 2023-01-26 PROCEDURE — 3288F PR FALLS RISK ASSESSMENT DOCUMENTED: ICD-10-PCS | Mod: CPTII,S$GLB,, | Performed by: PHYSICIAN ASSISTANT

## 2023-01-26 PROCEDURE — 3078F PR MOST RECENT DIASTOLIC BLOOD PRESSURE < 80 MM HG: ICD-10-PCS | Mod: CPTII,S$GLB,, | Performed by: PHYSICIAN ASSISTANT

## 2023-01-26 PROCEDURE — 3008F BODY MASS INDEX DOCD: CPT | Mod: CPTII,S$GLB,, | Performed by: PHYSICIAN ASSISTANT

## 2023-01-26 PROCEDURE — 1101F PR PT FALLS ASSESS DOC 0-1 FALLS W/OUT INJ PAST YR: ICD-10-PCS | Mod: CPTII,S$GLB,, | Performed by: PHYSICIAN ASSISTANT

## 2023-01-26 PROCEDURE — 3288F FALL RISK ASSESSMENT DOCD: CPT | Mod: CPTII,S$GLB,, | Performed by: PHYSICIAN ASSISTANT

## 2023-01-26 PROCEDURE — 3078F DIAST BP <80 MM HG: CPT | Mod: CPTII,S$GLB,, | Performed by: PHYSICIAN ASSISTANT

## 2023-01-26 PROCEDURE — 3008F PR BODY MASS INDEX (BMI) DOCUMENTED: ICD-10-PCS | Mod: CPTII,S$GLB,, | Performed by: PHYSICIAN ASSISTANT

## 2023-01-26 PROCEDURE — 99214 OFFICE O/P EST MOD 30 MIN: CPT | Mod: S$GLB,,, | Performed by: PHYSICIAN ASSISTANT

## 2023-01-26 PROCEDURE — 1159F MED LIST DOCD IN RCRD: CPT | Mod: CPTII,S$GLB,, | Performed by: PHYSICIAN ASSISTANT

## 2023-01-26 PROCEDURE — 99999 PR PBB SHADOW E&M-EST. PATIENT-LVL IV: ICD-10-PCS | Mod: PBBFAC,,, | Performed by: PHYSICIAN ASSISTANT

## 2023-01-26 PROCEDURE — 1126F AMNT PAIN NOTED NONE PRSNT: CPT | Mod: CPTII,S$GLB,, | Performed by: PHYSICIAN ASSISTANT

## 2023-01-26 PROCEDURE — 3074F SYST BP LT 130 MM HG: CPT | Mod: CPTII,S$GLB,, | Performed by: PHYSICIAN ASSISTANT

## 2023-01-26 PROCEDURE — 3074F PR MOST RECENT SYSTOLIC BLOOD PRESSURE < 130 MM HG: ICD-10-PCS | Mod: CPTII,S$GLB,, | Performed by: PHYSICIAN ASSISTANT

## 2023-01-26 PROCEDURE — 1101F PT FALLS ASSESS-DOCD LE1/YR: CPT | Mod: CPTII,S$GLB,, | Performed by: PHYSICIAN ASSISTANT

## 2023-01-26 PROCEDURE — 1159F PR MEDICATION LIST DOCUMENTED IN MEDICAL RECORD: ICD-10-PCS | Mod: CPTII,S$GLB,, | Performed by: PHYSICIAN ASSISTANT

## 2023-01-26 PROCEDURE — 1126F PR PAIN SEVERITY QUANTIFIED, NO PAIN PRESENT: ICD-10-PCS | Mod: CPTII,S$GLB,, | Performed by: PHYSICIAN ASSISTANT

## 2023-01-26 PROCEDURE — 99999 PR PBB SHADOW E&M-EST. PATIENT-LVL IV: CPT | Mod: PBBFAC,,, | Performed by: PHYSICIAN ASSISTANT

## 2023-01-26 RX ORDER — DICLOFENAC SODIUM 10 MG/G
2 GEL TOPICAL DAILY
Qty: 450 G | Refills: 0 | Status: SHIPPED | OUTPATIENT
Start: 2023-01-26

## 2023-01-26 NOTE — PROGRESS NOTES
INTERNAL MEDICINE URGENT VISIT NOTE    CHIEF COMPLAINT     Chief Complaint   Patient presents with    Hand Pain       HPI     Cuate Fair is a 67 y.o. male who presents for an urgent visit today.    PCP is Carolyne Beth MD, patient is known to me.     Patient presents with complaints of left hand pain that has been present for months. He reports that pain is present most felt at night. He reports pain with burning sensation and sometimes tingling. He does admit that he tends to curl his wrists at night when sleeping. He denies history of carpal tunnel syndrome. He denies fever, chills, nausea, vomiting, upper extremity weakness. He is unaccompanied in the office.      Past Medical History:  Past Medical History:   Diagnosis Date    Cataract     Colon polyp     Glaucoma suspect        Home Medications:  Prior to Admission medications    Medication Sig Start Date End Date Taking? Authorizing Provider   amLODIPine (NORVASC) 5 MG tablet TAKE 1 TABLET(5 MG) BY MOUTH EVERY DAY 12/23/22  Yes Carolyne Beth MD   atorvastatin (LIPITOR) 40 MG tablet TAKE 1 TABLET(40 MG) BY MOUTH EVERY DAY 6/24/22  Yes Carolyne Beth MD   cyanocobalamin (VITAMIN B-12) 1000 MCG tablet Take 1 tablet (1,000 mcg total) by mouth once daily. 6/14/18  Yes Carolyne Beth MD   hydrocortisone 2.5 % cream Apply topically 2 (two) times daily. 7/8/21  Yes Felicitas Sheldon PA-C   hydrOXYchloroQUINE (PLAQUENIL) 200 mg tablet 200 mg bid 3/3/22  Yes Juarez Jordan MD   ketoconazole (NIZORAL) 2 % cream Apply topically once daily. 12/14/18  Yes Carolyne Beth MD   losartan (COZAAR) 50 MG tablet TAKE 1 TABLET(50 MG) BY MOUTH EVERY DAY 3/23/22  Yes Carolyne Beth MD   sars-cov-2, covid-19, (MODERNA COVID-19) 50 mcg/0.25 ml injection (BOOSTER) Inject into the muscle. 8/19/22  Yes    sildenafiL (VIAGRA) 50 MG tablet Take 1 tablet (50 mg total) by mouth daily as needed for Erectile Dysfunction (take 30 min  "prior to sexual activity). 7/7/22 7/7/23 Yes Pop Ahumada, DO   triamcinolone acetonide 0.1% (KENALOG) 0.1 % cream Apply topically 2 (two) times daily. for 10 days 6/23/22 1/26/23 Yes Carolyne Beth MD       Review of Systems:  Review of Systems   Musculoskeletal:         Left wrist pain      Health Maintainence:   Immunizations:  Health Maintenance         Date Due Completion Date    Influenza Vaccine (1) 09/01/2022 9/17/2020    COVID-19 Vaccine (5 - Booster for Moderna series) 10/14/2022 8/19/2022    Lipid Panel 05/19/2023 5/19/2022    Override on 3/7/2018: Done    PROSTATE-SPECIFIC ANTIGEN 11/22/2023 11/22/2022    TETANUS VACCINE 11/09/2028 11/9/2018    Colorectal Cancer Screening 06/07/2031 6/7/2021             PHYSICAL EXAM     /71 (BP Location: Right arm, Patient Position: Sitting, BP Method: Medium (Automatic))   Pulse 72   Ht 5' 8" (1.727 m)   Wt 74 kg (163 lb 2.3 oz)   BMI 24.81 kg/m²     Physical Exam  Vitals and nursing note reviewed.   Constitutional:       Appearance: Normal appearance.      Comments: Healthy appearing male in NAD or apparent pain. He makes good eye contact, speaks in clear full sentences and ambulates with ease.        HENT:      Head: Normocephalic and atraumatic.      Nose: Nose normal.      Mouth/Throat:      Pharynx: Oropharynx is clear.   Eyes:      Conjunctiva/sclera: Conjunctivae normal.   Cardiovascular:      Rate and Rhythm: Normal rate and regular rhythm.      Pulses: Normal pulses.   Pulmonary:      Effort: No respiratory distress.   Abdominal:      Tenderness: There is no abdominal tenderness.   Musculoskeletal:         General: Normal range of motion.      Cervical back: No rigidity.      Comments: He is right hand dominant   He has positive left sided Tinel's sign  He has normal  strength and sensation to light touch on exam.    Skin:     General: Skin is warm and dry.      Capillary Refill: Capillary refill takes less than 2 seconds.      " Findings: No rash.   Neurological:      General: No focal deficit present.      Mental Status: He is alert.      Gait: Gait normal.   Psychiatric:         Mood and Affect: Mood normal.       LABS     Lab Results   Component Value Date    HGBA1C 5.5 05/19/2022     CMP  Sodium   Date Value Ref Range Status   05/19/2022 141 136 - 145 mmol/L Final     Potassium   Date Value Ref Range Status   05/19/2022 4.2 3.5 - 5.1 mmol/L Final     Chloride   Date Value Ref Range Status   05/19/2022 106 95 - 110 mmol/L Final     CO2   Date Value Ref Range Status   05/19/2022 27 23 - 29 mmol/L Final     Glucose   Date Value Ref Range Status   05/19/2022 87 70 - 110 mg/dL Final     BUN   Date Value Ref Range Status   05/19/2022 19 8 - 23 mg/dL Final     Creatinine   Date Value Ref Range Status   05/19/2022 1.2 0.5 - 1.4 mg/dL Final     Calcium   Date Value Ref Range Status   05/19/2022 9.2 8.7 - 10.5 mg/dL Final     Total Protein   Date Value Ref Range Status   05/19/2022 7.5 6.0 - 8.4 g/dL Final     Albumin   Date Value Ref Range Status   05/19/2022 4.4 3.5 - 5.2 g/dL Final     Total Bilirubin   Date Value Ref Range Status   05/19/2022 0.7 0.1 - 1.0 mg/dL Final     Comment:     For infants and newborns, interpretation of results should be based  on gestational age, weight and in agreement with clinical  observations.    Premature Infant recommended reference ranges:  Up to 24 hours.............<8.0 mg/dL  Up to 48 hours............<12.0 mg/dL  3-5 days..................<15.0 mg/dL  6-29 days.................<15.0 mg/dL       Alkaline Phosphatase   Date Value Ref Range Status   05/19/2022 67 55 - 135 U/L Final     AST   Date Value Ref Range Status   05/19/2022 26 10 - 40 U/L Final     ALT   Date Value Ref Range Status   05/19/2022 20 10 - 44 U/L Final     Anion Gap   Date Value Ref Range Status   05/19/2022 8 8 - 16 mmol/L Final     eGFR if    Date Value Ref Range Status   05/19/2022 >60 >60 mL/min/1.73 m^2 Final      eGFR if non    Date Value Ref Range Status   05/19/2022 >60 >60 mL/min/1.73 m^2 Final     Comment:     Calculation used to obtain the estimated glomerular filtration  rate (eGFR) is the CKD-EPI equation.        Lab Results   Component Value Date    WBC 3.81 (L) 05/19/2022    HGB 17.3 05/19/2022    HCT 50.4 05/19/2022    MCV 90 05/19/2022     05/19/2022     Lab Results   Component Value Date    CHOL 137 05/19/2022    CHOL 156 12/15/2020    CHOL 161 12/04/2019     Lab Results   Component Value Date    HDL 55 05/19/2022    HDL 51 12/15/2020    HDL 47 12/04/2019     Lab Results   Component Value Date    LDLCALC 74.4 05/19/2022    LDLCALC 91.6 12/15/2020    LDLCALC 102.4 12/04/2019     Lab Results   Component Value Date    TRIG 38 05/19/2022    TRIG 67 12/15/2020    TRIG 58 12/04/2019     Lab Results   Component Value Date    CHOLHDL 40.1 05/19/2022    CHOLHDL 32.7 12/15/2020    CHOLHDL 29.2 12/04/2019     Lab Results   Component Value Date    TSH 0.705 05/19/2022       ASSESSMENT/PLAN     Cuate Fair is a 67 y.o. male     Cuate was seen today for hand pain. Discussed CTS symptoms and mgmt. He will use Voltaren Gel daily and wear splint nightly. If no improvement in 4 weeks, will refer to hand clinic. He is aware of and amenable to plan.     Diagnoses and all orders for this visit:    Carpal tunnel syndrome of left wrist    Other orders  -     diclofenac sodium (VOLTAREN) 1 % Gel; Apply 2 g topically once daily.       Patient was counseled on when and how to seek emergent care.       Felicitas Sheldon PA-C   Department of Internal Medicine - [facility]   3:00 PM

## 2023-05-05 ENCOUNTER — OFFICE VISIT (OUTPATIENT)
Dept: URGENT CARE | Facility: CLINIC | Age: 68
End: 2023-05-05
Payer: OTHER MISCELLANEOUS

## 2023-05-05 VITALS
WEIGHT: 169 LBS | HEIGHT: 66 IN | TEMPERATURE: 98 F | SYSTOLIC BLOOD PRESSURE: 125 MMHG | OXYGEN SATURATION: 98 % | BODY MASS INDEX: 27.16 KG/M2 | DIASTOLIC BLOOD PRESSURE: 88 MMHG | HEART RATE: 88 BPM

## 2023-05-05 DIAGNOSIS — T26.91XA CHEMICAL INJURY OF EYE, RIGHT, INITIAL ENCOUNTER: Primary | ICD-10-CM
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
## 2023-05-05 DIAGNOSIS — Z02.6 ENCOUNTER RELATED TO WORKER'S COMPENSATION CLAIM: ICD-10-CM

## 2023-05-05 PROCEDURE — 99203 OFFICE O/P NEW LOW 30 MIN: CPT | Mod: S$GLB,,, | Performed by: PHYSICIAN ASSISTANT

## 2023-05-05 PROCEDURE — 99203 PR OFFICE/OUTPT VISIT, NEW, LEVL III, 30-44 MIN: ICD-10-PCS | Mod: S$GLB,,, | Performed by: PHYSICIAN ASSISTANT

## 2023-05-05 NOTE — LETTER
Urgent Care - [address] [address]  Phone: [phone]  Fax: [phone]  [facility]: [phone]    Pt Name: Cuate Fair  Injury Date: 05/05/2023   Employee ID: [id number] Date of First Treatment: 05/05/2023   Company: [employer]      Appointment Time: 08:45 AM Arrived: 8:07AM   Provider: Adriel Salazar PA-C Time Out:9:50AM     Office Treatment:   1. Chemical injury of eye, right, initial encounter    2. Work related injury    3. Encounter related to worker's compensation claim               Restrictions: Regular Duty     Return Appointment: 5/8/2023 at 8:30AM    KW

## 2023-05-05 NOTE — PROGRESS NOTES
Subjective:      Patient ID: Cuate Fair is a 67 y.o. male.    Chief Complaint: Eye Injury (RIGHT EYE)    KW  Patient's place of employment - [employer]  Patient's job title - TECH  Date of injury - 05/05/2023  Body part injured including left or right - RIGHT EYE  Injury Mechanism - HE WAS ABOUT TO FIX ON A CAR WHEN THE WIND BLEW THE ACID FROM THE BATTERY GET INTO HIS RIGHT EYE  What they were doing when they got hurt - HE WAS WORKING ON A CAR  What they did immediately after - FLUSHED WATER IN HIS RIGHT EYE  Pain scale right now - 6        66 y/o M presents with right eye pain and irritation since this morning. Pt reports trying to work on a car when the wind blew battery acid into his right eye. He denies ingestion or other injury. Says his vision is somewhat blurry. Says it hurts worse when he moves the eye. Says he flushed the eye with water, then came to clinic for treatment. MEB    Eye Injury   Associated symptoms include blurred vision, eye redness and itching.     Constitution: Positive for activity change.   HENT:  Negative for facial swelling and facial trauma.    Neck: Negative for neck pain.   Cardiovascular:  Negative for chest trauma.   Eyes:  Positive for eye itching, eye pain, eye redness and blurred vision.   Respiratory:  Negative for shortness of breath.    Gastrointestinal:  Negative for abdominal trauma.   Musculoskeletal:  Positive for pain.   Skin:  Negative for color change.   Neurological:  Negative for headaches.   Objective:     Physical Exam  Vitals and nursing note reviewed.   Constitutional:       General: He is awake.      Appearance: Normal appearance. He is well-developed.   HENT:      Head: Normocephalic and atraumatic. No raccoon eyes or Perera's sign.      Right Ear: Hearing and external ear normal.      Left Ear: Hearing and external ear normal.      Nose: Nose normal. No nasal deformity.   Eyes:      General: Lids are normal. Lids are everted, no foreign bodies  appreciated. Vision grossly intact. Gaze aligned appropriately.         Right eye: No foreign body.      Extraocular Movements: Extraocular movements intact.      Conjunctiva/sclera:      Right eye: Right conjunctiva is injected. No exudate or hemorrhage.     Pupils: Pupils are equal, round, and reactive to light.      Right eye: No corneal abrasion or fluorescein uptake.      Comments: Visual acuity reviewed.    Neck:      Trachea: Trachea normal.   Cardiovascular:      Pulses: Normal pulses.   Pulmonary:      Effort: Pulmonary effort is normal. No respiratory distress.   Musculoskeletal:      Cervical back: No tenderness.   Skin:     General: Skin is warm and dry.   Neurological:      Mental Status: He is alert. He is not disoriented.      Gait: Gait is intact. Gait normal.   Psychiatric:         Attention and Perception: Attention normal.         Mood and Affect: Mood normal.         Speech: Speech normal.         Behavior: Behavior normal. Behavior is cooperative.        pH = 7.0 per paper pre-irrigation.      Assessment:      1. Chemical injury of eye, right, initial encounter    2. Work related injury      Plan:     Copious irrigation with tap water using 50ml syringe. Total of 500ml. Pt tolerated well and reports pain and irritation improved greatly post-irrigation.   Instructed to continue to irrigate with tap water throughout the day. ED precautions discussed.          Restrictions: Regular Duty  Follow up in about 3 days (around 5/8/2023).

## 2023-05-08 ENCOUNTER — OFFICE VISIT (OUTPATIENT)
Dept: URGENT CARE | Facility: CLINIC | Age: 68
End: 2023-05-08
Payer: OTHER MISCELLANEOUS

## 2023-05-08 VITALS
HEART RATE: 62 BPM | DIASTOLIC BLOOD PRESSURE: 94 MMHG | SYSTOLIC BLOOD PRESSURE: 138 MMHG | TEMPERATURE: 98 F | OXYGEN SATURATION: 97 %

## 2023-05-08 DIAGNOSIS — T26.91XD: Primary | ICD-10-CM

## 2023-05-08 DIAGNOSIS — Z02.6 ENCOUNTER RELATED TO WORKER'S COMPENSATION CLAIM: ICD-10-CM
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
## 2023-05-08 PROCEDURE — 99213 OFFICE O/P EST LOW 20 MIN: CPT | Mod: S$GLB,,, | Performed by: PHYSICIAN ASSISTANT

## 2023-05-08 PROCEDURE — 99213 PR OFFICE/OUTPT VISIT, EST, LEVL III, 20-29 MIN: ICD-10-PCS | Mod: S$GLB,,, | Performed by: PHYSICIAN ASSISTANT

## 2023-05-08 NOTE — PROGRESS NOTES
Subjective:      Patient ID: Cuate Fair is a 67 y.o. male.    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  He reports symptoms resolved.  He denies any vision problems. MEB    Eye Problem   Pertinent negatives include no blurred vision, eye discharge, double vision, eye redness or itching.   Constitution: Negative for activity change.   Eyes:  Negative for eye discharge, eye itching, eye pain, eye redness, vision loss, double vision and blurred vision.   Objective:     Physical Exam  Vitals and nursing note reviewed.   Constitutional:       General: He is awake.      Appearance: Normal appearance. He is well-developed.   HENT:      Head: Normocephalic and atraumatic. No raccoon eyes or Perera's sign.      Right Ear: Hearing and external ear normal.      Left Ear: Hearing and external ear normal.      Nose: Nose normal. No nasal deformity.   Eyes:      General: Lids are normal. Lids are everted, no foreign bodies appreciated. Vision grossly intact. Gaze aligned appropriately.         Right eye: No foreign body or discharge.      Extraocular Movements: Extraocular movements intact.      Conjunctiva/sclera: Conjunctivae normal.      Pupils: Pupils are equal, round, and reactive to light.   Neck:      Trachea: Trachea normal.   Cardiovascular:      Pulses: Normal pulses.   Pulmonary:      Effort: Pulmonary effort is normal. No respiratory distress.   Musculoskeletal:      Cervical back: No tenderness.   Skin:     General: Skin is warm and dry.   Neurological:      Mental Status: He is alert. He is not disoriented.      Gait: Gait is intact. Gait normal.   Psychiatric:         Attention and  Perception: Attention normal.         Mood and Affect: Mood normal.         Speech: Speech normal.         Behavior: Behavior normal. Behavior is cooperative.      Assessment:      1. Chemical injury of right eye, subsequent encounter    2. Encounter related to worker's compensation claim    3. Work related injury      Plan:              Restrictions: Regular Duty, Discharged from Occupational Health  Follow up if symptoms worsen or fail to improve.

## 2023-05-08 NOTE — LETTER
Urgent Care - [address] [address]  Phone: [phone]  Fax: [phone]  [facility]: [id number]    Pt Name: Cuate Fair  Injury Date: 05/05/2023   Employee ID: [id number] Date of  Treatment: 05/08/2023   Company: [employer]      Appointment Time: 08:15 AM Arrived: 9:15am   Provider: Adriel Salazar PA-C Time Out:10:20am     Office Treatment:   1. Chemical injury of right eye, subsequent encounter    2. Encounter related to worker's compensation claim    3. Work related injury               Restrictions: Regular Duty, Discharged from Occupational Health      Regular Duty, Discharged from Occupational Health    KW

## 2023-05-30 DIAGNOSIS — I10 ESSENTIAL HYPERTENSION: ICD-10-CM

## 2023-05-30 NOTE — TELEPHONE ENCOUNTER
Care Due:                  Date            Visit Type   Department     Provider  --------------------------------------------------------------------------------                                EP -                              PRIMARY      [facility] INTERNAL  Last Visit: 06-      CARE ([facility])   MEDICINE       Carolyne Beth  Next Visit: None Scheduled  None         None Found                                                            Last  Test          Frequency    Reason                     Performed    Due Date  --------------------------------------------------------------------------------    Office Visit  12 months..  atorvastatin, losartan,    06- 06-                             sildenafiL...............    CMP.........  12 months..  atorvastatin, losartan...  05- 05-    Lipid Panel.  12 months..  atorvastatin.............  05- 05-    [facility] Embedded Care Due Messages. Reference number: [id number].   5/30/2023 5:47:14 PM CDT

## 2023-05-31 RX ORDER — LOSARTAN POTASSIUM 50 MG/1
TABLET ORAL
Qty: 90 TABLET | Refills: 3 | Status: SHIPPED | OUTPATIENT
Start: 2023-05-31

## 2023-05-31 NOTE — TELEPHONE ENCOUNTER
Refill Routing Note   Medication(s) are not appropriate for processing by [facility] for the following reason(s):      Required labs outdated  Required vitals abnormal    [facility] action(s):  Defer Appointment due  Labs due            Appointments  past 12m or future 3m with PCP    Date Provider   Last Visit   6/23/2022 Carolyne Beth MD   Next Visit   Visit date not found Carolyne Beth MD   ED visits in past 90 days: 0        Note composed:11:53 PM 05/30/2023

## 2023-06-01 DIAGNOSIS — I10 ESSENTIAL HYPERTENSION: ICD-10-CM

## 2023-06-01 RX ORDER — LOSARTAN POTASSIUM 50 MG/1
TABLET ORAL
Qty: 90 TABLET | Refills: 3 | OUTPATIENT
Start: 2023-06-01

## 2023-06-01 NOTE — TELEPHONE ENCOUNTER
Quick DC. Request already responded to by other means (e.g. phone or fax)   Refill Authorization Note   Cuate Fair  is requesting a refill authorization.  Brief Assessment and Rationale for Refill:  Quick Discontinue  Medication Therapy Plan:  Receipt confirmed by pharmacy (5/31/2023 10:47 AM CDT)    Medication Reconciliation Completed:  No      Comments:     Note composed:5:48 PM 06/01/2023

## 2023-06-01 NOTE — TELEPHONE ENCOUNTER
No care due was identified.  [facility] Embedded Care Due Messages. Reference number: [id number].   6/01/2023 3:51:37 PM CDT

## 2023-06-01 NOTE — TELEPHONE ENCOUNTER
Quick DC. Request already responded to by other means (e.g. phone or fax)   Refill Authorization Note   Cuate Fair  is requesting a refill authorization.  Brief Assessment and Rationale for Refill:  Quick Discontinue  Medication Therapy Plan:  Receipt confirmed by pharmacy (5/31/2023 10:47 AM CDT)    Medication Reconciliation Completed:  No      Comments:     Note composed:5:49 PM 06/01/2023

## 2023-06-01 NOTE — TELEPHONE ENCOUNTER
No care due was identified.  [facility] Embedded Care Due Messages. Reference number: [id number].   6/01/2023 3:50:29 PM CDT

## 2023-07-06 ENCOUNTER — OFFICE VISIT (OUTPATIENT)
Dept: INTERNAL MEDICINE | Facility: CLINIC | Age: 68
End: 2023-07-06
Payer: COMMERCIAL

## 2023-07-06 VITALS
SYSTOLIC BLOOD PRESSURE: 128 MMHG | OXYGEN SATURATION: 96 % | HEART RATE: 87 BPM | HEIGHT: 66 IN | BODY MASS INDEX: 27.81 KG/M2 | DIASTOLIC BLOOD PRESSURE: 76 MMHG | WEIGHT: 173.06 LBS

## 2023-07-06 DIAGNOSIS — J01.90 ACUTE SINUSITIS, RECURRENCE NOT SPECIFIED, UNSPECIFIED LOCATION: Primary | ICD-10-CM

## 2023-07-06 PROCEDURE — 3008F PR BODY MASS INDEX (BMI) DOCUMENTED: ICD-10-PCS | Mod: CPTII,S$GLB,, | Performed by: PHYSICIAN ASSISTANT

## 2023-07-06 PROCEDURE — 99214 PR OFFICE/OUTPT VISIT, EST, LEVL IV, 30-39 MIN: ICD-10-PCS | Mod: S$GLB,,, | Performed by: PHYSICIAN ASSISTANT

## 2023-07-06 PROCEDURE — 1125F AMNT PAIN NOTED PAIN PRSNT: CPT | Mod: CPTII,S$GLB,, | Performed by: PHYSICIAN ASSISTANT

## 2023-07-06 PROCEDURE — 1159F PR MEDICATION LIST DOCUMENTED IN MEDICAL RECORD: ICD-10-PCS | Mod: CPTII,S$GLB,, | Performed by: PHYSICIAN ASSISTANT

## 2023-07-06 PROCEDURE — 4010F ACE/ARB THERAPY RXD/TAKEN: CPT | Mod: CPTII,S$GLB,, | Performed by: PHYSICIAN ASSISTANT

## 2023-07-06 PROCEDURE — 99214 OFFICE O/P EST MOD 30 MIN: CPT | Mod: S$GLB,,, | Performed by: PHYSICIAN ASSISTANT

## 2023-07-06 PROCEDURE — 99999 PR PBB SHADOW E&M-EST. PATIENT-LVL III: CPT | Mod: PBBFAC,,, | Performed by: PHYSICIAN ASSISTANT

## 2023-07-06 PROCEDURE — 3008F BODY MASS INDEX DOCD: CPT | Mod: CPTII,S$GLB,, | Performed by: PHYSICIAN ASSISTANT

## 2023-07-06 PROCEDURE — 3074F SYST BP LT 130 MM HG: CPT | Mod: CPTII,S$GLB,, | Performed by: PHYSICIAN ASSISTANT

## 2023-07-06 PROCEDURE — 3078F PR MOST RECENT DIASTOLIC BLOOD PRESSURE < 80 MM HG: ICD-10-PCS | Mod: CPTII,S$GLB,, | Performed by: PHYSICIAN ASSISTANT

## 2023-07-06 PROCEDURE — 3074F PR MOST RECENT SYSTOLIC BLOOD PRESSURE < 130 MM HG: ICD-10-PCS | Mod: CPTII,S$GLB,, | Performed by: PHYSICIAN ASSISTANT

## 2023-07-06 PROCEDURE — 1159F MED LIST DOCD IN RCRD: CPT | Mod: CPTII,S$GLB,, | Performed by: PHYSICIAN ASSISTANT

## 2023-07-06 PROCEDURE — 3078F DIAST BP <80 MM HG: CPT | Mod: CPTII,S$GLB,, | Performed by: PHYSICIAN ASSISTANT

## 2023-07-06 PROCEDURE — 1101F PT FALLS ASSESS-DOCD LE1/YR: CPT | Mod: CPTII,S$GLB,, | Performed by: PHYSICIAN ASSISTANT

## 2023-07-06 PROCEDURE — 3288F PR FALLS RISK ASSESSMENT DOCUMENTED: ICD-10-PCS | Mod: CPTII,S$GLB,, | Performed by: PHYSICIAN ASSISTANT

## 2023-07-06 PROCEDURE — 1125F PR PAIN SEVERITY QUANTIFIED, PAIN PRESENT: ICD-10-PCS | Mod: CPTII,S$GLB,, | Performed by: PHYSICIAN ASSISTANT

## 2023-07-06 PROCEDURE — 1101F PR PT FALLS ASSESS DOC 0-1 FALLS W/OUT INJ PAST YR: ICD-10-PCS | Mod: CPTII,S$GLB,, | Performed by: PHYSICIAN ASSISTANT

## 2023-07-06 PROCEDURE — 4010F PR ACE/ARB THEARPY RXD/TAKEN: ICD-10-PCS | Mod: CPTII,S$GLB,, | Performed by: PHYSICIAN ASSISTANT

## 2023-07-06 PROCEDURE — 3288F FALL RISK ASSESSMENT DOCD: CPT | Mod: CPTII,S$GLB,, | Performed by: PHYSICIAN ASSISTANT

## 2023-07-06 PROCEDURE — 99999 PR PBB SHADOW E&M-EST. PATIENT-LVL III: ICD-10-PCS | Mod: PBBFAC,,, | Performed by: PHYSICIAN ASSISTANT

## 2023-07-06 RX ORDER — BENZONATATE 100 MG/1
100 CAPSULE ORAL 3 TIMES DAILY PRN
Qty: 30 CAPSULE | Refills: 0 | Status: SHIPPED | OUTPATIENT
Start: 2023-07-06 | End: 2024-01-02 | Stop reason: ALTCHOICE

## 2023-07-06 RX ORDER — FLUTICASONE PROPIONATE 50 MCG
1 SPRAY, SUSPENSION (ML) NASAL DAILY
Qty: 11.1 ML | Refills: 0 | Status: SHIPPED | OUTPATIENT
Start: 2023-07-06 | End: 2023-09-06

## 2023-07-06 RX ORDER — METHOCARBAMOL 750 MG/1
750 TABLET, FILM COATED ORAL 2 TIMES DAILY
COMMUNITY
Start: 2023-07-05 | End: 2023-12-08 | Stop reason: SDUPTHER

## 2023-07-06 RX ORDER — PROMETHAZINE HYDROCHLORIDE AND DEXTROMETHORPHAN HYDROBROMIDE 6.25; 15 MG/5ML; MG/5ML
5 SYRUP ORAL EVERY 4 HOURS PRN
Qty: 180 ML | Refills: 0 | Status: SHIPPED | OUTPATIENT
Start: 2023-07-06 | End: 2023-07-16

## 2023-07-06 RX ORDER — CETIRIZINE HYDROCHLORIDE 10 MG/1
10 TABLET ORAL DAILY
Qty: 30 TABLET | Refills: 0 | Status: SHIPPED | OUTPATIENT
Start: 2023-07-06 | End: 2024-07-05

## 2023-07-06 NOTE — PROGRESS NOTES
INTERNAL MEDICINE URGENT VISIT NOTE    CHIEF COMPLAINT     Chief Complaint   Patient presents with    Sinus Problem       HPI     Cuate Fair is a 68 y.o. male who presents for an urgent visit today.    PCP is Carolyne Beth MD, patient is known to me.     Mr. Fair has had coughing, congestion, sore throat for the past three weeks. No fever, no chest pain. No sick contacts. No recent travel. OTC cough and cold medications not helping.     Past Medical History:  Past Medical History:   Diagnosis Date    Cataract     Colon polyp     Glaucoma suspect        Home Medications:  Prior to Admission medications    Medication Sig Start Date End Date Taking? Authorizing Provider   amLODIPine (NORVASC) 5 MG tablet TAKE 1 TABLET(5 MG) BY MOUTH EVERY DAY 12/23/22  Yes Carolyne Beth MD   atorvastatin (LIPITOR) 40 MG tablet TAKE 1 TABLET(40 MG) BY MOUTH EVERY DAY 6/24/22  Yes Carolyne Beth MD   cyanocobalamin (VITAMIN B-12) 1000 MCG tablet Take 1 tablet (1,000 mcg total) by mouth once daily. 6/14/18  Yes Carolyne Beth MD   diclofenac sodium (VOLTAREN) 1 % Gel Apply 2 g topically once daily. 1/26/23  Yes Felicitas Sheldon PA-C   hydrocortisone 2.5 % cream Apply topically 2 (two) times daily. 7/8/21  Yes Felicitas Sheldon PA-C   hydrOXYchloroQUINE (PLAQUENIL) 200 mg tablet 200 mg bid 3/3/22  Yes Juarez Jordan MD   ketoconazole (NIZORAL) 2 % cream Apply topically once daily. 12/14/18  Yes Carolyne Beth MD   losartan (COZAAR) 50 MG tablet TAKE 1 TABLET(50 MG) BY MOUTH EVERY DAY 5/31/23  Yes Felicitas Sheldon PA-C   methocarbamoL (ROBAXIN) 750 MG Tab Take 750 mg by mouth 2 (two) times daily. 7/5/23  Yes Historical Provider   sars-cov-2, covid-19, (MODERNA COVID-19) 50 mcg/0.25 ml injection (BOOSTER) Inject into the muscle. 8/19/22  Yes    sildenafiL (VIAGRA) 50 MG tablet Take 1 tablet (50 mg total) by mouth daily as needed for Erectile Dysfunction (take 30 min prior to sexual  "activity). 7/7/22 7/7/23 Yes Pop Ahumada, DO   triamcinolone acetonide 0.1% (KENALOG) 0.1 % cream Apply topically 2 (two) times daily. for 10 days 6/23/22 1/26/23  Carolyne Beth MD       Review of Systems:  Review of Systems   Constitutional:  Negative for chills and fever.   HENT:  Positive for congestion and sore throat. Negative for trouble swallowing.    Eyes:  Negative for visual disturbance.   Respiratory:  Positive for cough. Negative for shortness of breath.    Cardiovascular:  Negative for chest pain.   Gastrointestinal:  Negative for abdominal pain, constipation, diarrhea, nausea and vomiting.   Genitourinary:  Negative for dysuria and flank pain.   Musculoskeletal:  Negative for back pain, neck pain and neck stiffness.   Skin:  Negative for rash.   Neurological:  Negative for dizziness, syncope, weakness and headaches.   Psychiatric/Behavioral:  Negative for confusion.        Health Maintainence:   Immunizations:  Health Maintenance         Date Due Completion Date    COVID-19 Vaccine (6 - Moderna series) 10/14/2022 8/19/2022    Lipid Panel 05/19/2023 5/19/2022    Override on 3/7/2018: Done    Influenza Vaccine (1) 09/01/2023 9/17/2020    PROSTATE-SPECIFIC ANTIGEN 11/22/2023 11/22/2022    Hemoglobin A1c (Diabetic Prevention Screening) 05/19/2025 5/19/2022    TETANUS VACCINE 11/09/2028 11/9/2018    Colorectal Cancer Screening 06/07/2031 6/7/2021             PHYSICAL EXAM     /76 (BP Location: Left arm, Patient Position: Sitting, BP Method: Large (Manual))   Pulse 87   Ht 5' 6" (1.676 m)   Wt 78.5 kg (173 lb 1 oz)   SpO2 96%   BMI 27.93 kg/m²     Physical Exam  Vitals and nursing note reviewed.   Constitutional:       Appearance: Normal appearance.      Comments: Elderly male in NAD or apparent pain. He makes good eye contact, speaks in clear full sentences and ambulates with ease. He sounds nasally congested when speaking.    HENT:      Head: Normocephalic and atraumatic.      Nose: " Nose normal.      Mouth/Throat:      Pharynx: Oropharynx is clear.   Eyes:      Conjunctiva/sclera: Conjunctivae normal.   Cardiovascular:      Rate and Rhythm: Normal rate and regular rhythm.      Pulses: Normal pulses.   Pulmonary:      Effort: No respiratory distress.   Abdominal:      Tenderness: There is no abdominal tenderness.   Musculoskeletal:         General: Normal range of motion.      Cervical back: No rigidity.   Skin:     General: Skin is warm and dry.      Capillary Refill: Capillary refill takes less than 2 seconds.      Findings: No rash.   Neurological:      General: No focal deficit present.      Mental Status: He is alert.      Gait: Gait normal.   Psychiatric:         Mood and Affect: Mood normal.         LABS     Lab Results   Component Value Date    HGBA1C 5.5 05/19/2022     CMP  Sodium   Date Value Ref Range Status   05/19/2022 141 136 - 145 mmol/L Final     Potassium   Date Value Ref Range Status   05/19/2022 4.2 3.5 - 5.1 mmol/L Final     Chloride   Date Value Ref Range Status   05/19/2022 106 95 - 110 mmol/L Final     CO2   Date Value Ref Range Status   05/19/2022 27 23 - 29 mmol/L Final     Glucose   Date Value Ref Range Status   05/19/2022 87 70 - 110 mg/dL Final     BUN   Date Value Ref Range Status   05/19/2022 19 8 - 23 mg/dL Final     Creatinine   Date Value Ref Range Status   05/19/2022 1.2 0.5 - 1.4 mg/dL Final     Calcium   Date Value Ref Range Status   05/19/2022 9.2 8.7 - 10.5 mg/dL Final     Total Protein   Date Value Ref Range Status   05/19/2022 7.5 6.0 - 8.4 g/dL Final     Albumin   Date Value Ref Range Status   05/19/2022 4.4 3.5 - 5.2 g/dL Final     Total Bilirubin   Date Value Ref Range Status   05/19/2022 0.7 0.1 - 1.0 mg/dL Final     Comment:     For infants and newborns, interpretation of results should be based  on gestational age, weight and in agreement with clinical  observations.    Premature Infant recommended reference ranges:  Up to 24 hours.............<8.0  mg/dL  Up to 48 hours............<12.0 mg/dL  3-5 days..................<15.0 mg/dL  6-29 days.................<15.0 mg/dL       Alkaline Phosphatase   Date Value Ref Range Status   05/19/2022 67 55 - 135 U/L Final     AST   Date Value Ref Range Status   05/19/2022 26 10 - 40 U/L Final     ALT   Date Value Ref Range Status   05/19/2022 20 10 - 44 U/L Final     Anion Gap   Date Value Ref Range Status   05/19/2022 8 8 - 16 mmol/L Final     eGFR if    Date Value Ref Range Status   05/19/2022 >60 >60 mL/min/1.73 m^2 Final     eGFR if non    Date Value Ref Range Status   05/19/2022 >60 >60 mL/min/1.73 m^2 Final     Comment:     Calculation used to obtain the estimated glomerular filtration  rate (eGFR) is the CKD-EPI equation.        Lab Results   Component Value Date    WBC 3.81 (L) 05/19/2022    HGB 17.3 05/19/2022    HCT 50.4 05/19/2022    MCV 90 05/19/2022     05/19/2022     Lab Results   Component Value Date    CHOL 137 05/19/2022    CHOL 156 12/15/2020    CHOL 161 12/04/2019     Lab Results   Component Value Date    HDL 55 05/19/2022    HDL 51 12/15/2020    HDL 47 12/04/2019     Lab Results   Component Value Date    LDLCALC 74.4 05/19/2022    LDLCALC 91.6 12/15/2020    LDLCALC 102.4 12/04/2019     Lab Results   Component Value Date    TRIG 38 05/19/2022    TRIG 67 12/15/2020    TRIG 58 12/04/2019     Lab Results   Component Value Date    CHOLHDL 40.1 05/19/2022    CHOLHDL 32.7 12/15/2020    CHOLHDL 29.2 12/04/2019     Lab Results   Component Value Date    TSH 0.705 05/19/2022       ASSESSMENT/PLAN     Cuate Fair is a 68 y.o. male     Cuate was seen today for sinus problem.    Diagnoses and all orders for this visit:    Acute sinusitis, recurrence not specified, unspecified location    Other orders  -     benzonatate (TESSALON) 100 MG capsule; Take 1 capsule (100 mg total) by mouth 3 (three) times daily as needed for Cough.  -     promethazine-dextromethorphan  (PROMETHAZINE-DM) 6.25-15 mg/5 mL Syrp; Take 5 mLs by mouth every 4 (four) hours as needed (cough).  -     cetirizine (ZYRTEC) 10 MG tablet; Take 1 tablet (10 mg total) by mouth once daily.  -     fluticasone propionate (FLONASE) 50 mcg/actuation nasal spray; 1 spray (50 mcg total) by Each Nostril route once daily.       Patient was counseled on when and how to seek emergent care.       Felicitas Sheldon PA-C   Department of Internal Medicine - [facility]   2:28 PM

## 2023-07-07 DIAGNOSIS — E78.00 HYPERCHOLESTEROLEMIA: ICD-10-CM

## 2023-07-07 DIAGNOSIS — E78.5 HYPERLIPIDEMIA, UNSPECIFIED HYPERLIPIDEMIA TYPE: ICD-10-CM

## 2023-07-07 DIAGNOSIS — I10 HTN (HYPERTENSION), BENIGN: ICD-10-CM

## 2023-07-07 RX ORDER — AMLODIPINE BESYLATE 5 MG/1
5 TABLET ORAL DAILY
Qty: 90 TABLET | Refills: 0 | Status: SHIPPED | OUTPATIENT
Start: 2023-07-07 | End: 2023-10-19 | Stop reason: SDUPTHER

## 2023-07-07 RX ORDER — ATORVASTATIN CALCIUM 40 MG/1
40 TABLET, FILM COATED ORAL DAILY
Qty: 90 TABLET | Refills: 3 | Status: SHIPPED | OUTPATIENT
Start: 2023-07-07

## 2023-07-07 NOTE — TELEPHONE ENCOUNTER
No care due was identified.  Health [facility] Embedded Care Due Messages. Reference number: [id number].   7/07/2023 10:02:26 AM CDT

## 2023-07-07 NOTE — TELEPHONE ENCOUNTER
Refill Routing Note   Medication(s) are not appropriate for processing by [facility] for the following reason(s):      Required labs outdated:  CMP, Lipid panel    [facility] action(s):  Defer  Approve Care Due:  Labs due          Appointments  past 12m or future 3m with PCP    Date Provider   Last Visit   6/23/2022 Carolyne Beth MD   Next Visit   Visit date not found Carolyne Beth MD   ED visits in past 90 days: 0        Note composed:2:17 PM 07/07/2023

## 2023-09-03 DIAGNOSIS — J01.90 ACUTE SINUSITIS, RECURRENCE NOT SPECIFIED, UNSPECIFIED LOCATION: Primary | ICD-10-CM

## 2023-09-05 DIAGNOSIS — N52.9 ERECTILE DYSFUNCTION, UNSPECIFIED ERECTILE DYSFUNCTION TYPE: ICD-10-CM

## 2023-09-05 NOTE — TELEPHONE ENCOUNTER
Care Due:                  Date            Visit Type   Department     Provider  --------------------------------------------------------------------------------                                EP -                              PRIMARY      [facility] INTERNAL  Last Visit: 06-      CARE ([facility])   MEDICINE       Carolyne Beth  Next Visit: None Scheduled  None         None Found                                                            Last  Test          Frequency    Reason                     Performed    Due Date  --------------------------------------------------------------------------------    Office Visit  12 months..  amLODIPine, sildenafiL...  06- 06-    [facility] Embedded Care Due Messages. Reference number: [id number].   9/05/2023 8:51:58 AM CDT

## 2023-09-05 NOTE — TELEPHONE ENCOUNTER
Refill Routing Note   Medication(s) are not appropriate for processing by [facility] for the following reason(s):      No active prescription written by provider    [facility] action(s):  Defer Care Due:  None identified     Medication Therapy Plan: written 7/6/23 for URI by Felicitas Zhu PA-C      Appointments  past 12m or future 3m with PCP    Date Provider   Last Visit   6/23/2022 Carolyne Beth MD   Next Visit   9/5/2023 Carolyne Beth MD   ED visits in past 90 days: 0        Note composed:10:46 AM 09/05/2023

## 2023-09-05 NOTE — TELEPHONE ENCOUNTER
No care due was identified.  [facility] Embedded Care Due Messages. Reference number: [id number].   9/05/2023 10:45:56 AM CDT

## 2023-09-05 NOTE — TELEPHONE ENCOUNTER
----- Message from Agata Cannon sent at 9/5/2023  8:34 AM CDT -----  Regarding: refill  Who Called:WINIFRED FERNANDEZ [[id number]]      Refill or New Rx: Refill        RX Name and Strength  sildenafiL (VIAGRA) 50 MG tablet    Is this a 30 day or 90 day RX:      Preferred Pharmacy with phone number:    [id number] [address] [facility] [address]   Phone:  [phone]  Fax:  [phone]          Local or Mail Order:local       Would the patient rather a call back or a response via [facility]?      best Call Back Number:[phone]      Additional Information:

## 2023-09-06 RX ORDER — FLUTICASONE PROPIONATE 50 MCG
SPRAY, SUSPENSION (ML) NASAL
Qty: 16 G | Refills: 6 | Status: SHIPPED | OUTPATIENT
Start: 2023-09-06 | End: 2023-12-08 | Stop reason: SDUPTHER

## 2023-09-06 RX ORDER — SILDENAFIL 50 MG/1
50 TABLET, FILM COATED ORAL DAILY PRN
Qty: 30 TABLET | Refills: 6 | Status: SHIPPED | OUTPATIENT
Start: 2023-09-06 | End: 2024-09-05

## 2023-10-19 DIAGNOSIS — I10 HTN (HYPERTENSION), BENIGN: ICD-10-CM

## 2023-10-19 DIAGNOSIS — E78.00 HYPERCHOLESTEROLEMIA: ICD-10-CM

## 2023-10-19 RX ORDER — AMLODIPINE BESYLATE 5 MG/1
5 TABLET ORAL DAILY
Qty: 90 TABLET | Refills: 0 | Status: SHIPPED | OUTPATIENT
Start: 2023-10-19 | End: 2024-01-22 | Stop reason: SDUPTHER

## 2023-10-19 NOTE — TELEPHONE ENCOUNTER
No care due was identified.  Health [facility] Embedded Care Due Messages. Reference number: [id number].   10/19/2023 9:47:22 AM CDT

## 2023-10-19 NOTE — TELEPHONE ENCOUNTER
Refill Routing Note   Medication(s) are not appropriate for processing by [facility] for the following reason(s):      Patient not seen by provider within 15 months:     [facility] action(s):  Defer Care Due:  Appointment due   Medication Therapy Plan:         Appointments  past 12m or future 3m with PCP    Date Provider   Last Visit   6/23/2022 Carolyne Beth MD   Next Visit   Visit date not found Carolyne Beth MD   ED visits in past 90 days: 0        Note composed:2:25 PM 10/19/2023

## 2023-12-08 ENCOUNTER — OFFICE VISIT (OUTPATIENT)
Dept: INTERNAL MEDICINE | Facility: CLINIC | Age: 68
End: 2023-12-08
Payer: COMMERCIAL

## 2023-12-08 VITALS
SYSTOLIC BLOOD PRESSURE: 112 MMHG | WEIGHT: 173.94 LBS | BODY MASS INDEX: 27.95 KG/M2 | HEART RATE: 87 BPM | OXYGEN SATURATION: 95 % | DIASTOLIC BLOOD PRESSURE: 52 MMHG | HEIGHT: 66 IN

## 2023-12-08 DIAGNOSIS — H53.8 BLURRED VISION: Primary | ICD-10-CM

## 2023-12-08 DIAGNOSIS — M54.50 LOW BACK PAIN WITHOUT SCIATICA, UNSPECIFIED BACK PAIN LATERALITY, UNSPECIFIED CHRONICITY: ICD-10-CM

## 2023-12-08 DIAGNOSIS — J01.90 ACUTE SINUSITIS, RECURRENCE NOT SPECIFIED, UNSPECIFIED LOCATION: ICD-10-CM

## 2023-12-08 DIAGNOSIS — H65.92 FLUID LEVEL BEHIND TYMPANIC MEMBRANE OF LEFT EAR: ICD-10-CM

## 2023-12-08 PROCEDURE — 1159F MED LIST DOCD IN RCRD: CPT | Mod: CPTII,S$GLB,, | Performed by: PHYSICIAN ASSISTANT

## 2023-12-08 PROCEDURE — 3078F DIAST BP <80 MM HG: CPT | Mod: CPTII,S$GLB,, | Performed by: PHYSICIAN ASSISTANT

## 2023-12-08 PROCEDURE — 3078F PR MOST RECENT DIASTOLIC BLOOD PRESSURE < 80 MM HG: ICD-10-PCS | Mod: CPTII,S$GLB,, | Performed by: PHYSICIAN ASSISTANT

## 2023-12-08 PROCEDURE — 3008F PR BODY MASS INDEX (BMI) DOCUMENTED: ICD-10-PCS | Mod: CPTII,S$GLB,, | Performed by: PHYSICIAN ASSISTANT

## 2023-12-08 PROCEDURE — 3288F PR FALLS RISK ASSESSMENT DOCUMENTED: ICD-10-PCS | Mod: CPTII,S$GLB,, | Performed by: PHYSICIAN ASSISTANT

## 2023-12-08 PROCEDURE — 3008F BODY MASS INDEX DOCD: CPT | Mod: CPTII,S$GLB,, | Performed by: PHYSICIAN ASSISTANT

## 2023-12-08 PROCEDURE — 3074F PR MOST RECENT SYSTOLIC BLOOD PRESSURE < 130 MM HG: ICD-10-PCS | Mod: CPTII,S$GLB,, | Performed by: PHYSICIAN ASSISTANT

## 2023-12-08 PROCEDURE — 1101F PT FALLS ASSESS-DOCD LE1/YR: CPT | Mod: CPTII,S$GLB,, | Performed by: PHYSICIAN ASSISTANT

## 2023-12-08 PROCEDURE — 1101F PR PT FALLS ASSESS DOC 0-1 FALLS W/OUT INJ PAST YR: ICD-10-PCS | Mod: CPTII,S$GLB,, | Performed by: PHYSICIAN ASSISTANT

## 2023-12-08 PROCEDURE — 99999 PR PBB SHADOW E&M-EST. PATIENT-LVL V: CPT | Mod: PBBFAC,,, | Performed by: PHYSICIAN ASSISTANT

## 2023-12-08 PROCEDURE — 1159F PR MEDICATION LIST DOCUMENTED IN MEDICAL RECORD: ICD-10-PCS | Mod: CPTII,S$GLB,, | Performed by: PHYSICIAN ASSISTANT

## 2023-12-08 PROCEDURE — 99999 PR PBB SHADOW E&M-EST. PATIENT-LVL V: ICD-10-PCS | Mod: PBBFAC,,, | Performed by: PHYSICIAN ASSISTANT

## 2023-12-08 PROCEDURE — 99214 PR OFFICE/OUTPT VISIT, EST, LEVL IV, 30-39 MIN: ICD-10-PCS | Mod: S$GLB,,, | Performed by: PHYSICIAN ASSISTANT

## 2023-12-08 PROCEDURE — 1126F AMNT PAIN NOTED NONE PRSNT: CPT | Mod: CPTII,S$GLB,, | Performed by: PHYSICIAN ASSISTANT

## 2023-12-08 PROCEDURE — 3074F SYST BP LT 130 MM HG: CPT | Mod: CPTII,S$GLB,, | Performed by: PHYSICIAN ASSISTANT

## 2023-12-08 PROCEDURE — 3288F FALL RISK ASSESSMENT DOCD: CPT | Mod: CPTII,S$GLB,, | Performed by: PHYSICIAN ASSISTANT

## 2023-12-08 PROCEDURE — 1126F PR PAIN SEVERITY QUANTIFIED, NO PAIN PRESENT: ICD-10-PCS | Mod: CPTII,S$GLB,, | Performed by: PHYSICIAN ASSISTANT

## 2023-12-08 PROCEDURE — 4010F ACE/ARB THERAPY RXD/TAKEN: CPT | Mod: CPTII,S$GLB,, | Performed by: PHYSICIAN ASSISTANT

## 2023-12-08 PROCEDURE — 4010F PR ACE/ARB THEARPY RXD/TAKEN: ICD-10-PCS | Mod: CPTII,S$GLB,, | Performed by: PHYSICIAN ASSISTANT

## 2023-12-08 PROCEDURE — 99214 OFFICE O/P EST MOD 30 MIN: CPT | Mod: S$GLB,,, | Performed by: PHYSICIAN ASSISTANT

## 2023-12-08 RX ORDER — METHOCARBAMOL 750 MG/1
750 TABLET, FILM COATED ORAL NIGHTLY PRN
Qty: 30 TABLET | Refills: 0 | Status: SHIPPED | OUTPATIENT
Start: 2023-12-08 | End: 2024-01-02 | Stop reason: ALTCHOICE

## 2023-12-08 RX ORDER — FLUTICASONE PROPIONATE 50 MCG
1 SPRAY, SUSPENSION (ML) NASAL DAILY
Qty: 11.1 ML | Refills: 0 | Status: SHIPPED | OUTPATIENT
Start: 2023-12-08

## 2023-12-08 NOTE — PROGRESS NOTES
INTERNAL MEDICINE URGENT VISIT NOTE    CHIEF COMPLAINT     Chief Complaint   Patient presents with    Blurred Vision    Dizziness       HPI     Cuate Fair is a 68 y.o. male who presents for an urgent visit today.    PCP is Carolyne Beth MD, patient is known to me.     Patient presents with complaints of blurry vision and some lightheadedness that has been present for the past month. No associated HA or nausea or vomiting. Reports that there is some peripheral vision abn on the left field of vision, some in general mild blurry vision and some decreased vision from far. He has not been taking any medications to help with symptoms. He reports that lightheadedness was worse a few weeks ado and is somewhat improved. He denies chest pain or SOB. He has some left ear fullness and occasional popping.   He takes his BP medications regularly.   His wife checks his BP at home and reports that his BP is usually oin the low-normal range.   He denies any bleeding,  He has no eye pain  He has not seen an eye doctor in one year.   He is unaccompanied in the office today.     Past Medical History:  Past Medical History:   Diagnosis Date    Cataract     Colon polyp     Glaucoma suspect        Home Medications:  Prior to Admission medications    Medication Sig Start Date End Date Taking? Authorizing Provider   amLODIPine (NORVASC) 5 MG tablet Take 1 tablet (5 mg total) by mouth once daily. 10/19/23  Yes Carolyne Beth MD   atorvastatin (LIPITOR) 40 MG tablet Take 1 tablet (40 mg total) by mouth once daily. 7/7/23  Yes Felicitas Sheldon PA-C   benzonatate (TESSALON) 100 MG capsule Take 1 capsule (100 mg total) by mouth 3 (three) times daily as needed for Cough. 7/6/23  Yes Felicitas Sheldon PA-C   cetirizine (ZYRTEC) 10 MG tablet Take 1 tablet (10 mg total) by mouth once daily. 7/6/23 7/5/24 Yes Felicitas Sheldon PA-C   cyanocobalamin (VITAMIN B-12) 1000 MCG tablet Take 1 tablet (1,000 mcg total) by mouth  once daily. 6/14/18  Yes Carolyne Beth MD   diclofenac sodium (VOLTAREN) 1 % Gel Apply 2 g topically once daily. 1/26/23  Yes Felicitas Sheldon PA-C   hydrocortisone 2.5 % cream Apply topically 2 (two) times daily. 7/8/21  Yes Felicitas Sheldon PA-C   hydrOXYchloroQUINE (PLAQUENIL) 200 mg tablet 200 mg bid 3/3/22  Yes Juarez Jordan MD   ketoconazole (NIZORAL) 2 % cream Apply topically once daily. 12/14/18  Yes Carolyne Bteh MD   losartan (COZAAR) 50 MG tablet TAKE 1 TABLET(50 MG) BY MOUTH EVERY DAY 5/31/23  Yes Felicitas Sheldon PA-C   sars-cov-2, covid-19, (MODERNA COVID-19) 50 mcg/0.25 ml injection (BOOSTER) Inject into the muscle. 8/19/22  Yes    sildenafiL (VIAGRA) 50 MG tablet Take 1 tablet (50 mg total) by mouth daily as needed for Erectile Dysfunction (take 30 min prior to sexual activity). 9/6/23 9/5/24 Yes Carolyne Beth MD   fluticasone propionate (FLONASE) 50 mcg/actuation nasal spray SHAKE LIQUID AND USE 1 SPRAY(50 MCG) IN EACH NOSTRIL EVERY DAY 9/6/23 12/8/23 Yes Carolyne Beth MD   methocarbamoL (ROBAXIN) 750 MG Tab Take 750 mg by mouth 2 (two) times daily. 7/5/23 12/8/23 Yes Aurelia Encarnacion   fluticasone propionate (FLONASE) 50 mcg/actuation nasal spray 1 spray (50 mcg total) by Each Nostril route once daily. 12/8/23   Felicitas Sheldon PA-C   methocarbamoL (ROBAXIN) 750 MG Tab Take 1 tablet (750 mg total) by mouth nightly as needed (back spasms). 12/8/23   Felicitas Sheldon PA-C   triamcinolone acetonide 0.1% (KENALOG) 0.1 % cream Apply topically 2 (two) times daily. for 10 days 6/23/22 1/26/23  Carolyne Beth MD       Review of Systems:  Review of Systems   Constitutional:  Negative for chills and fever.   HENT:  Negative for sore throat and trouble swallowing.         Left ear fullness    Eyes:  Positive for visual disturbance.   Respiratory:  Negative for cough and shortness of breath.    Cardiovascular:  Negative for chest pain.  "  Gastrointestinal:  Negative for abdominal pain, constipation, diarrhea, nausea and vomiting.   Genitourinary:  Negative for dysuria and flank pain.   Musculoskeletal:  Negative for back pain, neck pain and neck stiffness.   Skin:  Negative for rash.   Neurological:  Positive for light-headedness. Negative for dizziness, syncope, weakness and headaches.   Psychiatric/Behavioral:  Negative for confusion.        Health Maintainence:   Immunizations:  Health Maintenance         Date Due Completion Date    RSV Vaccine (Age 60+ and Pregnant patients) (1 - 1-dose 60+ series) Never done ---    Lipid Panel 05/19/2023 5/19/2022    Override on 3/7/2018: Done    Influenza Vaccine (1) 09/01/2023 9/17/2020    COVID-19 Vaccine (7 - 2023-24 season) 09/01/2023 8/19/2022    PROSTATE-SPECIFIC ANTIGEN 11/22/2023 11/22/2022    Hemoglobin A1c (Diabetic Prevention Screening) 05/19/2025 5/19/2022    TETANUS VACCINE 11/09/2028 11/9/2018    Colorectal Cancer Screening 06/07/2031 6/7/2021             PHYSICAL EXAM     BP (!) 112/52 (BP Location: Left arm, Patient Position: Sitting, BP Method: Large (Manual))   Pulse 87   Ht 5' 6" (1.676 m)   Wt 78.9 kg (173 lb 15.1 oz)   SpO2 95%   BMI 28.08 kg/m²     Physical Exam  Vitals and nursing note reviewed.   Constitutional:       Appearance: Normal appearance.      Comments: Elderly male in NAD or apparent pain. He makes good eye contact, speaks in clear full sentences and ambulates with ease.    HENT:      Head: Normocephalic and atraumatic.      Ears:      Comments: Left TM has clear middle ear effusion      Nose: Nose normal.      Mouth/Throat:      Pharynx: Oropharynx is clear.   Eyes:      Conjunctiva/sclera: Conjunctivae normal.      Comments: No nystagmus    Cardiovascular:      Rate and Rhythm: Normal rate and regular rhythm.      Pulses: Normal pulses.   Pulmonary:      Effort: No respiratory distress.   Abdominal:      Tenderness: There is no abdominal tenderness.   Musculoskeletal: "         General: Normal range of motion.      Cervical back: No rigidity.   Skin:     General: Skin is warm and dry.      Capillary Refill: Capillary refill takes less than 2 seconds.      Findings: No rash.   Neurological:      General: No focal deficit present.      Mental Status: He is alert.      Gait: Gait normal.   Psychiatric:         Mood and Affect: Mood normal.         LABS     Lab Results   Component Value Date    HGBA1C 5.5 05/19/2022     CMP  Sodium   Date Value Ref Range Status   05/19/2022 141 136 - 145 mmol/L Final     Potassium   Date Value Ref Range Status   05/19/2022 4.2 3.5 - 5.1 mmol/L Final     Chloride   Date Value Ref Range Status   05/19/2022 106 95 - 110 mmol/L Final     CO2   Date Value Ref Range Status   05/19/2022 27 23 - 29 mmol/L Final     Glucose   Date Value Ref Range Status   05/19/2022 87 70 - 110 mg/dL Final     BUN   Date Value Ref Range Status   05/19/2022 19 8 - 23 mg/dL Final     Creatinine   Date Value Ref Range Status   05/19/2022 1.2 0.5 - 1.4 mg/dL Final     Calcium   Date Value Ref Range Status   05/19/2022 9.2 8.7 - 10.5 mg/dL Final     Total Protein   Date Value Ref Range Status   05/19/2022 7.5 6.0 - 8.4 g/dL Final     Albumin   Date Value Ref Range Status   05/19/2022 4.4 3.5 - 5.2 g/dL Final     Total Bilirubin   Date Value Ref Range Status   05/19/2022 0.7 0.1 - 1.0 mg/dL Final     Comment:     For infants and newborns, interpretation of results should be based  on gestational age, weight and in agreement with clinical  observations.    Premature Infant recommended reference ranges:  Up to 24 hours.............<8.0 mg/dL  Up to 48 hours............<12.0 mg/dL  3-5 days..................<15.0 mg/dL  6-29 days.................<15.0 mg/dL       Alkaline Phosphatase   Date Value Ref Range Status   05/19/2022 67 55 - 135 U/L Final     AST   Date Value Ref Range Status   05/19/2022 26 10 - 40 U/L Final     ALT   Date Value Ref Range Status   05/19/2022 20 10 - 44 U/L  Final     Anion Gap   Date Value Ref Range Status   05/19/2022 8 8 - 16 mmol/L Final     eGFR if    Date Value Ref Range Status   05/19/2022 >60 >60 mL/min/1.73 m^2 Final     eGFR if non    Date Value Ref Range Status   05/19/2022 >60 >60 mL/min/1.73 m^2 Final     Comment:     Calculation used to obtain the estimated glomerular filtration  rate (eGFR) is the CKD-EPI equation.        Lab Results   Component Value Date    WBC 3.81 (L) 05/19/2022    HGB 17.3 05/19/2022    HCT 50.4 05/19/2022    MCV 90 05/19/2022     05/19/2022     Lab Results   Component Value Date    CHOL 137 05/19/2022    CHOL 156 12/15/2020    CHOL 161 12/04/2019     Lab Results   Component Value Date    HDL 55 05/19/2022    HDL 51 12/15/2020    HDL 47 12/04/2019     Lab Results   Component Value Date    LDLCALC 74.4 05/19/2022    LDLCALC 91.6 12/15/2020    LDLCALC 102.4 12/04/2019     Lab Results   Component Value Date    TRIG 38 05/19/2022    TRIG 67 12/15/2020    TRIG 58 12/04/2019     Lab Results   Component Value Date    CHOLHDL 40.1 05/19/2022    CHOLHDL 32.7 12/15/2020    CHOLHDL 29.2 12/04/2019     Lab Results   Component Value Date    TSH 0.705 05/19/2022       ASSESSMENT/PLAN     Cuate Fair is a 68 y.o. male     Cuate was seen today for blurred vision and dizziness. No dizziness today in office. No clinical signs of hypotension or ortho stasis. No nystagmus. Will refer to optometry for updated exam and recommend Flonase.   He also complaints of left sided lower back pain that comes and goes and is worse when bending over and picking up something heave - has not seen B&S or ortho. Will place referral and non-sedating muscle relaxer for PRN use.     Diagnoses and all orders for this visit:    Blurred vision  -     Ambulatory referral/consult to Optometry; Future    Acute sinusitis, recurrence not specified, unspecified location    Low back pain without sciatica, unspecified back pain  laterality, unspecified chronicity  -     Ambulatory referral/consult to [facility]; Future    Fluid level behind tympanic membrane of left ear  -     fluticasone propionate (FLONASE) 50 mcg/actuation nasal spray; 1 spray (50 mcg total) by Each Nostril route once daily.    Other orders  -     methocarbamoL (ROBAXIN) 750 MG Tab; Take 1 tablet (750 mg total) by mouth nightly as needed (back spasms).         Patient was counseled on when and how to seek emergent care.       Felicitas Sheldon PA-C   Department of Internal Medicine - [facility]   3:34 PM

## 2023-12-18 ENCOUNTER — TELEPHONE (OUTPATIENT)
Dept: SPINE | Facility: CLINIC | Age: 68
End: 2023-12-18

## 2023-12-18 NOTE — TELEPHONE ENCOUNTER
Staff contacted pt regards on appointment with Kori on tomorrow , pt wanted to know if it was his mri .. Staff stated it's an follow up appointment .    Mri will be 3-5 business days , it won't be day of appointment. Pt agreed and understands .

## 2023-12-19 ENCOUNTER — OFFICE VISIT (OUTPATIENT)
Dept: SPINE | Facility: CLINIC | Age: 68
End: 2023-12-19
Payer: COMMERCIAL

## 2023-12-19 ENCOUNTER — HOSPITAL ENCOUNTER (OUTPATIENT)
Dept: RADIOLOGY | Facility: OTHER | Age: 68
Discharge: HOME | End: 2023-12-19
Attending: NURSE PRACTITIONER
Payer: COMMERCIAL

## 2023-12-19 VITALS
DIASTOLIC BLOOD PRESSURE: 72 MMHG | SYSTOLIC BLOOD PRESSURE: 127 MMHG | HEART RATE: 81 BPM | HEIGHT: 66 IN | BODY MASS INDEX: 28.28 KG/M2 | RESPIRATION RATE: 12 BRPM | WEIGHT: 176 LBS

## 2023-12-19 DIAGNOSIS — M48.07 SPINAL STENOSIS, LUMBOSACRAL REGION: ICD-10-CM

## 2023-12-19 DIAGNOSIS — M54.9 DORSALGIA, UNSPECIFIED: ICD-10-CM

## 2023-12-19 DIAGNOSIS — M47.817 SPONDYLOSIS WITHOUT MYELOPATHY OR RADICULOPATHY, LUMBOSACRAL REGION: ICD-10-CM

## 2023-12-19 DIAGNOSIS — M54.50 CHRONIC LEFT-SIDED LOW BACK PAIN WITHOUT SCIATICA: ICD-10-CM

## 2023-12-19 DIAGNOSIS — M54.9 DORSALGIA, UNSPECIFIED: Primary | ICD-10-CM

## 2023-12-19 DIAGNOSIS — G89.29 CHRONIC LEFT-SIDED LOW BACK PAIN WITHOUT SCIATICA: ICD-10-CM

## 2023-12-19 PROCEDURE — 1101F PR PT FALLS ASSESS DOC 0-1 FALLS W/OUT INJ PAST YR: ICD-10-PCS | Mod: CPTII,S$GLB,, | Performed by: NURSE PRACTITIONER

## 2023-12-19 PROCEDURE — 3008F PR BODY MASS INDEX (BMI) DOCUMENTED: ICD-10-PCS | Mod: CPTII,S$GLB,, | Performed by: NURSE PRACTITIONER

## 2023-12-19 PROCEDURE — 99999 PR PBB SHADOW E&M-EST. PATIENT-LVL V: CPT | Mod: PBBFAC,,, | Performed by: NURSE PRACTITIONER

## 2023-12-19 PROCEDURE — 3074F PR MOST RECENT SYSTOLIC BLOOD PRESSURE < 130 MM HG: ICD-10-PCS | Mod: CPTII,S$GLB,, | Performed by: NURSE PRACTITIONER

## 2023-12-19 PROCEDURE — 3008F BODY MASS INDEX DOCD: CPT | Mod: CPTII,S$GLB,, | Performed by: NURSE PRACTITIONER

## 2023-12-19 PROCEDURE — 99214 PR OFFICE/OUTPT VISIT, EST, LEVL IV, 30-39 MIN: ICD-10-PCS | Mod: S$GLB,,, | Performed by: NURSE PRACTITIONER

## 2023-12-19 PROCEDURE — 3288F FALL RISK ASSESSMENT DOCD: CPT | Mod: CPTII,S$GLB,, | Performed by: NURSE PRACTITIONER

## 2023-12-19 PROCEDURE — 1125F AMNT PAIN NOTED PAIN PRSNT: CPT | Mod: CPTII,S$GLB,, | Performed by: NURSE PRACTITIONER

## 2023-12-19 PROCEDURE — 1160F RVW MEDS BY RX/DR IN RCRD: CPT | Mod: CPTII,S$GLB,, | Performed by: NURSE PRACTITIONER

## 2023-12-19 PROCEDURE — 72114 X-RAY EXAM L-S SPINE BENDING: CPT | Mod: TC,FY

## 2023-12-19 PROCEDURE — 3078F DIAST BP <80 MM HG: CPT | Mod: CPTII,S$GLB,, | Performed by: NURSE PRACTITIONER

## 2023-12-19 PROCEDURE — 4010F ACE/ARB THERAPY RXD/TAKEN: CPT | Mod: CPTII,S$GLB,, | Performed by: NURSE PRACTITIONER

## 2023-12-19 PROCEDURE — 1159F PR MEDICATION LIST DOCUMENTED IN MEDICAL RECORD: ICD-10-PCS | Mod: CPTII,S$GLB,, | Performed by: NURSE PRACTITIONER

## 2023-12-19 PROCEDURE — 3078F PR MOST RECENT DIASTOLIC BLOOD PRESSURE < 80 MM HG: ICD-10-PCS | Mod: CPTII,S$GLB,, | Performed by: NURSE PRACTITIONER

## 2023-12-19 PROCEDURE — 3288F PR FALLS RISK ASSESSMENT DOCUMENTED: ICD-10-PCS | Mod: CPTII,S$GLB,, | Performed by: NURSE PRACTITIONER

## 2023-12-19 PROCEDURE — 3074F SYST BP LT 130 MM HG: CPT | Mod: CPTII,S$GLB,, | Performed by: NURSE PRACTITIONER

## 2023-12-19 PROCEDURE — 99999 PR PBB SHADOW E&M-EST. PATIENT-LVL V: ICD-10-PCS | Mod: PBBFAC,,, | Performed by: NURSE PRACTITIONER

## 2023-12-19 PROCEDURE — 1125F PR PAIN SEVERITY QUANTIFIED, PAIN PRESENT: ICD-10-PCS | Mod: CPTII,S$GLB,, | Performed by: NURSE PRACTITIONER

## 2023-12-19 PROCEDURE — 1159F MED LIST DOCD IN RCRD: CPT | Mod: CPTII,S$GLB,, | Performed by: NURSE PRACTITIONER

## 2023-12-19 PROCEDURE — 72114 XR LUMBAR SPINE 5 VIEW WITH FLEX AND EXT: ICD-10-PCS | Mod: 26,,, | Performed by: RADIOLOGY

## 2023-12-19 PROCEDURE — 4010F PR ACE/ARB THEARPY RXD/TAKEN: ICD-10-PCS | Mod: CPTII,S$GLB,, | Performed by: NURSE PRACTITIONER

## 2023-12-19 PROCEDURE — 1160F PR REVIEW ALL MEDS BY PRESCRIBER/CLIN PHARMACIST DOCUMENTED: ICD-10-PCS | Mod: CPTII,S$GLB,, | Performed by: NURSE PRACTITIONER

## 2023-12-19 PROCEDURE — 1101F PT FALLS ASSESS-DOCD LE1/YR: CPT | Mod: CPTII,S$GLB,, | Performed by: NURSE PRACTITIONER

## 2023-12-19 PROCEDURE — 99214 OFFICE O/P EST MOD 30 MIN: CPT | Mod: S$GLB,,, | Performed by: NURSE PRACTITIONER

## 2023-12-19 PROCEDURE — 72114 X-RAY EXAM L-S SPINE BENDING: CPT | Mod: 26,,, | Performed by: RADIOLOGY

## 2023-12-19 NOTE — PROGRESS NOTES
Subjective:     Patient ID: Cuate Fair is a 68 y.o. male.    Chief Complaint: Low-back Pain    HPI Mr. Fair is a 68 year old male here for evaluation of low back pain    Complaints of chronic left-sided low back pain without radicular symptoms  No PT or injections in the past  Alternate Tylenol and Advil for pain relief when needed    Past Medical History:   Diagnosis Date    Cataract     Colon polyp     Glaucoma suspect        Past Surgical History:   Procedure Laterality Date    COLONOSCOPY      TONSILLECTOMY         Family History   Problem Relation Age of Onset    Cancer Mother         breast cancer    Cataracts Mother     No Known Problems Father     No Known Problems Daughter     Hemophilia Son     No Known Problems Son     Cancer Brother         lung CA - related to work exposure    Melanoma Neg Hx     Glaucoma Neg Hx     Macular degeneration Neg Hx        Social History     Socioeconomic History    Marital status:    Occupational History    Occupation: building engineers    Tobacco Use    Smoking status: Never    Smokeless tobacco: Never   Substance and Sexual Activity    Alcohol use: No    Drug use: No    Sexual activity: Yes     Partners: Male   Social History Narrative    From [place]     Walking daily - 2 mi per day       Current Outpatient Medications   Medication Sig Dispense Refill    amLODIPine (NORVASC) 5 MG tablet Take 1 tablet (5 mg total) by mouth once daily. 90 tablet 0    atorvastatin (LIPITOR) 40 MG tablet Take 1 tablet (40 mg total) by mouth once daily. 90 tablet 3    benzonatate (TESSALON) 100 MG capsule Take 1 capsule (100 mg total) by mouth 3 (three) times daily as needed for Cough. 30 capsule 0    cetirizine (ZYRTEC) 10 MG tablet Take 1 tablet (10 mg total) by mouth once daily. 30 tablet 0    cyanocobalamin (VITAMIN B-12) 1000 MCG tablet Take 1 tablet (1,000 mcg total) by mouth once daily.      diclofenac sodium (VOLTAREN) 1 % Gel Apply 2 g topically once daily. 450 g 0     fluticasone propionate (FLONASE) 50 mcg/actuation nasal spray 1 spray (50 mcg total) by Each Nostril route once daily. 11.1 mL 0    hydrocortisone 2.5 % cream Apply topically 2 (two) times daily. 3.5 g 0    hydrOXYchloroQUINE (PLAQUENIL) 200 mg tablet 200 mg bid 180 tablet 4    ketoconazole (NIZORAL) 2 % cream Apply topically once daily. 15 g 0    losartan (COZAAR) 50 MG tablet TAKE 1 TABLET(50 MG) BY MOUTH EVERY DAY 90 tablet 3    methocarbamoL (ROBAXIN) 750 MG Tab Take 1 tablet (750 mg total) by mouth nightly as needed (back spasms). 30 tablet 0    sars-cov-2, covid-19, (MODERNA COVID-19) 50 mcg/0.25 ml injection (BOOSTER) Inject into the muscle. 0.25 mL 0    sildenafiL (VIAGRA) 50 MG tablet Take 1 tablet (50 mg total) by mouth daily as needed for Erectile Dysfunction (take 30 min prior to sexual activity). 30 tablet 6    triamcinolone acetonide 0.1% (KENALOG) 0.1 % cream Apply topically 2 (two) times daily. for 10 days 15 g 1     No current facility-administered medications for this visit.       Review of patient's allergies indicates:   Allergen Reactions    Codeine          Review of Systems   Constitutional: Negative for fever.   Cardiovascular:  Negative for chest pain.   Respiratory:  Negative for shortness of breath.    Musculoskeletal:  Positive for back pain.   Gastrointestinal:  Negative for abdominal pain and bowel incontinence.   Genitourinary:  Negative for bladder incontinence.   Neurological:  Negative for numbness and paresthesias.        Objective:     General: Cuate is well-developed, well-nourished, appears stated age, in no acute distress, alert and oriented to time, place and person.     General    Vitals reviewed.  Constitutional: He is oriented to person, place, and time. He appears well-developed and well-nourished.   HENT:   Head: Atraumatic.   Nose: Nose normal.   Eyes: Conjunctivae are normal.   Cardiovascular:  Normal rate.            Pulmonary/Chest: Effort normal.   Neurological:  He is alert and oriented to person, place, and time.   Psychiatric: He has a normal mood and affect. His behavior is normal. Judgment and thought content normal.     General Musculoskeletal Exam   Gait: normal     Back (L-Spine & T-Spine) / Neck (C-Spine) Exam     Back (L-Spine & T-Spine) Range of Motion   Extension:  10   Flexion:  90     Spinal Sensation   Right Side Sensation  L-Spine Level: normal  S-Spine Level: normal  Left Side Sensation  L-Spine Level: normal  S-Spine Level: normal    Other   He has no scoliosis .  Spinal Kyphosis:  Absent      Muscle Strength   Right Upper Extremity   Biceps: 5/5   Deltoid:  5/5  Triceps:  5/5  Left Upper Extremity  Biceps: 5/5   Deltoid:  5/5  Triceps:  5/5  Right Lower Extremity   Hip Flexion: 5/5   Quadriceps:  5/5   Anterior tibial:  5/5   EHL:  5/5  Left Lower Extremity   Hip Flexion: 5/5   Quadriceps:  5/5   Anterior tibial:  5/5   EHL:  5/5    Reflexes     Left Side  Biceps:  2+  Brachioradialis:  2+  Achilles:  2+    Right Side   Biceps:  2+  Brachioradialis:  2+  Achilles:  2+    Vascular Exam     Right Pulses        Carotid:                  2+    Left Pulses        Carotid:                  2+          Assessment:     1. Dorsalgia, unspecified    2. Chronic left-sided low back pain without sciatica    3. Spondylosis without myelopathy or radiculopathy, lumbosacral region    4. Spinal stenosis, lumbosacral region         Plan:        Prior records reviewed today  We discussed back pain and the nature of back pain.  We discussed that it is not one thing that causes the pain but an accumulation of multiple things that we do.   Order lumbar x-ray  Referral to physical therapy for evaluation and treatment of low back pain  Consider lumbar MRI if no improvement with PT   We discussed posture sitting and the importance of trying to sit better.    We discussed the benefits of therapy and exercise and continuing to move.   Return for follow-up in 8 weeks    More than 50%  of the total time  of 45 minutes was spent face to face in counseling on diagnosis and treatment options. I also counseled patient  on common and most usual side effect of prescribed medications.  I reviewed Primary care , and other specialty's notes to better coordinate patient's care. All questions were answered, and patient voiced understanding.      Follow-up: Follow up in about 8 weeks (around 2/13/2024). If there are any questions prior to this, the patient was instructed to contact the office.

## 2024-01-02 ENCOUNTER — OFFICE VISIT (OUTPATIENT)
Dept: URGENT CARE | Facility: CLINIC | Age: 69
End: 2024-01-02
Payer: COMMERCIAL

## 2024-01-02 VITALS
WEIGHT: 170 LBS | BODY MASS INDEX: 25.76 KG/M2 | TEMPERATURE: 98 F | RESPIRATION RATE: 16 BRPM | DIASTOLIC BLOOD PRESSURE: 82 MMHG | HEART RATE: 79 BPM | SYSTOLIC BLOOD PRESSURE: 156 MMHG | OXYGEN SATURATION: 99 % | HEIGHT: 68 IN

## 2024-01-02 DIAGNOSIS — U07.1 COVID-19 VIRUS DETECTED: ICD-10-CM

## 2024-01-02 DIAGNOSIS — U07.1 COVID-19: Primary | ICD-10-CM

## 2024-01-02 DIAGNOSIS — R09.81 SINUS CONGESTION: ICD-10-CM

## 2024-01-02 LAB
CTP QC/QA: YES
SARS-COV-2 AG RESP QL IA.RAPID: POSITIVE

## 2024-01-02 PROCEDURE — 99213 OFFICE O/P EST LOW 20 MIN: CPT | Mod: S$GLB,,, | Performed by: NURSE PRACTITIONER

## 2024-01-02 PROCEDURE — 87811 SARS-COV-2 COVID19 W/OPTIC: CPT | Mod: QW,S$GLB,, | Performed by: NURSE PRACTITIONER

## 2024-01-02 NOTE — LETTER
[address] [facility] [address]  Phone: [phone]  Fax: [phone]          Return to Work/School    Patient: Cuate Fair  YOB: 1955   Date: 01/02/2024     To Whom It May Concern:     Cuate Fair was in contact with/seen in my office on 01/02/2024. COVID-19 is present in our communities across the state. There is limited testing for COVID at this time, so not all patients can be tested. In this situation, your employee meets the following criteria:     Cuate Fair has met the criteria for COVID-19 testing and has a POSITIVE result. He can return to work once they are asymptomatic for 24 hours without the use of fever reducing medications AND at least five days from the start of symptoms (or from the first positive result if they have no symptoms).      If you have any questions or concerns, or if I can be of further assistance, please do not hesitate to contact me.     Sincerely,    Felisha Bullock, NP

## 2024-01-03 NOTE — PROGRESS NOTES
"Subjective:      Patient ID: Cuate Fair is a 68 y.o. male.    Vitals:  height is 5' 8" (1.727 m) and weight is 77.1 kg (170 lb). His temperature is 98.1 °F (36.7 °C). His blood pressure is 156/82 (abnormal) and his pulse is 79. His respiration is 16 and oxygen saturation is 99%.     Chief Complaint: Sinus Problem    Patient presents with c.o sinus congestion for four days. He reports covid exposure and wants a test in clinic today.   Provider note begins below    Patient states he had a family member at [employer] that tested positive for COVID.  He took a COVID test at home that was negative.  He states he is feeling 90% better today.          Sinus Problem  This is a new problem. Episode onset: 4 days. The problem is unchanged. There has been no fever. Associated symptoms include congestion and coughing. Pertinent negatives include no diaphoresis or shortness of breath. Past treatments include nothing.     Constitution: Negative for sweating, fatigue and fever.   HENT:  Positive for congestion.    Respiratory:  Positive for cough. Negative for shortness of breath.       Objective:     Physical Exam   Constitutional: He is oriented to person, place, and time.   HENT:   Head: Normocephalic and atraumatic.   Cardiovascular: Normal rate.   Pulmonary/Chest: Effort normal. No respiratory distress.   Abdominal: Normal appearance.   Neurological: He is alert and oriented to person, place, and time.   Skin: Skin is dry.   Psychiatric: His behavior is normal. Mood normal.         Results for orders placed or performed in visit on 01/02/24   SARS Coronavirus 2 Antigen, POCT Manual Read   Result Value Ref Range    SARS Coronavirus 2 Antigen Positive (A) Negative     Acceptable Yes       Assessment:     1. COVID-19    2. Sinus congestion        Plan:   Covid test positive    Covid risk score    4     Hold cholesterol medicine and Viagra    Patient on Plaquenil, Lipitor and Viagra  Will send in " mulnupravir      You have tested positive for COVID-19 today.           ISOLATION    If you tested positive and do not have symptoms, you must isolate for 5 days starting on the day of the positive test.          If you tested positive and have symptoms, you must isolate for 5 days starting on the day of the first symptoms,  not the day of the positive test.         This is the most important part, both the CDC and the [facility] emphasize that you do not test out of isolation.         After 5 days, if your symptoms have improved and you have not had fever on day 5, you can return to the community on day 6- NO TESTING REQUIRED!          In fact, we do not retest if you were positive in the last 90 days.         After your 5 days of isolation are completed, the CDC recommends strict mask use for the first 5 days that you come out of isolation.          COVID-19  -     Discontinue: nirmatrelvir-ritonavir 300 mg (150 mg x 2)-100 mg copackaged tablets (EUA); Take 3 tablets by mouth 2 (two) times daily for 5 days. Each dose contains 2 nirmatrelvir (pink tablets) and 1 ritonavir (white tablet). Take all 3 tablets together  Dispense: 30 tablet; Refill: 0  -     molnupiravir 200 mg capsule (EUA); Take 4 capsules (800 mg total) by mouth every 12 (twelve) hours. for 5 days  Dispense: 40 capsule; Refill: 0    Sinus congestion  -     SARS Coronavirus 2 Antigen, POCT Manual Read

## 2024-01-19 ENCOUNTER — TELEPHONE (OUTPATIENT)
Dept: INTERNAL MEDICINE | Facility: CLINIC | Age: 69
End: 2024-01-19
Payer: COMMERCIAL

## 2024-01-19 NOTE — TELEPHONE ENCOUNTER
----- Message from Leidy Sanchezry sent at 1/19/2024  8:38 AM CST -----  Regarding: call back/insurance  Name of Who is Calling: Lata with [facility]         What is the request in detail: isn't sure if his insurance  ID will make it in time for appt, but incase it doesn't would like to make sure you all can take it off of his phone, can call back provider service line to verify insurance : [phone].    Insurance ID: [id number] Policy #:[id number]    Can the clinic reply by [facility]: no          What Number to Call Back if not in [facility]: [phone]

## 2024-01-19 NOTE — TELEPHONE ENCOUNTER
Received message back from them stating:    [10:08 AM] Mone Hidalgo  Ok, patient's insurance was already verified in our system, he  can be seen without the physical card, no worries   heart 1  [10:08 AM] Mone Hidalgo  We will just advise him he can bring the card to the next appointment after he receives the card

## 2024-01-20 DIAGNOSIS — I10 HTN (HYPERTENSION), BENIGN: ICD-10-CM

## 2024-01-20 DIAGNOSIS — E78.00 HYPERCHOLESTEROLEMIA: ICD-10-CM

## 2024-01-20 NOTE — TELEPHONE ENCOUNTER
No care due was identified.  [facility] Embedded Care Due Messages. Reference number: [id number].   1/20/2024 3:49:36 AM CST

## 2024-01-22 RX ORDER — AMLODIPINE BESYLATE 5 MG/1
5 TABLET ORAL
Qty: 90 TABLET | Refills: 0 | Status: SHIPPED | OUTPATIENT
Start: 2024-01-22 | End: 2024-04-26

## 2024-01-22 NOTE — TELEPHONE ENCOUNTER
Refill Routing Note   Medication(s) are not appropriate for processing by [facility] for the following reason(s):        Required vitals abnormal  Patient not seen by provider within 15 months    [facility] action(s):  Defer               Appointments  past 12m or future 3m with PCP    Date Provider   Last Visit   6/23/2022 Carolyne Beth MD   Next Visit   1/23/2024 Carolyne Beth MD   ED visits in past 90 days: 0        Note composed:8:24 AM 01/22/2024

## 2024-01-23 ENCOUNTER — LAB VISIT (OUTPATIENT)
Dept: LAB | Facility: OTHER | Age: 69
End: 2024-01-23
Attending: INTERNAL MEDICINE
Payer: COMMERCIAL

## 2024-01-23 ENCOUNTER — OFFICE VISIT (OUTPATIENT)
Dept: INTERNAL MEDICINE | Facility: CLINIC | Age: 69
End: 2024-01-23
Attending: INTERNAL MEDICINE
Payer: COMMERCIAL

## 2024-01-23 VITALS
HEIGHT: 68 IN | HEART RATE: 75 BPM | WEIGHT: 172.63 LBS | SYSTOLIC BLOOD PRESSURE: 122 MMHG | DIASTOLIC BLOOD PRESSURE: 68 MMHG | BODY MASS INDEX: 26.16 KG/M2 | OXYGEN SATURATION: 96 %

## 2024-01-23 DIAGNOSIS — L52 ERYTHEMA NODOSUM: ICD-10-CM

## 2024-01-23 DIAGNOSIS — Z12.5 SCREENING FOR PROSTATE CANCER: ICD-10-CM

## 2024-01-23 DIAGNOSIS — F43.22 ADJUSTMENT DISORDER WITH ANXIETY: ICD-10-CM

## 2024-01-23 DIAGNOSIS — Z00.00 ANNUAL PHYSICAL EXAM: Primary | ICD-10-CM

## 2024-01-23 DIAGNOSIS — Z00.00 ANNUAL PHYSICAL EXAM: ICD-10-CM

## 2024-01-23 DIAGNOSIS — D69.6 THROMBOCYTOPENIA: ICD-10-CM

## 2024-01-23 DIAGNOSIS — Z13.31 POSITIVE DEPRESSION SCREENING: ICD-10-CM

## 2024-01-23 DIAGNOSIS — Z51.81 ENCOUNTER FOR MEDICATION MONITORING: ICD-10-CM

## 2024-01-23 DIAGNOSIS — E53.8 B12 DEFICIENCY: ICD-10-CM

## 2024-01-23 DIAGNOSIS — F41.8 SITUATIONAL ANXIETY: ICD-10-CM

## 2024-01-23 LAB
ALBUMIN SERPL BCP-MCNC: 4.6 G/DL (ref 3.5–5.2)
ALP SERPL-CCNC: 60 U/L (ref 55–135)
ALT SERPL W/O P-5'-P-CCNC: 20 U/L (ref 10–44)
ANION GAP SERPL CALC-SCNC: 6 MMOL/L (ref 8–16)
AST SERPL-CCNC: 20 U/L (ref 10–40)
BASOPHILS # BLD AUTO: 0.04 K/UL (ref 0–0.2)
BASOPHILS NFR BLD: 1 % (ref 0–1.9)
BILIRUB SERPL-MCNC: 1 MG/DL (ref 0.1–1)
BUN SERPL-MCNC: 14 MG/DL (ref 8–23)
CALCIUM SERPL-MCNC: 9.4 MG/DL (ref 8.7–10.5)
CHLORIDE SERPL-SCNC: 104 MMOL/L (ref 95–110)
CHOLEST SERPL-MCNC: 160 MG/DL (ref 120–199)
CHOLEST/HDLC SERPL: 3.3 {RATIO} (ref 2–5)
CO2 SERPL-SCNC: 31 MMOL/L (ref 23–29)
COMPLEXED PSA SERPL-MCNC: 2.5 NG/ML (ref 0–4)
CREAT SERPL-MCNC: 1.2 MG/DL (ref 0.5–1.4)
DIFFERENTIAL METHOD BLD: ABNORMAL
EOSINOPHIL # BLD AUTO: 0.2 K/UL (ref 0–0.5)
EOSINOPHIL NFR BLD: 5.4 % (ref 0–8)
ERYTHROCYTE [DISTWIDTH] IN BLOOD BY AUTOMATED COUNT: 12.9 % (ref 11.5–14.5)
EST. GFR  (NO RACE VARIABLE): >60 ML/MIN/1.73 M^2
GLUCOSE SERPL-MCNC: 83 MG/DL (ref 70–110)
HCT VFR BLD AUTO: 49.3 % (ref 40–54)
HDLC SERPL-MCNC: 49 MG/DL (ref 40–75)
HDLC SERPL: 30.6 % (ref 20–50)
HGB BLD-MCNC: 16.4 G/DL (ref 14–18)
IMM GRANULOCYTES # BLD AUTO: 0.01 K/UL (ref 0–0.04)
IMM GRANULOCYTES NFR BLD AUTO: 0.2 % (ref 0–0.5)
LDLC SERPL CALC-MCNC: 99.8 MG/DL (ref 63–159)
LYMPHOCYTES # BLD AUTO: 1.8 K/UL (ref 1–4.8)
LYMPHOCYTES NFR BLD: 43 % (ref 18–48)
MCH RBC QN AUTO: 29.4 PG (ref 27–31)
MCHC RBC AUTO-ENTMCNC: 33.3 G/DL (ref 32–36)
MCV RBC AUTO: 89 FL (ref 82–98)
MONOCYTES # BLD AUTO: 0.5 K/UL (ref 0.3–1)
MONOCYTES NFR BLD: 11.3 % (ref 4–15)
NEUTROPHILS # BLD AUTO: 1.6 K/UL (ref 1.8–7.7)
NEUTROPHILS NFR BLD: 39.1 % (ref 38–73)
NONHDLC SERPL-MCNC: 111 MG/DL
NRBC BLD-RTO: 0 /100 WBC
PLATELET # BLD AUTO: 154 K/UL (ref 150–450)
PMV BLD AUTO: 10.8 FL (ref 9.2–12.9)
POTASSIUM SERPL-SCNC: 3.9 MMOL/L (ref 3.5–5.1)
PROT SERPL-MCNC: 7.6 G/DL (ref 6–8.4)
RBC # BLD AUTO: 5.57 M/UL (ref 4.6–6.2)
SODIUM SERPL-SCNC: 141 MMOL/L (ref 136–145)
TRIGL SERPL-MCNC: 56 MG/DL (ref 30–150)
TSH SERPL DL<=0.005 MIU/L-ACNC: 1.07 UIU/ML (ref 0.4–4)
WBC # BLD AUTO: 4.07 K/UL (ref 3.9–12.7)

## 2024-01-23 PROCEDURE — 84153 ASSAY OF PSA TOTAL: CPT | Performed by: INTERNAL MEDICINE

## 2024-01-23 PROCEDURE — 36415 COLL VENOUS BLD VENIPUNCTURE: CPT | Performed by: INTERNAL MEDICINE

## 2024-01-23 PROCEDURE — 99999 PR PBB SHADOW E&M-EST. PATIENT-LVL V: CPT | Mod: PBBFAC,,, | Performed by: INTERNAL MEDICINE

## 2024-01-23 PROCEDURE — 1160F RVW MEDS BY RX/DR IN RCRD: CPT | Mod: CPTII,S$GLB,, | Performed by: INTERNAL MEDICINE

## 2024-01-23 PROCEDURE — 85025 COMPLETE CBC W/AUTO DIFF WBC: CPT | Performed by: INTERNAL MEDICINE

## 2024-01-23 PROCEDURE — 3078F DIAST BP <80 MM HG: CPT | Mod: CPTII,S$GLB,, | Performed by: INTERNAL MEDICINE

## 2024-01-23 PROCEDURE — 84443 ASSAY THYROID STIM HORMONE: CPT | Performed by: INTERNAL MEDICINE

## 2024-01-23 PROCEDURE — 1126F AMNT PAIN NOTED NONE PRSNT: CPT | Mod: CPTII,S$GLB,, | Performed by: INTERNAL MEDICINE

## 2024-01-23 PROCEDURE — 99397 PER PM REEVAL EST PAT 65+ YR: CPT | Mod: S$GLB,,, | Performed by: INTERNAL MEDICINE

## 2024-01-23 PROCEDURE — 3008F BODY MASS INDEX DOCD: CPT | Mod: CPTII,S$GLB,, | Performed by: INTERNAL MEDICINE

## 2024-01-23 PROCEDURE — 1159F MED LIST DOCD IN RCRD: CPT | Mod: CPTII,S$GLB,, | Performed by: INTERNAL MEDICINE

## 2024-01-23 PROCEDURE — 3074F SYST BP LT 130 MM HG: CPT | Mod: CPTII,S$GLB,, | Performed by: INTERNAL MEDICINE

## 2024-01-23 PROCEDURE — 80061 LIPID PANEL: CPT | Performed by: INTERNAL MEDICINE

## 2024-01-23 PROCEDURE — 80053 COMPREHEN METABOLIC PANEL: CPT | Performed by: INTERNAL MEDICINE

## 2024-01-23 PROCEDURE — 3288F FALL RISK ASSESSMENT DOCD: CPT | Mod: CPTII,S$GLB,, | Performed by: INTERNAL MEDICINE

## 2024-01-23 PROCEDURE — 1101F PT FALLS ASSESS-DOCD LE1/YR: CPT | Mod: CPTII,S$GLB,, | Performed by: INTERNAL MEDICINE

## 2024-01-23 NOTE — PROGRESS NOTES
Subjective:   Patient ID: Cuate Fair is a 68 y.o. male  Chief complaint:   Chief Complaint   Patient presents with    Blurred Vision     F/u; still having issues; wondering if BP related    Hypertension     Ranges from high to low        HPI  Here for annual exam     Covid resolved 1/2/2024  Has eye appt next month     HTN:   Bp fluctuating at times   145/75, 100/60  Checking bp at home - wrsit cuff but checking while in bed   Reviewed proper tech and will start this   No further LH with current regimen     EN, + MARY - on plaquenil:  Reviewed that due for rheum f/u and eye exam annually while on med   - still less episodes of EN with plaquenil  - still on plaquenil and taking med consistently and no changes in dosing   - followed by rheum and derm   Previously:   - quant gold negative  - cxr wnl   TSH wnl  - as above   - MARY positive 1:160 homogenous with neg reflex   - pt denies joint pain, rashes, morning stiffness    - does have hx of hep b that he was prev told that his body cleared   - hep b core ab +, hep b surf ab +  - hep b surf antigen neg and hep b e ant neg  - HIV neg    Thrombocytopenia and neutropenia prior to this dx - spep and immunifix wnl  immunoglob wnl:  Labs stable   - seen by h/o 12/2019 - no indication for bm bx   - psa wnl  - no easy bruising or bleeding, hematuria or GIB  - No diarrhea, sore throat, fevers, joint swelling or pain or inc warmth or redness      B12 def: 984 <- 256   Taking suppl and improved     HM:   Covid - just had covid 1/2024  Flu  rsv    Stress:   Significant work stress - hoping to retire after son completes degree at [employer]   Wife running    Rare vacations - going in March   Lost mom last year   Not getting reg exercise    Very stressed - no si/hi/grace  Discussed trial of prozac in future - defers today     Review of Systems    Objective:  Vitals:    01/23/24 1233 01/23/24 1319   BP: (!) 142/94 122/68   BP Location: Left arm    Patient Position:  "Sitting    Pulse: 75    SpO2: 96%    Weight: 78.3 kg (172 lb 9.9 oz)    Height: 5' 8" (1.727 m)      Body mass index is 26.25 kg/m².    Physical Exam  Vitals reviewed.   Constitutional:       Appearance: Normal appearance. He is well-developed.   HENT:      Head: Normocephalic and atraumatic.      Right Ear: Tympanic membrane, ear canal and external ear normal.      Left Ear: Tympanic membrane, ear canal and external ear normal.      Nose: Nose normal. No congestion.      Mouth/Throat:      Mouth: Mucous membranes are moist.      Pharynx: Oropharynx is clear. No oropharyngeal exudate.   Eyes:      Extraocular Movements: Extraocular movements intact.      Conjunctiva/sclera: Conjunctivae normal.   Neck:      Thyroid: No thyromegaly.   Cardiovascular:      Rate and Rhythm: Normal rate and regular rhythm.      Pulses: Normal pulses.      Heart sounds: Normal heart sounds.   Pulmonary:      Effort: Pulmonary effort is normal.      Breath sounds: Normal breath sounds.   Abdominal:      General: Bowel sounds are normal.      Palpations: Abdomen is soft.   Musculoskeletal:         General: No swelling or tenderness.      Cervical back: Neck supple.   Lymphadenopathy:      Cervical: No cervical adenopathy.   Skin:     General: Skin is warm and dry.      Capillary Refill: Capillary refill takes less than 2 seconds.   Neurological:      General: No focal deficit present.      Mental Status: He is alert and oriented to person, place, and time.   Psychiatric:         Mood and Affect: Mood normal.         Behavior: Behavior normal.         Thought Content: Thought content normal.         Judgment: Judgment normal.         Assessment:  1. Annual physical exam    2. Screening for prostate cancer    3. Erythema nodosum    4. Adjustment disorder with anxiety    5. Positive depression screening    6. Thrombocytopenia    7. B12 deficiency    8. Situational anxiety    9. Encounter for medication monitoring        Plan:  Cuate was " seen today for blurred vision and hypertension.    Diagnoses and all orders for this visit:    Annual physical exam  -     TSH; Future  -     Lipid Panel; Future  -     CBC Auto Differential; Future  -     Comprehensive Metabolic Panel; Future  -     PSA, Screening; Future  Recommend daily sunscreen, cardiovascular exercise min 30 min 5 days per week. Seatbelts routinely.    Screening for prostate cancer  -     PSA, Screening; Future    Erythema nodosum  -     Ambulatory referral/consult to Rheumatology; Future  Stable   Cont med   F/u for eye exam     Adjustment disorder with anxiety  -     Ambulatory referral/consult to Primary Care Behavioral Health (Non-Opioids); Future  Discussed trial of prozac and counseling  - defers for now   If reconsiders will let me know   No si/ih/edwards  Has good fam and social support through [employer]     Positive depression screening  Comments:  I have reviewed the positive depression score which warrants active treatment with psychotherapy and/or medications.  Discussed trial of prozac and counseling  - defers for now   If reconsiders will let me know   No si/ih/edwards  Has good fam and social support through [employer]     Thrombocytopenia  Stable   Monitor   Cont suppl     B12 deficiency  Stable   Cont suppl     Situational anxiety  As above     Check approp labs - difficult schedule so will do today - hayde mohan 6am today    Health Maintenance   Topic Date Due    PROSTATE-SPECIFIC ANTIGEN  01/23/2025    High Dose Statin  01/23/2025    Lipid Panel  01/23/2025    TETANUS VACCINE  11/09/2028    Colorectal Cancer Screening  06/07/2031    Hepatitis C Screening  Completed    Shingles Vaccine  Completed

## 2024-01-24 ENCOUNTER — TELEPHONE (OUTPATIENT)
Dept: INTERNAL MEDICINE | Facility: CLINIC | Age: 69
End: 2024-01-24
Payer: COMMERCIAL

## 2024-01-24 NOTE — TELEPHONE ENCOUNTER
Patient was left a detailed message regarding upcoming appointment for Rheumatology.  Patient is to return my call if he has to cancel/reschedule.

## 2024-01-31 NOTE — PROGRESS NOTES
Please let pt know that labs are stable and in acceptable range overall. Please continue his current regimen. PG

## 2024-02-01 ENCOUNTER — TELEPHONE (OUTPATIENT)
Dept: INTERNAL MEDICINE | Facility: CLINIC | Age: 69
End: 2024-02-01
Payer: COMMERCIAL

## 2024-02-01 NOTE — TELEPHONE ENCOUNTER
----- Message from Carolyne Beth MD sent at 1/31/2024  4:45 PM CST -----  Please let pt know that labs are stable and in acceptable range overall. Please continue his current regimen. PG

## 2024-02-02 ENCOUNTER — TELEPHONE (OUTPATIENT)
Dept: OPHTHALMOLOGY | Facility: CLINIC | Age: 69
End: 2024-02-02
Payer: COMMERCIAL

## 2024-02-06 ENCOUNTER — OFFICE VISIT (OUTPATIENT)
Dept: OPTOMETRY | Facility: CLINIC | Age: 69
End: 2024-02-06
Payer: COMMERCIAL

## 2024-02-06 DIAGNOSIS — H52.203 ASTIGMATISM OF BOTH EYES WITH PRESBYOPIA: ICD-10-CM

## 2024-02-06 DIAGNOSIS — M79.3 PANNICULITIS: ICD-10-CM

## 2024-02-06 DIAGNOSIS — Z79.899 ENCOUNTER FOR LONG-TERM (CURRENT) USE OF HIGH-RISK MEDICATION: Primary | ICD-10-CM

## 2024-02-06 DIAGNOSIS — H40.013 OPEN ANGLE WITH BORDERLINE FINDINGS OF BOTH EYES: ICD-10-CM

## 2024-02-06 DIAGNOSIS — H52.4 ASTIGMATISM OF BOTH EYES WITH PRESBYOPIA: ICD-10-CM

## 2024-02-06 PROCEDURE — 1160F RVW MEDS BY RX/DR IN RCRD: CPT | Mod: CPTII,S$GLB,, | Performed by: OPTOMETRIST

## 2024-02-06 PROCEDURE — 92014 COMPRE OPH EXAM EST PT 1/>: CPT | Mod: S$GLB,,, | Performed by: OPTOMETRIST

## 2024-02-06 PROCEDURE — 1159F MED LIST DOCD IN RCRD: CPT | Mod: CPTII,S$GLB,, | Performed by: OPTOMETRIST

## 2024-02-06 PROCEDURE — 92134 CPTRZ OPH DX IMG PST SGM RTA: CPT | Mod: S$GLB,,, | Performed by: OPTOMETRIST

## 2024-02-06 PROCEDURE — 3288F FALL RISK ASSESSMENT DOCD: CPT | Mod: CPTII,S$GLB,, | Performed by: OPTOMETRIST

## 2024-02-06 PROCEDURE — 1126F AMNT PAIN NOTED NONE PRSNT: CPT | Mod: CPTII,S$GLB,, | Performed by: OPTOMETRIST

## 2024-02-06 PROCEDURE — 99999 PR PBB SHADOW E&M-EST. PATIENT-LVL III: CPT | Mod: PBBFAC,,, | Performed by: OPTOMETRIST

## 2024-02-06 PROCEDURE — 1101F PT FALLS ASSESS-DOCD LE1/YR: CPT | Mod: CPTII,S$GLB,, | Performed by: OPTOMETRIST
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
## 2024-02-20 ENCOUNTER — TELEPHONE (OUTPATIENT)
Dept: OPTOMETRY | Facility: CLINIC | Age: 69
End: 2024-02-20
Payer: COMMERCIAL

## 2024-03-21 ENCOUNTER — CLINICAL SUPPORT (OUTPATIENT)
Dept: OPHTHALMOLOGY | Facility: CLINIC | Age: 69
End: 2024-03-21
Payer: COMMERCIAL

## 2024-03-21 DIAGNOSIS — H40.013 OPEN ANGLE WITH BORDERLINE FINDINGS OF BOTH EYES: ICD-10-CM

## 2024-03-21 DIAGNOSIS — Z79.899 ENCOUNTER FOR LONG-TERM (CURRENT) USE OF HIGH-RISK MEDICATION: ICD-10-CM

## 2024-03-21 NOTE — PROGRESS NOTES
VISUAL FIELD 10-2 SS-OU-DONE/AD  OD-REL-FIX-COOP-GOOD/AD  OS-REL-GOOD-FIX-POOR-COOP-GOOD/AD    PT HAS NO KNOWN ALLERGIES TO LATEX OR ADHESIVES./AD    MRX: OD -0.50 +0.75 X 70            OS     0   +0.50 X 173

## 2024-03-22 DIAGNOSIS — H40.013 OPEN ANGLE WITH BORDERLINE FINDINGS OF BOTH EYES: Primary | ICD-10-CM

## 2024-03-22 DIAGNOSIS — L52 ERYTHEMA NODOSUM: ICD-10-CM

## 2024-03-22 DIAGNOSIS — Z79.899 ENCOUNTER FOR LONG-TERM (CURRENT) USE OF HIGH-RISK MEDICATION: ICD-10-CM

## 2024-03-22 NOTE — PROGRESS NOTES
Assessment /Plan     For exam results, see Encounter Report.        Open angle with borderline findings of both eyes              - LIOP normotensive OU  (10/13) with thinner than average CCT (509/520)   - OCT (3/21/22) OD borderline inf thinning on TSNIT OS WNL    - Monitor without glaucoma meds at this time    - RTC 6 months for VF 24-2 and IOP check.     Encounter for long-term (current) use of high-risk medication  Erythema nodosum              - Long term Plaquenil usage               - LOCT macula WNL, normal foveal contour OU              - VF 10-2 OU OD inferior cluster defect, questionable rim defect  - Monitor annually   Called and spoke to patient 3/22/24 regarding findings and patient expresses understanding

## 2024-04-23 ENCOUNTER — OFFICE VISIT (OUTPATIENT)
Dept: INTERNAL MEDICINE | Facility: CLINIC | Age: 69
End: 2024-04-23
Attending: INTERNAL MEDICINE
Payer: COMMERCIAL

## 2024-04-23 VITALS
DIASTOLIC BLOOD PRESSURE: 80 MMHG | OXYGEN SATURATION: 97 % | WEIGHT: 170.88 LBS | HEIGHT: 68 IN | BODY MASS INDEX: 25.9 KG/M2 | HEART RATE: 73 BPM | SYSTOLIC BLOOD PRESSURE: 125 MMHG

## 2024-04-23 DIAGNOSIS — G89.29 CHRONIC LOW BACK PAIN WITH SCIATICA, SCIATICA LATERALITY UNSPECIFIED, UNSPECIFIED BACK PAIN LATERALITY: Primary | ICD-10-CM

## 2024-04-23 DIAGNOSIS — M54.40 CHRONIC LOW BACK PAIN WITH SCIATICA, SCIATICA LATERALITY UNSPECIFIED, UNSPECIFIED BACK PAIN LATERALITY: Primary | ICD-10-CM

## 2024-04-23 DIAGNOSIS — I10 ESSENTIAL HYPERTENSION: ICD-10-CM

## 2024-04-23 PROCEDURE — 3288F FALL RISK ASSESSMENT DOCD: CPT | Mod: CPTII,S$GLB,, | Performed by: INTERNAL MEDICINE

## 2024-04-23 PROCEDURE — 3079F DIAST BP 80-89 MM HG: CPT | Mod: CPTII,S$GLB,, | Performed by: INTERNAL MEDICINE

## 2024-04-23 PROCEDURE — 1126F AMNT PAIN NOTED NONE PRSNT: CPT | Mod: CPTII,S$GLB,, | Performed by: INTERNAL MEDICINE

## 2024-04-23 PROCEDURE — 1159F MED LIST DOCD IN RCRD: CPT | Mod: CPTII,S$GLB,, | Performed by: INTERNAL MEDICINE

## 2024-04-23 PROCEDURE — 3074F SYST BP LT 130 MM HG: CPT | Mod: CPTII,S$GLB,, | Performed by: INTERNAL MEDICINE

## 2024-04-23 PROCEDURE — 1101F PT FALLS ASSESS-DOCD LE1/YR: CPT | Mod: CPTII,S$GLB,, | Performed by: INTERNAL MEDICINE

## 2024-04-23 PROCEDURE — 3008F BODY MASS INDEX DOCD: CPT | Mod: CPTII,S$GLB,, | Performed by: INTERNAL MEDICINE

## 2024-04-23 PROCEDURE — 99999 PR PBB SHADOW E&M-EST. PATIENT-LVL V: CPT | Mod: PBBFAC,,, | Performed by: INTERNAL MEDICINE

## 2024-04-23 PROCEDURE — 99214 OFFICE O/P EST MOD 30 MIN: CPT | Mod: S$GLB,,, | Performed by: INTERNAL MEDICINE

## 2024-04-23 PROCEDURE — 1160F RVW MEDS BY RX/DR IN RCRD: CPT | Mod: CPTII,S$GLB,, | Performed by: INTERNAL MEDICINE

## 2024-04-23 PROCEDURE — 4010F ACE/ARB THERAPY RXD/TAKEN: CPT | Mod: CPTII,S$GLB,, | Performed by: INTERNAL MEDICINE

## 2024-04-23 NOTE — PROGRESS NOTES
Subjective:   Patient ID: Cuate Fair is a 68 y.o. male  Chief complaint:   Chief Complaint   Patient presents with    Hypertension     F/u    Back Pain     Saw back and spine in Dec, but still having pain       HPI  Here for uc appt for HTN and chronic back apin     Saw spine 1/2024   Xray completed   MRI ordered - ?? Not completed   Unable to attend PT due to time constraints but will look into different locations and let me know if one is condusive to work schedule  Alt apap and advil   No PT or injections  Had massages and mildly helpful     HTN:   Bp fluctuating at times   145/75, 100/60  Checking bp at home - wrsit cuff but checking while in bed   Reviewed proper tech and will start this   No further LH with current regimen     ### not addressed today ######    Utd 1/2024 for annual exam     Covid resolved 1/2/2024  Has eye appt next month     EN, + MARY - on plaquenil:  Reviewed that due for rheum f/u and eye exam annually while on med   - still less episodes of EN with plaquenil  - still on plaquenil and taking med consistently and no changes in dosing   - followed by rheum and derm   Previously:   - quant gold negative  - cxr wnl   TSH wnl  - as above   - MARY positive 1:160 homogenous with neg reflex   - pt denies joint pain, rashes, morning stiffness    - does have hx of hep b that he was prev told that his body cleared   - hep b core ab +, hep b surf ab +  - hep b surf antigen neg and hep b e ant neg  - HIV neg    Thrombocytopenia and neutropenia prior to this dx - spep and immunifix wnl  immunoglob wnl:  Labs stable   - seen by h/o 12/2019 - no indication for bm bx   - psa wnl  - no easy bruising or bleeding, hematuria or GIB  - No diarrhea, sore throat, fevers, joint swelling or pain or inc warmth or redness      B12 def: 984 <- 256   Taking suppl and improved     #######################    HM:   Covid - just had covid 1/2024  Flu  rsv      Review of Systems    Objective:  Vitals:    04/23/24  "1231 04/23/24 1339   BP: (!) 136/94 125/80   BP Location: Left arm    Patient Position: Sitting    Pulse: 73    SpO2: 97%    Weight: 77.5 kg (170 lb 13.7 oz)    Height: 5' 8" (1.727 m)      Body mass index is 25.98 kg/m².    Physical Exam  Vitals reviewed.   Constitutional:       Appearance: He is well-developed.   HENT:      Head: Normocephalic and atraumatic.   Eyes:      Extraocular Movements: Extraocular movements intact.      Conjunctiva/sclera: Conjunctivae normal.   Cardiovascular:      Rate and Rhythm: Normal rate and regular rhythm.      Pulses: Normal pulses.      Heart sounds: Normal heart sounds.   Pulmonary:      Effort: Pulmonary effort is normal.      Breath sounds: Normal breath sounds.   Abdominal:      General: Bowel sounds are normal.      Palpations: Abdomen is soft.   Musculoskeletal:         General: Tenderness present. No swelling.      Cervical back: Neck supple.      Comments: No spinal ttp   + ttp of lumbar paraspinal mm   Strength 5/5   Sensation to light touch in tact    Lymphadenopathy:      Cervical: No cervical adenopathy.   Skin:     General: Skin is warm and dry.      Capillary Refill: Capillary refill takes less than 2 seconds.   Neurological:      General: No focal deficit present.      Mental Status: He is alert and oriented to person, place, and time.      Deep Tendon Reflexes:      Reflex Scores:       Patellar reflexes are 2+ on the right side and 2+ on the left side.       Achilles reflexes are 2+ on the right side and 2+ on the left side.  Psychiatric:         Behavior: Behavior normal.         Thought Content: Thought content normal.         Judgment: Judgment normal.       Assessment:  1. Chronic low back pain with sciatica, sciatica laterality unspecified, unspecified back pain laterality    2. Essential hypertension      Plan:    Chronic low back pain with sciatica, sciatica laterality unspecified, unspecified back pain laterality  -     Ambulatory referral/consult to " Physical/Occupational Therapy; Future; Expected date: 04/30/2024  Schedule MRI   Trial of PT   Apap arthritis prn   Alt with aleve with pecid / food for 2 weeks   Will plan to refer to pain mgmt pending clinical course/MRI     Essential hypertension  Stable   Cont med         Health Maintenance   Topic Date Due    PROSTATE-SPECIFIC ANTIGEN  01/23/2025    Lipid Panel  01/23/2025    High Dose Statin  04/23/2025    TETANUS VACCINE  11/09/2028    Colorectal Cancer Screening  06/07/2031    Hepatitis C Screening  Completed    Shingles Vaccine  Completed

## 2024-04-26 DIAGNOSIS — E78.00 HYPERCHOLESTEROLEMIA: ICD-10-CM

## 2024-04-26 DIAGNOSIS — I10 HTN (HYPERTENSION), BENIGN: ICD-10-CM

## 2024-04-26 RX ORDER — AMLODIPINE BESYLATE 5 MG/1
5 TABLET ORAL
Qty: 90 TABLET | Refills: 3 | Status: SHIPPED | OUTPATIENT
Start: 2024-04-26

## 2024-04-26 NOTE — TELEPHONE ENCOUNTER
Cuate Fair  is requesting a refill authorization.  Brief Assessment and Rationale for Refill:  Approve     Medication Therapy Plan:         Comments:     Note composed:8:26 AM 04/26/2024

## 2024-04-26 NOTE — TELEPHONE ENCOUNTER
No care due was identified.  [facility] Embedded Care Due Messages. Reference number: [id number].   4/26/2024 3:47:05 AM CDT

## 2024-05-08 ENCOUNTER — HOSPITAL ENCOUNTER (OUTPATIENT)
Dept: RADIOLOGY | Facility: OTHER | Age: 69
Discharge: HOME | End: 2024-05-08
Attending: NURSE PRACTITIONER
Payer: COMMERCIAL

## 2024-05-08 DIAGNOSIS — M48.07 SPINAL STENOSIS, LUMBOSACRAL REGION: ICD-10-CM

## 2024-05-08 PROCEDURE — 72148 MRI LUMBAR SPINE W/O DYE: CPT | Mod: TC

## 2024-05-08 PROCEDURE — 72148 MRI LUMBAR SPINE W/O DYE: CPT | Mod: 26,,, | Performed by: RADIOLOGY

## 2024-05-15 ENCOUNTER — TELEPHONE (OUTPATIENT)
Dept: SPINE | Facility: CLINIC | Age: 69
End: 2024-05-15
Payer: COMMERCIAL

## 2024-05-20 ENCOUNTER — TELEPHONE (OUTPATIENT)
Dept: INTERNAL MEDICINE | Facility: CLINIC | Age: 69
End: 2024-05-20
Payer: COMMERCIAL

## 2024-05-20 DIAGNOSIS — N28.1 RENAL CYST: Primary | ICD-10-CM

## 2024-05-20 NOTE — TELEPHONE ENCOUNTER
----- Message from Kori Duron NP sent at 5/9/2024  9:52 AM CDT -----  Lumbar MRI showed incidental findings recommending renal ultrasound    Foci of T2 signal hyperintensity both kidneys may reflect renal cysts.  Findings can be further evaluated with renal ultrasound

## 2024-05-20 NOTE — TELEPHONE ENCOUNTER
Please let pt know that I received a message from his specialists that possible kidney cysts were seen on his mri of lumbar spine. I reviewed his chart and he had a ct scan several years ago in 2013 that showed kidney cysts.    I recommend checking a kidney ultrasound nonurgently to view these further and to determine if any further monitoring is recommended for this or not  - please arrange at his convenience the ultrasound in a few weeks

## 2024-05-21 NOTE — TELEPHONE ENCOUNTER
"Spoke to patient to relay Dr. Beth's message:    "Please let pt know that I received a message from his specialists that possible kidney cysts were seen on his mri of lumbar spine. I reviewed his chart and he had a ct scan several years ago in 2013 that showed kidney cysts.     I recommend checking a kidney ultrasound nonurgently to view these further and to determine if any further monitoring is recommended for this or not  - please arrange at his convenience the ultrasound in a few weeks"     Patient verbalized understanding. Scheduled him for Monday May 27, 2024 at 8:15 am.  "

## 2024-05-27 ENCOUNTER — HOSPITAL ENCOUNTER (OUTPATIENT)
Dept: RADIOLOGY | Facility: OTHER | Age: 69
Discharge: HOME | End: 2024-05-27
Attending: INTERNAL MEDICINE
Payer: COMMERCIAL

## 2024-05-27 DIAGNOSIS — N28.1 RENAL CYST: ICD-10-CM

## 2024-05-27 PROCEDURE — 76770 US EXAM ABDO BACK WALL COMP: CPT | Mod: 26,,, | Performed by: STUDENT IN AN ORGANIZED HEALTH CARE EDUCATION/TRAINING PROGRAM

## 2024-05-27 PROCEDURE — 76770 US EXAM ABDO BACK WALL COMP: CPT | Mod: TC

## 2024-05-28 ENCOUNTER — TELEPHONE (OUTPATIENT)
Dept: INTERNAL MEDICINE | Facility: CLINIC | Age: 69
End: 2024-05-28
Payer: COMMERCIAL

## 2024-05-28 NOTE — TELEPHONE ENCOUNTER
Spoke to Mr. Fair and informed him per Dr. Gustafson that   Please let pt know he has a 4 mm nonobstructing right renal stone and multiple renal cysts. Thank you!   Patient still confused and wanted to know what did that mean.  Schedule a Audio appt with Provider to discuss labs

## 2024-05-28 NOTE — TELEPHONE ENCOUNTER
----- Message from Ondina Jaramillo sent at 5/28/2024  8:28 AM CDT -----  Regarding: call back request  Name of caller: mikhail       What is the requesting detail: pt requesting a call back regarding her xray results. Please advise       Can the clinic reply by [facility]:       What number to call back:[phone]

## 2024-05-28 NOTE — TELEPHONE ENCOUNTER
----- Message from Ondina Jaramillo sent at 5/28/2024  8:28 AM CDT -----  Regarding: call back request  Name of caller: mikhail       What is the requesting detail: pt requesting a call back regarding her xray results. Please advise       Can the clinic reply by [facility]:       What number to call back:[phone]

## 2024-05-28 NOTE — TELEPHONE ENCOUNTER
Please let pt know he has a 4 mm nonobstructing right renal stone and multiple renal cysts. Thank you!

## 2024-05-29 ENCOUNTER — OFFICE VISIT (OUTPATIENT)
Dept: INTERNAL MEDICINE | Facility: CLINIC | Age: 69
End: 2024-05-29
Payer: COMMERCIAL

## 2024-05-29 DIAGNOSIS — N28.1 RENAL CYST: Primary | ICD-10-CM

## 2024-05-29 PROCEDURE — 1159F MED LIST DOCD IN RCRD: CPT | Mod: CPTII,93,, | Performed by: STUDENT IN AN ORGANIZED HEALTH CARE EDUCATION/TRAINING PROGRAM

## 2024-05-29 PROCEDURE — 3288F FALL RISK ASSESSMENT DOCD: CPT | Mod: CPTII,93,, | Performed by: STUDENT IN AN ORGANIZED HEALTH CARE EDUCATION/TRAINING PROGRAM

## 2024-05-29 PROCEDURE — 4010F ACE/ARB THERAPY RXD/TAKEN: CPT | Mod: CPTII,93,, | Performed by: STUDENT IN AN ORGANIZED HEALTH CARE EDUCATION/TRAINING PROGRAM

## 2024-05-29 PROCEDURE — 99442 PR PHYSICIAN TELEPHONE EVALUATION 11-20 MIN: CPT | Mod: 93,,, | Performed by: STUDENT IN AN ORGANIZED HEALTH CARE EDUCATION/TRAINING PROGRAM

## 2024-05-29 PROCEDURE — 1101F PT FALLS ASSESS-DOCD LE1/YR: CPT | Mod: CPTII,93,, | Performed by: STUDENT IN AN ORGANIZED HEALTH CARE EDUCATION/TRAINING PROGRAM

## 2024-05-29 NOTE — PROGRESS NOTES
Established Patient - Audio Only Telehealth Visit     The patient location is:  Patient's home  The chief complaint leading to consultation is:  Ultrasound results  Visit type: Virtual visit with audio only (telephone)  Total time spent with patient: 12 minutes       The reason for the audio only service rather than synchronous audio and video virtual visit was related to technical difficulties or patient preference/necessity.     Each patient to whom I provide medical services by telemedicine is:  (1) informed of the relationship between the physician and patient and the respective role of any other health care provider with respect to management of the patient; and (2) notified that they may decline to receive medical services by telemedicine and may withdraw from such care at any time. Patient verbally consented to receive this service via voice-only telephone call.       HPI:  Presents via audio only virtual visit to discuss ultrasound results.       Assessment and plan:  Patient had MRI of the lumbar spine done which showed an incidental finding of renal cysts.  He then had a follow up retroperitoneal ultrasound showing multiple simple to minimally complex bilateral renal cysts.  Upon reviewing prior imaging he had similar appearing cysts on CT done in 2013.  Patient was wondering if there is any follow up needed for the cysts.  I discussed with the patient and provided reassurance that cysts are very common and frequently benign; however, given he did have some septations this would be best characterized by contrast enhanced CT classify the cysts according to Bosniak classification to ensure they have no malignant features.  He was open to doing this if needed.  I will discuss this with the patient's PCP and we can order follow up CT with contrast if this is felt to be needed.  He expressed understanding. He has normal renal function.       This service was not originating from a related E/M service provided  within the previous 7 days nor will  to an E/M service or procedure within the next 24 hours or my soonest available appointment.  Prevailing standard of care was able to be met in this audio-only visit.

## 2024-05-31 DIAGNOSIS — I10 ESSENTIAL HYPERTENSION: ICD-10-CM

## 2024-05-31 RX ORDER — LOSARTAN POTASSIUM 50 MG/1
TABLET ORAL
Qty: 90 TABLET | Refills: 3 | Status: SHIPPED | OUTPATIENT
Start: 2024-05-31

## 2024-06-10 NOTE — PROGRESS NOTES
Please let pt know that I received Dr. Go's message about the kidney ultrasound that returned when I was out of the office. I recommend nonurgently f/u with urology to review the imaging about the kidney cysts and to determine if further follow up is indicated.   There was  an incidental finding of a kidney stone. It is recommended to stay well hydrated to help flush this out     Referral to urology signed

## 2024-06-11 ENCOUNTER — TELEPHONE (OUTPATIENT)
Dept: INTERNAL MEDICINE | Facility: CLINIC | Age: 69
End: 2024-06-11
Payer: COMMERCIAL

## 2024-06-11 NOTE — TELEPHONE ENCOUNTER
Spoke with patient. Dr. Beth's orders read back verbatim. Patient expressed understanding and gratitude. Call ended.      Copy of results & Dr. Beth's orders mailed to home address today, 06/11/24.      smiling/interactive/comfortable appearance comfortable appearance/smiling/interactive/resting/sleeping

## 2024-06-11 NOTE — TELEPHONE ENCOUNTER
----- Message from Carolyne Beth MD sent at 6/10/2024  6:01 PM CDT -----  Please let pt know that I received Dr. Go's message about the kidney ultrasound that returned when I was out of the office. I recommend nonurgently f/u with urology to review the imaging about the kidney cysts and to determine if further follow up is indicated.   There was  an incidental finding of a kidney stone. It is recommended to stay well hydrated to help flush this out     Referral to urology signed

## 2024-06-19 ENCOUNTER — CLINICAL SUPPORT (OUTPATIENT)
Dept: REHABILITATION | Facility: OTHER | Age: 69
End: 2024-06-19
Payer: COMMERCIAL

## 2024-06-19 DIAGNOSIS — G89.29 CHRONIC LEFT-SIDED LOW BACK PAIN WITHOUT SCIATICA: Primary | ICD-10-CM

## 2024-06-19 DIAGNOSIS — G89.29 CHRONIC LOW BACK PAIN WITH SCIATICA, SCIATICA LATERALITY UNSPECIFIED, UNSPECIFIED BACK PAIN LATERALITY: ICD-10-CM

## 2024-06-19 DIAGNOSIS — M54.50 CHRONIC LEFT-SIDED LOW BACK PAIN WITHOUT SCIATICA: Primary | ICD-10-CM

## 2024-06-19 DIAGNOSIS — M54.40 CHRONIC LOW BACK PAIN WITH SCIATICA, SCIATICA LATERALITY UNSPECIFIED, UNSPECIFIED BACK PAIN LATERALITY: ICD-10-CM

## 2024-06-19 PROCEDURE — 97161 PT EVAL LOW COMPLEX 20 MIN: CPT

## 2024-06-19 PROCEDURE — 97530 THERAPEUTIC ACTIVITIES: CPT

## 2024-06-19 PROCEDURE — 97140 MANUAL THERAPY 1/> REGIONS: CPT
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
## 2024-06-25 ENCOUNTER — TELEPHONE (OUTPATIENT)
Dept: UROLOGY | Facility: CLINIC | Age: 69
End: 2024-06-25

## 2024-06-25 ENCOUNTER — OFFICE VISIT (OUTPATIENT)
Dept: UROLOGY | Facility: CLINIC | Age: 69
End: 2024-06-25
Attending: INTERNAL MEDICINE
Payer: COMMERCIAL

## 2024-06-25 VITALS
HEART RATE: 83 BPM | DIASTOLIC BLOOD PRESSURE: 73 MMHG | BODY MASS INDEX: 25.9 KG/M2 | RESPIRATION RATE: 18 BRPM | HEIGHT: 68 IN | WEIGHT: 170.88 LBS | SYSTOLIC BLOOD PRESSURE: 121 MMHG

## 2024-06-25 DIAGNOSIS — N20.0 NEPHROLITHIASIS: Primary | ICD-10-CM

## 2024-06-25 DIAGNOSIS — N28.1 COMPLEX RENAL CYST: ICD-10-CM

## 2024-06-25 PROCEDURE — 1126F AMNT PAIN NOTED NONE PRSNT: CPT | Mod: CPTII,S$GLB,, | Performed by: NURSE PRACTITIONER

## 2024-06-25 PROCEDURE — 99203 OFFICE O/P NEW LOW 30 MIN: CPT | Mod: S$GLB,,, | Performed by: NURSE PRACTITIONER

## 2024-06-25 PROCEDURE — 3074F SYST BP LT 130 MM HG: CPT | Mod: CPTII,S$GLB,, | Performed by: NURSE PRACTITIONER

## 2024-06-25 PROCEDURE — 1160F RVW MEDS BY RX/DR IN RCRD: CPT | Mod: CPTII,S$GLB,, | Performed by: NURSE PRACTITIONER

## 2024-06-25 PROCEDURE — 4010F ACE/ARB THERAPY RXD/TAKEN: CPT | Mod: CPTII,S$GLB,, | Performed by: NURSE PRACTITIONER

## 2024-06-25 PROCEDURE — 1159F MED LIST DOCD IN RCRD: CPT | Mod: CPTII,S$GLB,, | Performed by: NURSE PRACTITIONER

## 2024-06-25 PROCEDURE — 3078F DIAST BP <80 MM HG: CPT | Mod: CPTII,S$GLB,, | Performed by: NURSE PRACTITIONER

## 2024-06-25 PROCEDURE — 3008F BODY MASS INDEX DOCD: CPT | Mod: CPTII,S$GLB,, | Performed by: NURSE PRACTITIONER

## 2024-06-25 NOTE — TELEPHONE ENCOUNTER
Scheduled appt   ----- Message from Lindsay Renee NP sent at 6/25/2024  2:51 PM CDT -----  Follow up with me in 1 year with BUSHRA and DEBBIE in a year. Thanks

## 2024-06-25 NOTE — PROGRESS NOTES
Subjective:      Cuate Fair is a 69 y.o. male who was referred by Dr. Beth for evaluation of his renal cyst.      The patient recently completed a BUSHRA due to flank pain. Presents today to discuss findings.     Denies bothersome urinary symptoms. Denies gross hematuria.    Denies a history of recurrent UTIs and nephrolithiasis.    The following portions of the patient's history were reviewed and updated as appropriate: allergies, current medications, past family history, past medical history, past social history, past surgical history and problem list.    Review of Systems  Constitutional: no fever or chills  ENT: no nasal congestion or sore throat  Respiratory: no cough or shortness of breath  Cardiovascular: no chest pain or palpitations  Gastrointestinal: no nausea or vomiting, tolerating diet  Genitourinary: as per HPI  Hematologic/Lymphatic: no easy bruising or lymphadenopathy  Musculoskeletal: no arthralgias or myalgias  Neurological: no seizures or tremors  Behavioral/Psych: no auditory or visual hallucinations     Objective:   Vitals:   Vitals:    06/25/24 1409   BP: 121/73   Pulse: 83   Resp: 18     Physical Exam   General: alert and oriented, no acute distress  Head: normocephalic, atraumatic  Neck: supple, normal ROM  Respiratory: Symmetric expansion, non-labored breathing  Cardiovascular: regular rate and rhythm  Abdomen: soft, non tender, non distended  Genitourinary: deferred  Skin: normal coloration and turgor, no rashes, no suspicious skin lesions noted  Neuro: alert and oriented x3, no gross deficits  Psych: normal judgment and insight, normal mood/affect, and non-anxious    Lab Review   Urinalysis demonstrates : no specimen    Lab Results   Component Value Date    WBC 4.07 01/23/2024    HGB 16.4 01/23/2024    HCT 49.3 01/23/2024    MCV 89 01/23/2024     01/23/2024     Lab Results   Component Value Date    CREATININE 1.2 01/23/2024    BUN 14 01/23/2024     Lab Results   Component  "Value Date    PSA 2.5 01/23/2024     Imaging   BUSHRA- "Right kidney: The right kidney measures 10.4 cm. No cortical thinning. No loss of corticomedullary distinction. Resistive index measures 0.60.  4 mm echogenic focus, favoring a nonobstructing renal stone.  Multiple simple to minimally complex renal cysts, the largest measuring 4.0 x 4.2 x 4.0 cm with a thin incomplete septation.     Left kidney: The left kidney measures 10.2 cm. No cortical thinning. No loss of corticomedullary distinction. Resistive index measures 0.60.  No mass. No renal stone. No hydronephrosis.  Minimally complex renal cyst measuring 3.1 x 3.3 x 3.0 cm with a thin incomplete septation.     The bladder is partially distended at the time of scanning and has an unremarkable appearance.     Impression:     4 mm nonobstructing right renal stone.  No hydronephrosis.     Multiple simple to minimally complex bilateral renal cysts."    Assessment:     1. Nephrolithiasis    2. Complex renal cyst      Plan:   Diagnoses and all orders for this visit:    Nephrolithiasis    Complex renal cyst  -     Ambulatory referral/consult to Urology  -     US Retroperitoneal Complete; Future  -     X-Ray KUB; Future    Plan:  --Discussed imaging findings- cysts noted on CT in 2013  --Possible stone on US- recommend general stone preventive measures, to include increased hydration, low Na diet, and increased citrus intake  --Follow up in 1 year with KUB and BUSHRA       "

## 2024-08-21 DIAGNOSIS — E78.5 HYPERLIPIDEMIA, UNSPECIFIED HYPERLIPIDEMIA TYPE: ICD-10-CM

## 2024-08-21 RX ORDER — ATORVASTATIN CALCIUM 40 MG/1
40 TABLET, FILM COATED ORAL
Qty: 90 TABLET | Refills: 3 | Status: SHIPPED | OUTPATIENT
Start: 2024-08-21

## 2024-08-23 ENCOUNTER — OFFICE VISIT (OUTPATIENT)
Dept: URGENT CARE | Facility: CLINIC | Age: 69
End: 2024-08-23
Payer: COMMERCIAL

## 2024-08-23 ENCOUNTER — TELEPHONE (OUTPATIENT)
Dept: OTOLARYNGOLOGY | Facility: CLINIC | Age: 69
End: 2024-08-23
Payer: COMMERCIAL

## 2024-08-23 VITALS
HEART RATE: 65 BPM | OXYGEN SATURATION: 98 % | HEIGHT: 68 IN | BODY MASS INDEX: 25.01 KG/M2 | WEIGHT: 165 LBS | RESPIRATION RATE: 20 BRPM | DIASTOLIC BLOOD PRESSURE: 89 MMHG | SYSTOLIC BLOOD PRESSURE: 135 MMHG | TEMPERATURE: 98 F

## 2024-08-23 DIAGNOSIS — R42 DIZZINESS: Primary | ICD-10-CM

## 2024-08-23 LAB
ALBUMIN SERPL BCP-MCNC: 4.3 G/DL (ref 3.5–5.2)
ALP SERPL-CCNC: 69 U/L (ref 55–135)
ALT SERPL W/O P-5'-P-CCNC: 21 U/L (ref 10–44)
ANION GAP SERPL CALC-SCNC: 7 MMOL/L (ref 8–16)
AST SERPL-CCNC: 21 U/L (ref 10–40)
BASOPHILS # BLD AUTO: 0.02 K/UL (ref 0–0.2)
BASOPHILS NFR BLD: 0.7 % (ref 0–1.9)
BILIRUB SERPL-MCNC: 0.9 MG/DL (ref 0.1–1)
BILIRUBIN, UA POC OHS: NEGATIVE
BLOOD, UA POC OHS: NEGATIVE
BUN SERPL-MCNC: 13 MG/DL (ref 8–23)
CALCIUM SERPL-MCNC: 9.7 MG/DL (ref 8.7–10.5)
CHLORIDE SERPL-SCNC: 104 MMOL/L (ref 95–110)
CK SERPL-CCNC: 315 U/L (ref 20–200)
CLARITY, UA POC OHS: CLEAR
CO2 SERPL-SCNC: 27 MMOL/L (ref 23–29)
COLOR, UA POC OHS: YELLOW
CREAT SERPL-MCNC: 1.2 MG/DL (ref 0.5–1.4)
CTP QC/QA: YES
DIFFERENTIAL METHOD BLD: ABNORMAL
EOSINOPHIL # BLD AUTO: 0.1 K/UL (ref 0–0.5)
EOSINOPHIL NFR BLD: 3.4 % (ref 0–8)
ERYTHROCYTE [DISTWIDTH] IN BLOOD BY AUTOMATED COUNT: 12.8 % (ref 11.5–14.5)
EST. GFR  (NO RACE VARIABLE): >60 ML/MIN/1.73 M^2
GLUCOSE SERPL-MCNC: 108 MG/DL (ref 70–110)
GLUCOSE SERPL-MCNC: 108 MG/DL (ref 70–110)
GLUCOSE, UA POC OHS: NEGATIVE
HCT VFR BLD AUTO: 50.1 % (ref 40–54)
HGB BLD-MCNC: 17.1 G/DL (ref 14–18)
IMM GRANULOCYTES # BLD AUTO: 0 K/UL (ref 0–0.04)
IMM GRANULOCYTES NFR BLD AUTO: 0 % (ref 0–0.5)
KETONES, UA POC OHS: NEGATIVE
LEUKOCYTES, UA POC OHS: NEGATIVE
LYMPHOCYTES # BLD AUTO: 1 K/UL (ref 1–4.8)
LYMPHOCYTES NFR BLD: 35.4 % (ref 18–48)
MCH RBC QN AUTO: 30.5 PG (ref 27–31)
MCHC RBC AUTO-ENTMCNC: 34.1 G/DL (ref 32–36)
MCV RBC AUTO: 90 FL (ref 82–98)
MONOCYTES # BLD AUTO: 0.3 K/UL (ref 0.3–1)
MONOCYTES NFR BLD: 9.9 % (ref 4–15)
NEUTROPHILS # BLD AUTO: 1.5 K/UL (ref 1.8–7.7)
NEUTROPHILS NFR BLD: 50.6 % (ref 38–73)
NITRITE, UA POC OHS: NEGATIVE
NRBC BLD-RTO: 0 /100 WBC
OHS QRS DURATION: 102 MS
OHS QTC CALCULATION: 414 MS
PH, UA POC OHS: 7
PLATELET # BLD AUTO: 155 K/UL (ref 150–450)
PMV BLD AUTO: 11.1 FL (ref 9.2–12.9)
POTASSIUM SERPL-SCNC: 4.5 MMOL/L (ref 3.5–5.1)
PROT SERPL-MCNC: 7.4 G/DL (ref 6–8.4)
PROTEIN, UA POC OHS: NEGATIVE
RBC # BLD AUTO: 5.6 M/UL (ref 4.6–6.2)
SARS-COV-2 RDRP RESP QL NAA+PROBE: NEGATIVE
SODIUM SERPL-SCNC: 138 MMOL/L (ref 136–145)
SPECIFIC GRAVITY, UA POC OHS: 1.02
UROBILINOGEN, UA POC OHS: 0.2
WBC # BLD AUTO: 2.94 K/UL (ref 3.9–12.7)

## 2024-08-23 PROCEDURE — 80053 COMPREHEN METABOLIC PANEL: CPT | Performed by: FAMILY MEDICINE

## 2024-08-23 PROCEDURE — 93010 ELECTROCARDIOGRAM REPORT: CPT | Mod: S$GLB,,, | Performed by: INTERNAL MEDICINE

## 2024-08-23 PROCEDURE — 82550 ASSAY OF CK (CPK): CPT | Performed by: FAMILY MEDICINE

## 2024-08-23 PROCEDURE — 93005 ELECTROCARDIOGRAM TRACING: CPT | Mod: S$GLB,,, | Performed by: FAMILY MEDICINE

## 2024-08-23 PROCEDURE — 85025 COMPLETE CBC W/AUTO DIFF WBC: CPT | Performed by: FAMILY MEDICINE

## 2024-08-23 PROCEDURE — 36415 COLL VENOUS BLD VENIPUNCTURE: CPT | Performed by: FAMILY MEDICINE

## 2024-08-23 RX ORDER — MECLIZINE HYDROCHLORIDE 25 MG/1
25 TABLET ORAL 3 TIMES DAILY PRN
Qty: 21 TABLET | Refills: 1 | Status: SHIPPED | OUTPATIENT
Start: 2024-08-23 | End: 2024-08-30

## 2024-08-23 RX ORDER — MECLIZINE HYDROCHLORIDE 25 MG/1
25 TABLET ORAL
Status: COMPLETED | OUTPATIENT
Start: 2024-08-23 | End: 2024-08-23

## 2024-08-23 RX ADMIN — MECLIZINE HYDROCHLORIDE 25 MG: 25 TABLET ORAL at 08:08

## 2024-08-23 NOTE — PROGRESS NOTES
"Subjective:      Patient ID: Cuate Fair is a 69 y.o. male.    Vitals:  height is 5' 8" (1.727 m) and weight is 74.8 kg (165 lb). His oral temperature is 98.1 °F (36.7 °C). His blood pressure is 135/89 and his pulse is 65. His respiration is 20 and oxygen saturation is 98%.     Chief Complaint: Dizziness    Pt presetns with 'dizziness; starting this AM when he woke up. Worse at that time and occurring whenTurning head - feels like 'vision can't keep up'  Denies any double vision, fever, chills, HA, change in hearing, cp, sob.   No URI symptoms.   Had similar episode in past, was seen in [facility] and dx with hypoglycemia.   Of note pt states he had been workinghard this week, both inside and outside and concerned he Is dehydrated from the work. He reports no urinary symptoms. No muscle aches      ROS   See HPI for pertinent positives and negatives    Objective:     Physical Exam  Constitutional: Pt oriented to person, place, and time.  Non-toxic appearance.   Patient does not appear ill. No distress. normal  HENT: No icterus or facial swelling appreciated  Head: Normocephalic and atraumatic.   Nose: No congestion.   Ears:  Left: TM without erythema, bulging or retraction. EAC without drainage or debris/cerumen impaction or swelling, external ear structures normal  Right: TM without erythema, bulging or retraction. EAC without drainage or debris/cerumen impaction or swelling, external ear structures normal  Oropharynx: pharynx/tonsils without erythema or exudates. No uvular shift or soft palate swelling. No stridor    Pulmonary/Chest: Effort normal. No stridor. No respiratory distress.  CTA b/l  Abdominal: Normal appearance. Abdomen exhibits no distension.   Musculoskeletal:         General: No swelling.   Neurological:  5/5  strength and b/l UE/LE b/l, gait normal, mainor halpike with + symptoms with rotation head b/l but no nystagmus Patient is alert and oriented to person, place, and time.   Skin: Skin is not " diaphoretic and not pale. no jaundice  Psychiatric: Patients behavior is normal. Mood, judgment and thought content normal.     Results for orders placed or performed in visit on 08/23/24   POCT Glucose, Hand-Held Device   Result Value Ref Range    POC Glucose 108 70 - 110 MG/DL   POCT Urinalysis(Instrument)   Result Value Ref Range    Color, POC UA Yellow Yellow, Straw, Colorless    Clarity, POC UA Clear Clear    Glucose, POC UA Negative Negative    Bilirubin, POC UA Negative Negative    Ketones, POC UA Negative Negative    Spec Grav POC UA 1.020 1.005 - 1.030    Blood, POC UA Negative Negative    pH, POC UA 7.0 5.0 - 8.0    Protein, POC UA Negative Negative    Urobilinogen, POC UA 0.2 <=1.0    Nitrite, POC UA Negative Negative    WBC, POC UA Negative Negative   POCT COVID-19 Rapid Screening   Result Value Ref Range    POC Rapid COVID Negative Negative     Acceptable Yes    IN OFFICE EKG 12-LEAD (to Muse)   Result Value Ref Range    QRS Duration 102 ms    [facility] QTC Calculation 414 ms       Assessment:     1. Dizziness        Plan:       Dizziness  -     IN OFFICE EKG 12-LEAD (to Muse)- NSR  -     Orthostatic vital signs- no significant changes in BP or HR  -     POCT Glucose, Hand-Held Device- nml  -     POCT Urinalysis(Instrument)- no significant findings  -     POCT COVID-19 Rapid Screening- neg  -     meclizine tablet 25 mg  -     CBC W/ AUTO DIFFERENTIAL  -     COMPREHENSIVE METABOLIC PANEL  -     CK  -     Ambulatory referral/consult to ENT  -     meclizine (ANTIVERT) 25 mg tablet; Take 1 tablet (25 mg total) by mouth 3 (three) times daily as needed for Dizziness or Nausea.  Dispense: 21 tablet; Refill: 1      Patient Instructions   Rest and hydrate!    Can take meclizine as needed for intermittent dizziness symptoms    Dizziness can have many different causes, some benign, some potentially life threatening- -and not all can be diagnosed her in the urgent care.     Therefor, If you have any  symptoms that can indicate a more worrisome cause due to heart or brain issue for example, such as chest pain, shortness of breath, constant dizziness symptoms (not coming and going), or any focal numbness or weakness or slurring of speech or balance issues (falling over), etc, I do advise for you to to to the ER immediately for stat labs and imaging of the brain.     Otherwise if your symptoms are mild and intermittent, you can follow up with your PCP or even an ENT. You can call our  line at [phone] and they can assist you in making this specialist appointment since a referral has been placed

## 2024-08-23 NOTE — PATIENT INSTRUCTIONS
Rest and hydrate!    Can take meclizine as needed for intermittent dizziness symptoms    Dizziness can have many different causes, some benign, some potentially life threatening- -and not all can be diagnosed her in the urgent care.     Therefor, If you have any symptoms that can indicate a more worrisome cause due to heart or brain issue for example, such as chest pain, shortness of breath, constant dizziness symptoms (not coming and going), or any focal numbness or weakness or slurring of speech or balance issues (falling over), etc, I do advise for you to to to the ER immediately for stat labs and imaging of the brain.     Otherwise if your symptoms are mild and intermittent, you can follow up with your PCP or even an ENT. You can call our  line at [phone] and they can assist you in making this specialist appointment since a referral has been placed

## 2024-08-23 NOTE — LETTER
August 27, 2024      [facility] [address] [address]  Phone: [phone]  Fax: [phone]       Patient: Cuate Fair   YOB: 1955  Date of Visit: 08/27/2024    To Whom It May Concern:    Anastasiia Fair  was at [facility] on 8/23/2024. The patient may return to work/school on 8/25/2024 with no restrictions. If you have any questions or concerns, or if I can be of further assistance, please do not hesitate to contact me.    Sincerely,    DO Jahaira Saldana, NP

## 2024-08-23 NOTE — TELEPHONE ENCOUNTER
Called patient to schedule jeovany there was no answer I left patient a VM to call back and schedule

## 2024-09-10 ENCOUNTER — OFFICE VISIT (OUTPATIENT)
Dept: INTERNAL MEDICINE | Facility: CLINIC | Age: 69
End: 2024-09-10
Attending: INTERNAL MEDICINE
Payer: COMMERCIAL

## 2024-09-10 VITALS
SYSTOLIC BLOOD PRESSURE: 129 MMHG | BODY MASS INDEX: 25.66 KG/M2 | DIASTOLIC BLOOD PRESSURE: 78 MMHG | HEIGHT: 68 IN | WEIGHT: 169.31 LBS | HEART RATE: 82 BPM | OXYGEN SATURATION: 96 %

## 2024-09-10 DIAGNOSIS — R42 DIZZINESS AND GIDDINESS: ICD-10-CM

## 2024-09-10 DIAGNOSIS — I10 ESSENTIAL HYPERTENSION: ICD-10-CM

## 2024-09-10 DIAGNOSIS — R74.8 ELEVATED CPK: ICD-10-CM

## 2024-09-10 DIAGNOSIS — D70.8 OTHER NEUTROPENIA: ICD-10-CM

## 2024-09-10 DIAGNOSIS — R42 DIZZINESS: ICD-10-CM

## 2024-09-10 DIAGNOSIS — F41.8 SITUATIONAL ANXIETY: Primary | ICD-10-CM

## 2024-09-10 PROCEDURE — 1126F AMNT PAIN NOTED NONE PRSNT: CPT | Mod: CPTII,S$GLB,, | Performed by: INTERNAL MEDICINE

## 2024-09-10 PROCEDURE — 3008F BODY MASS INDEX DOCD: CPT | Mod: CPTII,S$GLB,, | Performed by: INTERNAL MEDICINE

## 2024-09-10 PROCEDURE — 1159F MED LIST DOCD IN RCRD: CPT | Mod: CPTII,S$GLB,, | Performed by: INTERNAL MEDICINE

## 2024-09-10 PROCEDURE — 3288F FALL RISK ASSESSMENT DOCD: CPT | Mod: CPTII,S$GLB,, | Performed by: INTERNAL MEDICINE

## 2024-09-10 PROCEDURE — 3078F DIAST BP <80 MM HG: CPT | Mod: CPTII,S$GLB,, | Performed by: INTERNAL MEDICINE

## 2024-09-10 PROCEDURE — 3074F SYST BP LT 130 MM HG: CPT | Mod: CPTII,S$GLB,, | Performed by: INTERNAL MEDICINE

## 2024-09-10 PROCEDURE — 99999 PR PBB SHADOW E&M-EST. PATIENT-LVL V: CPT | Mod: PBBFAC,,, | Performed by: INTERNAL MEDICINE

## 2024-09-10 PROCEDURE — 1101F PT FALLS ASSESS-DOCD LE1/YR: CPT | Mod: CPTII,S$GLB,, | Performed by: INTERNAL MEDICINE

## 2024-09-10 PROCEDURE — 1160F RVW MEDS BY RX/DR IN RCRD: CPT | Mod: CPTII,S$GLB,, | Performed by: INTERNAL MEDICINE

## 2024-09-10 PROCEDURE — 4010F ACE/ARB THERAPY RXD/TAKEN: CPT | Mod: CPTII,S$GLB,, | Performed by: INTERNAL MEDICINE

## 2024-09-10 PROCEDURE — 99214 OFFICE O/P EST MOD 30 MIN: CPT | Mod: S$GLB,,, | Performed by: INTERNAL MEDICINE

## 2024-09-10 NOTE — PROGRESS NOTES
"Subjective:   Patient ID: Cuate Fair is a 69 y.o. male  Chief complaint:   Chief Complaint   Patient presents with    Dizziness     F/u ER visit       HPI  Here for  f/u appt for    Reviewed labs collected at that time     Cbc results stable - leukopenia:   Previously:   Thrombocytopenia and neutropenia prior to this dx - spep and immunifix wnl  immunoglob wnl:  Labs stable   - seen by h/o 12/2019 - no indication for bm bx   - psa wnl  - no easy bruising or bleeding, hematuria or GIB  - No diarrhea, sore throat, fevers, joint swelling or pain or inc warmth or redness     B12 def: 984 <- 256   Taking suppl and improved     Htn:   work load is edie rocketing per him and inc stress   No days off   Works 12 hour shifts and then in [employer] Sundays   Losartan 50 and amlodipine   C/w meds   Not resting prior to checking bp   taking bp first in am repeat bp at goal today and reviewed correct bp response   Home cuff has not been checked     When dizziness had wrist cuff was 200/100  Home readings 140-150s/90s persistently  but not checking with rest   Cooking own food    Dizziness:   Ahs not f/u with ent ts wanted to discuss first a this appt   Given meclizine and helpful on day he took it  Occ sx when standing quickly at times   Vision blurry   No double vision or loss of vision   No slurred speech or facial weakness  No headaches  Hit head a couple of times when working and stepping up to complete tasks as many doors are shorter than normal or hitting pipes    HLD:  Abiodun lipitor     Review of Systems    Objective:  Vitals:    09/10/24 1404 09/10/24 1505   BP: (!) 142/76 129/78   BP Location: Left arm    Patient Position: Sitting    Pulse: 82    SpO2: 96%    Weight: 76.8 kg (169 lb 5 oz)    Height: 5' 8" (1.727 m)      Body mass index is 25.74 kg/m².    Physical Exam  Vitals reviewed.   Constitutional:       Appearance: Normal appearance. He is well-developed.   HENT:      Head: Normocephalic and atraumatic.     "  Right Ear: Tympanic membrane, ear canal and external ear normal.      Left Ear: Tympanic membrane, ear canal and external ear normal.      Nose: Nose normal. No congestion.      Mouth/Throat:      Mouth: Mucous membranes are moist.      Pharynx: Oropharynx is clear.   Eyes:      Conjunctiva/sclera: Conjunctivae normal.   Neck:      Thyroid: No thyromegaly.   Cardiovascular:      Rate and Rhythm: Normal rate and regular rhythm.      Pulses: Normal pulses.      Heart sounds: Normal heart sounds.   Pulmonary:      Effort: Pulmonary effort is normal.      Breath sounds: Normal breath sounds.   Abdominal:      General: Bowel sounds are normal.      Palpations: Abdomen is soft.   Musculoskeletal:         General: No swelling or tenderness.      Cervical back: Neck supple.   Lymphadenopathy:      Cervical: No cervical adenopathy.   Skin:     General: Skin is warm and dry.      Capillary Refill: Capillary refill takes less than 2 seconds.   Neurological:      General: No focal deficit present.      Mental Status: He is alert and oriented to person, place, and time.   Psychiatric:         Mood and Affect: Mood normal.         Behavior: Behavior normal.         Thought Content: Thought content normal.         Judgment: Judgment normal.       Assessment:  1. Situational anxiety    2. Other neutropenia    3. Dizziness    4. Dizziness and giddiness    5. Elevated CPK    6. Essential hypertension        Plan:  1. Situational anxiety  Stable   Will revisit role fo controller med if does not improve over time     2. Other neutropenia  -     CBC Auto Differential; Future; Expected date: 09/24/2024  Stable   Monitor cbc     3. Dizziness  -     US Carotid Bilateral; Future; Expected date: 09/10/2024  Check studies   F/u with ent   Stay hydrated   Meclizine prn     4. Dizziness and giddiness  -     CT Head Without Contrast; Future; Expected date: 09/10/2024    5. Elevated CPK  -     CK; Future; Expected date: 09/24/2024    6.  Essential hypertension  Stable   Cont meds and home monitoring  Bp nv check in 2 weeks for bp check     Ent follow up     Health Maintenance   Topic Date Due    PROSTATE-SPECIFIC ANTIGEN  01/23/2025    Lipid Panel  01/23/2025    TETANUS VACCINE  11/09/2028    Colorectal Cancer Screening  06/07/2031    Hepatitis C Screening  Completed    Shingles Vaccine  Completed

## 2024-09-24 ENCOUNTER — CLINICAL SUPPORT (OUTPATIENT)
Dept: INTERNAL MEDICINE | Facility: CLINIC | Age: 69
End: 2024-09-24
Payer: COMMERCIAL

## 2024-09-24 ENCOUNTER — LAB VISIT (OUTPATIENT)
Dept: LAB | Facility: OTHER | Age: 69
End: 2024-09-24
Attending: INTERNAL MEDICINE
Payer: COMMERCIAL

## 2024-09-24 ENCOUNTER — TELEPHONE (OUTPATIENT)
Dept: INTERNAL MEDICINE | Facility: CLINIC | Age: 69
End: 2024-09-24
Payer: COMMERCIAL

## 2024-09-24 VITALS
DIASTOLIC BLOOD PRESSURE: 76 MMHG | OXYGEN SATURATION: 99 % | HEIGHT: 68 IN | BODY MASS INDEX: 25.66 KG/M2 | SYSTOLIC BLOOD PRESSURE: 131 MMHG | WEIGHT: 169.31 LBS | HEART RATE: 60 BPM

## 2024-09-24 DIAGNOSIS — D70.8 OTHER NEUTROPENIA: ICD-10-CM

## 2024-09-24 DIAGNOSIS — I10 ESSENTIAL HYPERTENSION: Primary | ICD-10-CM

## 2024-09-24 DIAGNOSIS — R74.8 ELEVATED CPK: ICD-10-CM

## 2024-09-24 LAB
BASOPHILS # BLD AUTO: 0.02 K/UL (ref 0–0.2)
BASOPHILS NFR BLD: 0.5 % (ref 0–1.9)
CK SERPL-CCNC: 433 U/L (ref 20–200)
DIFFERENTIAL METHOD BLD: ABNORMAL
EOSINOPHIL # BLD AUTO: 0.3 K/UL (ref 0–0.5)
EOSINOPHIL NFR BLD: 7.3 % (ref 0–8)
ERYTHROCYTE [DISTWIDTH] IN BLOOD BY AUTOMATED COUNT: 12.7 % (ref 11.5–14.5)
HCT VFR BLD AUTO: 49.4 % (ref 40–54)
HGB BLD-MCNC: 16.6 G/DL (ref 14–18)
IMM GRANULOCYTES # BLD AUTO: 0 K/UL (ref 0–0.04)
IMM GRANULOCYTES NFR BLD AUTO: 0 % (ref 0–0.5)
LYMPHOCYTES # BLD AUTO: 1.7 K/UL (ref 1–4.8)
LYMPHOCYTES NFR BLD: 45 % (ref 18–48)
MCH RBC QN AUTO: 30 PG (ref 27–31)
MCHC RBC AUTO-ENTMCNC: 33.6 G/DL (ref 32–36)
MCV RBC AUTO: 89 FL (ref 82–98)
MONOCYTES # BLD AUTO: 0.4 K/UL (ref 0.3–1)
MONOCYTES NFR BLD: 9.7 % (ref 4–15)
NEUTROPHILS # BLD AUTO: 1.4 K/UL (ref 1.8–7.7)
NEUTROPHILS NFR BLD: 37.5 % (ref 38–73)
NRBC BLD-RTO: 0 /100 WBC
PLATELET # BLD AUTO: 150 K/UL (ref 150–450)
PMV BLD AUTO: 11.1 FL (ref 9.2–12.9)
RBC # BLD AUTO: 5.53 M/UL (ref 4.6–6.2)
WBC # BLD AUTO: 3.82 K/UL (ref 3.9–12.7)

## 2024-09-24 PROCEDURE — 99999 PR PBB SHADOW E&M-EST. PATIENT-LVL IV: CPT | Mod: PBBFAC,,,

## 2024-09-24 PROCEDURE — 82550 ASSAY OF CK (CPK): CPT | Performed by: INTERNAL MEDICINE

## 2024-09-24 PROCEDURE — 85025 COMPLETE CBC W/AUTO DIFF WBC: CPT | Performed by: INTERNAL MEDICINE

## 2024-09-24 PROCEDURE — 36415 COLL VENOUS BLD VENIPUNCTURE: CPT | Performed by: INTERNAL MEDICINE

## 2024-09-24 NOTE — TELEPHONE ENCOUNTER
Agree with plan - will cont current bp mgmt and obtain new home bp cuff for further monitoring  Discussed stress and how it affects mood - he is considering [facility] in near future and opts to hold off on rx to address this - will cont to monitor mood for now and agree with counseling

## 2024-09-24 NOTE — TELEPHONE ENCOUNTER
"IN-PERSON NURSE VISIT BP CHECK TODAY:    GIVEN BP LOG FOR TRACKING AT HOME  BP MONITOR IS A WRIST CUFF WHICH WORKS BUT READS TOO HIGH. ADVISED PATIENT TO FIND MACHINE WITH A LARGER CUFF & ONE THAT GOES AROUND BICEP VS. WRIST.  MENTIONS WORKING 6 DAYS WEEKLY AND WHILE NOT "DEPRESSED" HE FEELS STRESSED. GIVEN COPY OF PSYCH RESOURCES AT [facility] & SHOWED HIM HOW TO FIND HIS EAP BENEFITS/MH & WELLNESS BENEFITS AT [facility].      Cuate Fair 69 y.o. male is here for Blood Pressure check. in person     Manual Blood pressure reading was  122/80, Pulse 54. (Checked at the end of the visit)     AUTOMATIC READING PERFORMED FOR COMPARISON: 131/76 HR= 60        Pt's Home blood pressure machine read in office 152/101, Pulse 57.      Diagnosed with Hypertension yes.     Patient took blood pressure medication today yes.    Last dose of blood pressure medication was taken at 0545 TODAY, 09/24/24.   Patient took:  AMLODIPINE 5 mg TABLET  ATORVASTATIN 40 mg TABLET  LOSARTAN 50 mg TABLET      All Medications and OTC medication updated yes     Does patient have record of home blood pressure readings / Blood Pressure Log NO, GIVEN LOG IN VISIT.      Does the pt have any complaints today in regards to their blood pressure medication? no.   Complains of NOTHING.   Patient is asymptomatic.      Were you sitting still for 5-10 minutes prior to taking your Blood pressure? yes      Has your blood pressure monitor ever been checked? yes When was last time we checked your blood pressure monitor? TODAY, 09/24/24.     Updated vitals yes     Follow up date is TBD.      Dr. ARCHER notified.            Creatinine   Date Value Ref Range Status   08/23/2024 1.2 0.5 - 1.4 mg/dL Final            Sodium   Date Value Ref Range Status   08/23/2024 138 136 - 145 mmol/L Final            Potassium   Date Value Ref Range Status   08/23/2024 4.5 3.5 - 5.1 mmol/L Final               "

## 2024-09-24 NOTE — PROGRESS NOTES
Cuate Fair 69 y.o. male is here for Blood Pressure check. in person    Manual Blood pressure reading was  122/80, Pulse 54. (Checked at the end of the visit)    AUTOMATIC READING PERFORMED FOR COMPARISON: 131/76 HR= 60      Pt's Home blood pressure machine read in office 152/101, Pulse 57.     Diagnosed with Hypertension yes.    Patient took blood pressure medication today yes.    Last dose of blood pressure medication was taken at 0545 TODAY, 09/24/24.   Patient took:  AMLODIPINE 5 mg TABLET  ATORVASTATIN 40 mg TABLET  LOSARTAN 50 mg TABLET     All Medications and OTC medication updated yes    Does patient have record of home blood pressure readings / Blood Pressure Log NO, GIVEN LOG IN VISIT.     Does the pt have any complaints today in regards to their blood pressure medication? no.   Complains of NOTHING.   Patient is asymptomatic.     Were you sitting still for 5-10 minutes prior to taking your Blood pressure? yes     Has your blood pressure monitor ever been checked? yes When was last time we checked your blood pressure monitor? TODAY, 09/24/24.    Updated vitals yes    Follow up date is TBD.     Dr. RACHER notified.     Creatinine   Date Value Ref Range Status   08/23/2024 1.2 0.5 - 1.4 mg/dL Final     Sodium   Date Value Ref Range Status   08/23/2024 138 136 - 145 mmol/L Final     Potassium   Date Value Ref Range Status   08/23/2024 4.5 3.5 - 5.1 mmol/L Final

## 2024-10-08 ENCOUNTER — TELEPHONE (OUTPATIENT)
Dept: INTERNAL MEDICINE | Facility: CLINIC | Age: 69
End: 2024-10-08
Payer: COMMERCIAL

## 2024-10-08 NOTE — TELEPHONE ENCOUNTER
"Spoke with patient. Dr. Beth's orders read back. Requested lab results from 09/24/24 be mailed to home address. Patient expressed understanding and gratitude for phone call.  Call ended.        Results letter mailed out today, 10/08/24.     "    Hi, Your cpk level is still slightly elevated - please continue efforts to stay well hydrated moving forward. Blood counts are stable. We will continue to monitor these labs periodically moving forward. Please let me know if you have any questions.  PG    Written by Carolyne Beth MD on 9/30/2024  1:02 PM CDT  "  "

## 2024-10-25 ENCOUNTER — OFFICE VISIT (OUTPATIENT)
Dept: URGENT CARE | Facility: CLINIC | Age: 69
End: 2024-10-25
Payer: COMMERCIAL

## 2024-10-25 VITALS
SYSTOLIC BLOOD PRESSURE: 134 MMHG | DIASTOLIC BLOOD PRESSURE: 82 MMHG | HEIGHT: 68 IN | HEART RATE: 75 BPM | RESPIRATION RATE: 20 BRPM | WEIGHT: 170.94 LBS | TEMPERATURE: 99 F | OXYGEN SATURATION: 100 % | BODY MASS INDEX: 25.91 KG/M2

## 2024-10-25 DIAGNOSIS — L25.5 DERMATITIS DUE TO PLANTS, INCLUDING POISON IVY, SUMAC, AND OAK: Primary | ICD-10-CM

## 2024-10-25 RX ORDER — PREDNISONE 10 MG/1
TABLET ORAL
Qty: 35 TABLET | Refills: 0 | Status: SHIPPED | OUTPATIENT
Start: 2024-10-25 | End: 2024-11-09

## 2024-10-25 RX ORDER — TRIAMCINOLONE ACETONIDE 1 MG/G
CREAM TOPICAL 2 TIMES DAILY
Qty: 15 G | Refills: 1 | Status: SHIPPED | OUTPATIENT
Start: 2024-10-25 | End: 2024-11-04

## 2024-10-25 NOTE — PROGRESS NOTES
"Subjective:      Patient ID: Cuate Fair is a 69 y.o. male.    Vitals:  height is 5' 8" (1.727 m) and weight is 77.6 kg (170 lb 15.5 oz). His oral temperature is 98.6 °F (37 °C). His blood pressure is 134/82 and his pulse is 75. His respiration is 20 and oxygen saturation is 100%.     Chief Complaint: Rash    69-year-old male here for rash and itchiness to his left anterior neck, right lower eyelid, in his right shoulder that started 1 week ago last Thursday.  He stuck his head into some bushes in order to turn on the hose in his yard.  Shortly after, he felt itchiness to his face, neck, shoulders.  Thinks he may have been bit by some kind of insect.  The rash is not painful.  He has not taken anything for the rash.  Denies any other symptoms at this time.    Rash  This is a new problem. The current episode started 1 to 4 weeks ago. The problem has been gradually worsening since onset. The affected locations include the head, face, right shoulder, back, left eye and right ear. The rash is characterized by blistering, redness, swelling and itchiness. He was exposed to an insect bite/sting. Pertinent negatives include no congestion, cough, eye pain, fever, shortness of breath, sore throat or vomiting. Past treatments include analgesics. The treatment provided no relief.       Constitution: Negative for chills and fever.   HENT:  Negative for congestion and sore throat.    Neck: Negative for neck pain and neck stiffness.   Cardiovascular:  Negative for chest pain.   Eyes:  Positive for eye itching and eyelid swelling. Negative for eye discharge, eye pain, eye redness, double vision and blurred vision.   Respiratory:  Negative for cough and shortness of breath.    Gastrointestinal:  Negative for nausea and vomiting.   Skin:  Positive for rash.   Allergic/Immunologic: Positive for itching.   Neurological:  Negative for dizziness and headaches.      Objective:     Physical Exam   Constitutional: He is oriented " to person, place, and time. He appears well-developed.   HENT:   Head: Normocephalic and atraumatic.   Ears:   Right Ear: External ear normal.   Left Ear: External ear normal.   Nose: Nose normal.   Mouth/Throat: Oropharynx is clear and moist.   Eyes: Conjunctivae, EOM and lids are normal. Pupils are equal, round, and reactive to light.   Neck: Trachea normal and phonation normal. Neck supple.   Cardiovascular: Normal rate, regular rhythm, normal heart sounds and normal pulses.   Pulmonary/Chest: Effort normal and breath sounds normal.   Musculoskeletal: Normal range of motion.         General: Normal range of motion.   Neurological: He is alert and oriented to person, place, and time.   Skin: Skin is warm, dry, intact and rash. Capillary refill takes less than 2 seconds.         Comments: Pruritic plaque with excoriations to left anterior neck.  No rash noted to right eyelid or right shoulder, but patient reports pruritus in these areas.  No vesicles.  No drainage.   Psychiatric: His speech is normal and behavior is normal. Judgment and thought content normal.   Nursing note and vitals reviewed.      Assessment:     1. Dermatitis due to plants, including poison ivy, sumac, and oak        Plan:       Dermatitis due to plants, including poison ivy, sumac, and oak  -     predniSONE (DELTASONE) 10 MG tablet; Take 4 tablets (40 mg total) by mouth once daily for 5 days, THEN 2 tablets (20 mg total) once daily for 5 days, THEN 1 tablet (10 mg total) once daily for 5 days.  Dispense: 35 tablet; Refill: 0  -     triamcinolone acetonide 0.1% (KENALOG) 0.1 % cream; Apply topically 2 (two) times daily. Do not apply to face for 10 days  Dispense: 15 g; Refill: 1        Suspect poison ivy dermatitis.  Do not suspect shingles or cellulitis.  Will start patient on oral prednisone taper and triamcinolone.          Patient Instructions   Take the steroids as prescribed.    You can apply the steroid cream to the rash in the neck and  shoulders for the next 7-10 days.  Do not apply this on your face.    - Follow up with your PCP or specialty clinic as directed in the next 1-2 weeks if not improved or as needed.  You can call [phone] to schedule an appointment with the appropriate provider.    - Go to the ER or seek medical attention immediately if you develop new or worsening symptoms.    - You must understand that you have received an Urgent Care treatment only and that you may be released before all of your medical problems are known or treated.   - You, the patient, will arrange for follow up care as instructed.   - If your condition worsens or fails to improve we recommend that you receive another evaluation at the ER immediately or contact your PCP to discuss your concerns or return here.

## 2024-10-25 NOTE — PATIENT INSTRUCTIONS
Take the steroids as prescribed.    You can apply the steroid cream to the rash in the neck and shoulders for the next 7-10 days.  Do not apply this on your face.    - Follow up with your PCP or specialty clinic as directed in the next 1-2 weeks if not improved or as needed.  You can call [phone] to schedule an appointment with the appropriate provider.    - Go to the ER or seek medical attention immediately if you develop new or worsening symptoms.    - You must understand that you have received an Urgent Care treatment only and that you may be released before all of your medical problems are known or treated.   - You, the patient, will arrange for follow up care as instructed.   - If your condition worsens or fails to improve we recommend that you receive another evaluation at the ER immediately or contact your PCP to discuss your concerns or return here.

## 2024-10-28 DIAGNOSIS — N52.9 ERECTILE DYSFUNCTION, UNSPECIFIED ERECTILE DYSFUNCTION TYPE: ICD-10-CM

## 2024-10-29 RX ORDER — SILDENAFIL 50 MG/1
TABLET, FILM COATED ORAL
Qty: 30 TABLET | Refills: 5 | Status: SHIPPED | OUTPATIENT
Start: 2024-10-29

## 2024-11-12 ENCOUNTER — OFFICE VISIT (OUTPATIENT)
Dept: INTERNAL MEDICINE | Facility: CLINIC | Age: 69
End: 2024-11-12
Attending: INTERNAL MEDICINE
Payer: COMMERCIAL

## 2024-11-12 VITALS
HEIGHT: 68 IN | WEIGHT: 171.31 LBS | DIASTOLIC BLOOD PRESSURE: 62 MMHG | OXYGEN SATURATION: 96 % | SYSTOLIC BLOOD PRESSURE: 132 MMHG | HEART RATE: 86 BPM | BODY MASS INDEX: 25.96 KG/M2

## 2024-11-12 DIAGNOSIS — D69.6 THROMBOCYTOPENIA: ICD-10-CM

## 2024-11-12 DIAGNOSIS — I10 ESSENTIAL HYPERTENSION: ICD-10-CM

## 2024-11-12 DIAGNOSIS — R73.01 IFG (IMPAIRED FASTING GLUCOSE): ICD-10-CM

## 2024-11-12 DIAGNOSIS — N40.1 BENIGN PROSTATIC HYPERPLASIA WITH URINARY FREQUENCY: ICD-10-CM

## 2024-11-12 DIAGNOSIS — Z00.00 ANNUAL PHYSICAL EXAM: ICD-10-CM

## 2024-11-12 DIAGNOSIS — E78.00 HYPERCHOLESTEROLEMIA: Primary | ICD-10-CM

## 2024-11-12 DIAGNOSIS — F41.8 SITUATIONAL ANXIETY: ICD-10-CM

## 2024-11-12 DIAGNOSIS — E53.8 B12 DEFICIENCY: ICD-10-CM

## 2024-11-12 DIAGNOSIS — D70.8 OTHER NEUTROPENIA: ICD-10-CM

## 2024-11-12 DIAGNOSIS — R35.0 BENIGN PROSTATIC HYPERPLASIA WITH URINARY FREQUENCY: ICD-10-CM

## 2024-11-12 PROCEDURE — 3288F FALL RISK ASSESSMENT DOCD: CPT | Mod: CPTII,S$GLB,, | Performed by: INTERNAL MEDICINE

## 2024-11-12 PROCEDURE — 1126F AMNT PAIN NOTED NONE PRSNT: CPT | Mod: CPTII,S$GLB,, | Performed by: INTERNAL MEDICINE

## 2024-11-12 PROCEDURE — 1160F RVW MEDS BY RX/DR IN RCRD: CPT | Mod: CPTII,S$GLB,, | Performed by: INTERNAL MEDICINE

## 2024-11-12 PROCEDURE — 1159F MED LIST DOCD IN RCRD: CPT | Mod: CPTII,S$GLB,, | Performed by: INTERNAL MEDICINE

## 2024-11-12 PROCEDURE — 99214 OFFICE O/P EST MOD 30 MIN: CPT | Mod: S$GLB,,, | Performed by: INTERNAL MEDICINE

## 2024-11-12 PROCEDURE — 1101F PT FALLS ASSESS-DOCD LE1/YR: CPT | Mod: CPTII,S$GLB,, | Performed by: INTERNAL MEDICINE

## 2024-11-12 PROCEDURE — 99999 PR PBB SHADOW E&M-EST. PATIENT-LVL V: CPT | Mod: PBBFAC,,, | Performed by: INTERNAL MEDICINE

## 2024-11-12 PROCEDURE — 4010F ACE/ARB THERAPY RXD/TAKEN: CPT | Mod: CPTII,S$GLB,, | Performed by: INTERNAL MEDICINE

## 2024-11-12 PROCEDURE — 3075F SYST BP GE 130 - 139MM HG: CPT | Mod: CPTII,S$GLB,, | Performed by: INTERNAL MEDICINE

## 2024-11-12 PROCEDURE — 3008F BODY MASS INDEX DOCD: CPT | Mod: CPTII,S$GLB,, | Performed by: INTERNAL MEDICINE

## 2024-11-12 PROCEDURE — 3078F DIAST BP <80 MM HG: CPT | Mod: CPTII,S$GLB,, | Performed by: INTERNAL MEDICINE

## 2024-11-12 RX ORDER — FLUOXETINE 10 MG/1
CAPSULE ORAL
Qty: 60 CAPSULE | Refills: 1 | Status: SHIPPED | OUTPATIENT
Start: 2024-11-12

## 2024-11-12 NOTE — PROGRESS NOTES
Subjective:   Patient ID: Cuate Fair is a 69 y.o. male  Chief complaint:   Chief Complaint   Patient presents with    Hypertension     F/u       HPI  Here for HTN follow up   Due fo rannual 1/2024    Reviewed prior labs collected 8/2024    Cbc results stable - leukopenia:   Previously:   Thrombocytopenia and neutropenia prior to this dx - spep and immunifix wnl  immunoglob wnl:  Labs stable   - seen by h/o 12/2019 - no indication for bm bx   - psa wnl  - no easy bruising or bleeding, hematuria or GIB  - No diarrhea, sore throat, fevers, joint swelling or pain or inc warmth or redness     B12 def: 984 <- 256   Taking suppl and improved     Htn:   Home readings are elevated - all 140s or higher / 80-90s   Reading today on home cuff 145/70 and our manual 132/62 - home cuff higher   + considerable amount of stress at work, home, [employer] - no time for rest - see below  Prev:  work load is edie rocketing per him and inc stress   No days off   Works 12 hour shifts and then in [employer] Sundays   Losartan 50 and amlodipine   C/w meds   Not resting prior to checking bp   taking bp first in am repeat bp at goal today and reviewed correct bp response   Home cuff has not been checked   Prev:  When dizziness had wrist cuff was 200/100  Home readings 140-150s/90s persistently  but not checking with rest   Cooking own food  Dizziness:   hAs not f/u with ent ts wanted to discuss first a this appt   Given meclizine and helpful on day he took it  Occ sx when standing quickly at times   Vision blurry   No double vision or loss of vision   No slurred speech or facial weakness  No headaches  Hit head a couple of times when working and stepping up to complete tasks as many doors are shorter than normal or hitting pipes    Anxiety:   Still quite significant - good fam support but overwhelmed  No si/hi/edwards   Discussed trial of ssri and amenable   Prev::   Significant work stress - hoping to retire after son completes degree at  "university   Wife running    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    Physical Exam  Vitals reviewed.   Constitutional:       Appearance: Normal appearance. He is well-developed.   HENT:      Head: Normocephalic and atraumatic.   Eyes:      Extraocular Movements: Extraocular movements intact.      Conjunctiva/sclera: Conjunctivae normal.   Neck:      Thyroid: No thyromegaly.   Cardiovascular:      Rate and Rhythm: Normal rate and regular rhythm.      Pulses: Normal pulses.      Heart sounds: Normal heart sounds.   Pulmonary:      Effort: Pulmonary effort is normal.      Breath sounds: Normal breath sounds.   Abdominal:      General: Bowel sounds are normal.      Palpations: Abdomen is soft.   Musculoskeletal:         General: No swelling or tenderness.      Cervical back: Neck supple.   Lymphadenopathy:      Cervical: No cervical adenopathy.   Skin:     General: Skin is warm and dry.      Capillary Refill: Capillary refill takes less than 2 seconds.   Neurological:      General: No focal deficit present.      Mental Status: He is alert and oriented to person, place, and time.   Psychiatric:         Mood and Affect: Mood normal.         Behavior: Behavior normal.         Thought Content: Thought content normal.         Judgment: Judgment normal.       Assessment:  1. Hypercholesterolemia    2. B12 deficiency    3. Essential hypertension    4. Benign prostatic hyperplasia with urinary frequency    5. Situational anxiety    6. " "Thrombocytopenia    7. Other neutropenia    8. IFG (impaired fasting glucose)    9. Annual physical exam      Plan:  Cuate Ivy" was seen today for hypertension.    Diagnoses and all orders for this visit:    Hypercholesterolemia  -     TSH; Future  -     Lipid Panel; Future    B12 deficiency  -     Vitamin B12; Future    Essential hypertension  -     CBC Auto Differential; Future  -     Comprehensive Metabolic Panel; Future    Benign prostatic hyperplasia with urinary frequency  -     PSA, Screening; Future    Situational anxiety  -     FLUoxetine 10 MG capsule; Take 10mg by mouth nightly for 1 week and then increase to 20mg nightly    Thrombocytopenia  -     CBC Auto Differential; Future    Other neutropenia    IFG (impaired fasting glucose)  -     Hemoglobin A1C; Future    Annual physical exam  -     PSA, Screening; Future  -     Hemoglobin A1C; Future  -     Vitamin B12; Future  -     TSH; Future  -     Lipid Panel; Future  -     CBC Auto Differential; Future  -     Comprehensive Metabolic Panel; Future    Start prozac  Potential side effects of medication discussed with patient. If the patient has any issues with medication, patient  understands to let me know. Return to clinic and ER prompts given.   To f/u in Jan for annual with labs and for med check or sooner if any issues  Cont current regimen   Obtian new bp cuff for home use   Reviewed correct bp monitoring tech    Visit today included increased complexity associated with the care of the episodic problem anxiety, htn addressed and managing the longitudinal care of the patient due to the serious and/or complex managed problem(s) hld, neutropenia, bph.    Health Maintenance   Topic Date Due    PROSTATE-SPECIFIC ANTIGEN  01/23/2025    Lipid Panel  01/23/2025    High Dose Statin  11/25/2025    TETANUS VACCINE  11/09/2028    Colorectal Cancer Screening  06/07/2031    Hepatitis C Screening  Completed    Shingles Vaccine  Completed           "

## 2024-11-26 ENCOUNTER — TELEPHONE (OUTPATIENT)
Dept: INTERNAL MEDICINE | Facility: CLINIC | Age: 69
End: 2024-11-26

## 2024-11-26 ENCOUNTER — HOSPITAL ENCOUNTER (OUTPATIENT)
Dept: RADIOLOGY | Facility: OTHER | Age: 69
Discharge: HOME | End: 2024-11-26
Attending: INTERNAL MEDICINE
Payer: COMMERCIAL

## 2024-11-26 ENCOUNTER — CLINICAL SUPPORT (OUTPATIENT)
Dept: INTERNAL MEDICINE | Facility: CLINIC | Age: 69
End: 2024-11-26
Payer: COMMERCIAL

## 2024-11-26 VITALS — HEART RATE: 67 BPM | OXYGEN SATURATION: 97 % | DIASTOLIC BLOOD PRESSURE: 78 MMHG | SYSTOLIC BLOOD PRESSURE: 122 MMHG

## 2024-11-26 DIAGNOSIS — R42 DIZZINESS AND GIDDINESS: ICD-10-CM

## 2024-11-26 DIAGNOSIS — Z01.30 BP CHECK: Primary | ICD-10-CM

## 2024-11-26 PROCEDURE — 70450 CT HEAD/BRAIN W/O DYE: CPT | Mod: 26,,, | Performed by: RADIOLOGY

## 2024-11-26 PROCEDURE — 70450 CT HEAD/BRAIN W/O DYE: CPT | Mod: TC

## 2024-11-26 PROCEDURE — 99999 PR PBB SHADOW E&M-EST. PATIENT-LVL III: CPT | Mod: PBBFAC,,,

## 2024-11-26 NOTE — TELEPHONE ENCOUNTER
"Cuate Fair 69 y.o. male is here for Blood Pressure check. in person    Manual Blood pressure reading was  128/74, Pulse 73. (Checked at the end of the visit)    If high, was it repeated after 15 minutes? yes  122/78 Pulse67    Pt's Home blood pressure machine read in office NO Pulse N/A.     Diagnosed with Hypertension yes.    Patient took blood pressure medication today yes.  Last dose of blood pressure medication was taken at 0600. Patient took 1. Amlodipine 5 mg PO daily 2. Losartan 50 mg daily .     All Medications and OTC medication updated yes    Does patient have record of home blood pressure readings / Blood Pressure Log no. States both wrist cuff and arm cuff not accurate, and plans to get another cuff. Given BP log sheets with information on BP ranges    Does the pt have any complaints today in regards to their blood pressure medication? no. Complains of None. Patient is asymptomatic.     Were you sitting still for 5-10 minutes prior to taking your Blood pressure? yes     Has your blood pressure monitor ever been checked? yes When was last time we checked your blood pressure monitor? " This month"    Updated vitals yes    Follow up date is 12/18/24.     Dr. Beth  notified.     Creatinine   Date Value Ref Range Status   08/23/2024 1.2 0.5 - 1.4 mg/dL Final     Sodium   Date Value Ref Range Status   08/23/2024 138 136 - 145 mmol/L Final     Potassium   Date Value Ref Range Status   08/23/2024 4.5 3.5 - 5.1 mmol/L Final       "

## 2024-11-26 NOTE — PROGRESS NOTES
"        Cuate Fair 69 y.o. male is here for Blood Pressure check. in person    Manual Blood pressure reading was  128/74, Pulse 73. (Checked at the end of the visit)    If high, was it repeated after 15 minutes? yes  122/78 Pulse67    Pt's Home blood pressure machine read in office NO Pulse N/A.     Diagnosed with Hypertension yes.    Patient took blood pressure medication today yes.  Last dose of blood pressure medication was taken at 0600. Patient took 1. Amlodipine 5 mg PO daily 2. Losartan 50 mg daily .     All Medications and OTC medication updated yes    Does patient have record of home blood pressure readings / Blood Pressure Log no. States both wrist cuff and arm cuff not accurate, and plans to get another cuff. Given BP log sheets with information on BP ranges    Does the pt have any complaints today in regards to their blood pressure medication? no. Complains of None. Patient is asymptomatic.     Were you sitting still for 5-10 minutes prior to taking your Blood pressure? yes     Has your blood pressure monitor ever been checked? yes When was last time we checked your blood pressure monitor? " This month"    Updated vitals yes    Follow up date is 12/18/24.     Dr. Beth  notified.     Creatinine   Date Value Ref Range Status   08/23/2024 1.2 0.5 - 1.4 mg/dL Final     Sodium   Date Value Ref Range Status   08/23/2024 138 136 - 145 mmol/L Final     Potassium   Date Value Ref Range Status   08/23/2024 4.5 3.5 - 5.1 mmol/L Final       "

## 2024-12-18 ENCOUNTER — OFFICE VISIT (OUTPATIENT)
Dept: INTERNAL MEDICINE | Facility: CLINIC | Age: 69
End: 2024-12-18
Attending: INTERNAL MEDICINE
Payer: COMMERCIAL

## 2024-12-18 DIAGNOSIS — Z51.81 MEDICATION MONITORING ENCOUNTER: ICD-10-CM

## 2024-12-18 DIAGNOSIS — F41.9 ANXIETY: Primary | ICD-10-CM

## 2024-12-18 PROCEDURE — 4010F ACE/ARB THERAPY RXD/TAKEN: CPT | Mod: CPTII,95,, | Performed by: INTERNAL MEDICINE

## 2024-12-18 PROCEDURE — 1159F MED LIST DOCD IN RCRD: CPT | Mod: CPTII,95,, | Performed by: INTERNAL MEDICINE

## 2024-12-18 PROCEDURE — 1101F PT FALLS ASSESS-DOCD LE1/YR: CPT | Mod: CPTII,95,, | Performed by: INTERNAL MEDICINE

## 2024-12-18 PROCEDURE — 1126F AMNT PAIN NOTED NONE PRSNT: CPT | Mod: CPTII,95,, | Performed by: INTERNAL MEDICINE

## 2024-12-18 PROCEDURE — 1160F RVW MEDS BY RX/DR IN RCRD: CPT | Mod: CPTII,95,, | Performed by: INTERNAL MEDICINE

## 2024-12-18 PROCEDURE — 3288F FALL RISK ASSESSMENT DOCD: CPT | Mod: CPTII,95,, | Performed by: INTERNAL MEDICINE

## 2024-12-18 PROCEDURE — 99441 PR PHYSICIAN TELEPHONE EVALUATION 5-10 MIN: CPT | Mod: 95,,, | Performed by: INTERNAL MEDICINE

## 2024-12-18 NOTE — PROGRESS NOTES
Audio Only Telehealth Visit     The patient location is: [place]  The chief complaint leading to consultation is: med follow up  Visit type: Virtual visit with audio only (telephone)  Total time spent with patient: 5 min     The reason for the audio only service rather than synchronous audio and video virtual visit was related to technical difficulties or patient preference/necessity.     Each patient to whom I provide medical services by telemedicine is:  (1) informed of the relationship between the physician and patient and the respective role of any other health care provider with respect to management of the patient; and (2) notified that they may decline to receive medical services by telemedicine and may withdraw from such care at any time. Patient verbally consented to receive this service via voice-only telephone call.       HPI:   Pt here to discuss starting medication and response to prozac  However reported today did not start prozac to date and will be off for 1 week and to start it next week when off for holidays   Stress level same and no chagnes in sx prev discussed     Assessment and plan:    Anxieyt and med monitoring: start prozac as prev discussed and f/u in 4 weeks for med check - can be VV or in person per his preferene        This service was not originating from a related E/M service provided within the previous 7 days nor will  to an E/M service or procedure within the next 24 hours or my soonest available appointment.  Prevailing standard of care was able to be met in this audio-only visit.

## 2025-03-14 ENCOUNTER — OFFICE VISIT (OUTPATIENT)
Dept: URGENT CARE | Facility: CLINIC | Age: 70
End: 2025-03-14
Payer: COMMERCIAL

## 2025-03-14 VITALS
HEART RATE: 105 BPM | TEMPERATURE: 103 F | SYSTOLIC BLOOD PRESSURE: 134 MMHG | WEIGHT: 168.63 LBS | OXYGEN SATURATION: 95 % | HEIGHT: 68 IN | BODY MASS INDEX: 25.56 KG/M2 | DIASTOLIC BLOOD PRESSURE: 73 MMHG | RESPIRATION RATE: 19 BRPM

## 2025-03-14 DIAGNOSIS — J02.9 PHARYNGITIS, UNSPECIFIED ETIOLOGY: Primary | ICD-10-CM

## 2025-03-14 DIAGNOSIS — R35.0 URINARY FREQUENCY: ICD-10-CM

## 2025-03-14 DIAGNOSIS — R59.0 CERVICAL ADENOPATHY: ICD-10-CM

## 2025-03-14 DIAGNOSIS — R50.9 FEVER, UNSPECIFIED FEVER CAUSE: ICD-10-CM

## 2025-03-14 DIAGNOSIS — J02.9 SORE THROAT: ICD-10-CM

## 2025-03-14 DIAGNOSIS — R52 GENERALIZED BODY ACHES: ICD-10-CM

## 2025-03-14 DIAGNOSIS — R05.1 ACUTE COUGH: ICD-10-CM

## 2025-03-14 LAB
BILIRUBIN, UA POC OHS: NEGATIVE
BLOOD, UA POC OHS: NEGATIVE
CLARITY, UA POC OHS: CLEAR
COLOR, UA POC OHS: YELLOW
CTP QC/QA: YES
GLUCOSE, UA POC OHS: NEGATIVE
KETONES, UA POC OHS: NEGATIVE
LEUKOCYTES, UA POC OHS: NEGATIVE
MOLECULAR STREP A: NEGATIVE
NITRITE, UA POC OHS: NEGATIVE
PH, UA POC OHS: 5.5
POC MOLECULAR INFLUENZA A AGN: NEGATIVE
POC MOLECULAR INFLUENZA B AGN: NEGATIVE
PROTEIN, UA POC OHS: NEGATIVE
SARS CORONAVIRUS 2 ANTIGEN: NEGATIVE
SPECIFIC GRAVITY, UA POC OHS: 1.02
UROBILINOGEN, UA POC OHS: 0.2

## 2025-03-14 RX ORDER — KETOROLAC TROMETHAMINE 30 MG/ML
15 INJECTION, SOLUTION INTRAMUSCULAR; INTRAVENOUS
Status: COMPLETED | OUTPATIENT
Start: 2025-03-14 | End: 2025-03-14

## 2025-03-14 RX ORDER — ACETAMINOPHEN 500 MG
1000 TABLET ORAL
Status: COMPLETED | OUTPATIENT
Start: 2025-03-14 | End: 2025-03-14

## 2025-03-14 RX ORDER — AMOXICILLIN 500 MG/1
500 TABLET, FILM COATED ORAL EVERY 12 HOURS
Qty: 20 TABLET | Refills: 0 | Status: SHIPPED | OUTPATIENT
Start: 2025-03-14 | End: 2025-03-24

## 2025-03-14 RX ORDER — PREDNISONE 20 MG/1
20 TABLET ORAL DAILY
Qty: 5 TABLET | Refills: 0 | Status: SHIPPED | OUTPATIENT
Start: 2025-03-14 | End: 2025-03-19

## 2025-03-14 RX ADMIN — Medication 1000 MG: at 06:03

## 2025-03-14 RX ADMIN — KETOROLAC TROMETHAMINE 15 MG: 30 INJECTION, SOLUTION INTRAMUSCULAR; INTRAVENOUS at 07:03

## 2025-03-14 NOTE — PROGRESS NOTES
"Subjective:      Patient ID: Cuate Fair is a 69 y.o. male.    Vitals:  height is 5' 8" (1.727 m) and weight is 76.5 kg (168 lb 10.4 oz). His oral temperature is 102.9 °F (39.4 °C) (abnormal). His blood pressure is 134/73 and his pulse is 105. His respiration is 19 and oxygen saturation is 95%.     Chief Complaint: Sore Throat    69-year-old male presents with a sore throat, painful swallowing, nonproductive cough, chills, fever, body aches, fatigue, joint pain started yesterday.  He gargled warm water with no improvement.  No meds taken for fever today.  He also states he ate some bad food yesterday and he has been urinating more frequent.  Denies CVA tenderness or flank pain.  Denies dysuria.  He denies any shortness of breath, chest pain, GI complaints, or any other symptoms.        Sore Throat   This is a new problem. The current episode started yesterday. The problem has been unchanged. There has been no fever. The pain is at a severity of 10/10. The pain is severe. Associated symptoms include coughing, a hoarse voice and swollen glands. Pertinent negatives include no abdominal pain, congestion, diarrhea, drooling, ear discharge, ear pain, headaches, plugged ear sensation, neck pain, shortness of breath, stridor, trouble swallowing or vomiting. He has had no exposure to strep or mono. Treatments tried: gargle with warm water. The treatment provided no relief.       Constitution: Positive for chills and fever. Negative for activity change, appetite change, sweating and fatigue.   HENT:  Positive for sore throat. Negative for ear pain, ear discharge, foreign body in ear, tinnitus, hearing loss, dental problem, drooling, congestion, nosebleeds, foreign body in nose, postnasal drip, sinus pain, sinus pressure and trouble swallowing.    Neck: Negative for neck pain, neck stiffness, painful lymph nodes and neck swelling.   Cardiovascular:  Negative for chest trauma, chest pain and leg swelling.   Eyes:  " Negative for eye trauma, foreign body in eye and eye discharge.   Respiratory:  Positive for cough. Negative for sleep apnea, chest tightness, sputum production, bloody sputum, COPD, shortness of breath, stridor, wheezing and asthma.    Gastrointestinal:  Negative for abdominal trauma, abdominal pain, abdominal bloating, history of abdominal surgery, nausea, vomiting and diarrhea.   Endocrine: hair loss and cold intolerance.   Genitourinary:  Positive for frequency. Negative for dysuria, urgency, urine decreased, flank pain, bladder incontinence, bed wetting, hematuria, history of kidney stones and genital trauma.   Musculoskeletal:  Negative for pain, trauma, joint pain and joint swelling.   Skin:  Negative for color change, pale, rash and wound.   Allergic/Immunologic: Negative for environmental allergies, seasonal allergies, food allergies, eczema and asthma.   Neurological:  Negative for dizziness, history of vertigo, light-headedness, headaches, history of migraines, disorientation and altered mental status.   Hematologic/Lymphatic: Negative for swollen lymph nodes, easy bruising/bleeding and trouble clotting. Does not bruise/bleed easily.   Psychiatric/Behavioral:  Negative for altered mental status, disorientation, confusion and agitation.       Objective:     Physical Exam   Constitutional: He is oriented to person, place, and time. He appears well-developed. He is cooperative.  Non-toxic appearance. He does not appear ill. No distress.   HENT:   Head: Normocephalic and atraumatic.   Ears:   Right Ear: Hearing, tympanic membrane, external ear and ear canal normal.   Left Ear: Hearing, tympanic membrane, external ear and ear canal normal.   Nose: Nose normal. No mucosal edema, rhinorrhea, nasal deformity or congestion. No epistaxis. Right sinus exhibits no maxillary sinus tenderness and no frontal sinus tenderness. Left sinus exhibits no maxillary sinus tenderness and no frontal sinus tenderness.    Mouth/Throat: Uvula is midline and mucous membranes are normal. Mucous membranes are moist. No trismus in the jaw. Normal dentition. No uvula swelling. Oropharyngeal exudate and posterior oropharyngeal erythema present. No posterior oropharyngeal edema.   Eyes: Conjunctivae and lids are normal. No scleral icterus.   Neck: Trachea normal and phonation normal. Neck supple. No edema present. No erythema present. No neck rigidity present.   Cardiovascular: Normal rate, regular rhythm, normal heart sounds and normal pulses.   Pulmonary/Chest: Effort normal and breath sounds normal. No stridor. No respiratory distress. He has no decreased breath sounds. He has no wheezes. He has no rhonchi. He has no rales. He exhibits no tenderness.   Abdominal: Normal appearance and bowel sounds are normal. Soft.   Musculoskeletal: Normal range of motion.         General: No deformity. Normal range of motion.   Lymphadenopathy:     He has cervical adenopathy.   Neurological: He is alert and oriented to person, place, and time. He exhibits normal muscle tone. Coordination normal.   Skin: Skin is warm, dry, intact, not diaphoretic and not pale.   Psychiatric: His speech is normal and behavior is normal. Judgment and thought content normal.   Nursing note and vitals reviewed.    Results for orders placed or performed in visit on 03/14/25   POCT Strep A, Molecular    Collection Time: 03/14/25  6:49 PM   Result Value Ref Range    Molecular Strep A, POC Negative Negative     Acceptable Yes    POCT Influenza A/B MOLECULAR    Collection Time: 03/14/25  6:53 PM   Result Value Ref Range    POC Molecular Influenza A Ag Negative Negative    POC Molecular Influenza B Ag Negative Negative     Acceptable Yes    SARS Coronavirus 2 Antigen, POCT Manual Read    Collection Time: 03/14/25  6:54 PM   Result Value Ref Range    SARS Coronavirus 2 Antigen Negative Negative, Presumptive Negative     Acceptable Yes     POCT Urinalysis(Instrument)    Collection Time: 03/14/25  7:06 PM   Result Value Ref Range    Color, POC UA Yellow Yellow, Straw, Colorless    Clarity, POC UA Clear Clear    Glucose, POC UA Negative Negative    Bilirubin, POC UA Negative Negative    Ketones, POC UA Negative Negative    Spec Grav POC UA 1.020 1.005 - 1.030    Blood, POC UA Negative Negative    pH, POC UA 5.5 5.0 - 8.0    Protein, POC UA Negative Negative    Urobilinogen, POC UA 0.2 <=1.0    Nitrite, POC UA Negative Negative    WBC, POC UA Negative Negative        Assessment:     1. Pharyngitis, unspecified etiology    2. Sore throat    3. Fever, unspecified fever cause    4. Urinary frequency    5. Acute cough    6. Generalized body aches    7. Cervical adenopathy        Plan:       Pharyngitis, unspecified etiology  -     amoxicillin (AMOXIL) 500 MG Tab; Take 1 tablet (500 mg total) by mouth every 12 (twelve) hours. for 10 days  Dispense: 20 tablet; Refill: 0  -     predniSONE (DELTASONE) 20 MG tablet; Take 1 tablet (20 mg total) by mouth once daily. for 5 days  Dispense: 5 tablet; Refill: 0    Sore throat  -     POCT Strep A, Molecular  -     diphenhydrAMINE-aluminum-magnesium hydroxide-simethicone-LIDOcaine viscous HCl 2%; Swish and spit 15 mLs every 4 (four) hours as needed (Sore throat). 220ml total  Dispense: 220 each; Refill: 0    Fever, unspecified fever cause  -     POCT Influenza A/B MOLECULAR  -     SARS Coronavirus 2 Antigen, POCT Manual Read  -     acetaminophen tablet 1,000 mg    Urinary frequency  -     POCT Urinalysis(Instrument)    Acute cough    Generalized body aches  -     ketorolac injection 15 mg    Cervical adenopathy             Additional MDM:     Heart Failure Score:   COPD = No

## 2025-03-15 NOTE — PATIENT INSTRUCTIONS
Amoxicillin twice a day for 10 days.  Magic mouthwash swish and spit as needed for sore throat.  Prednisone once daily for 5 days    When do I need to call the doctor?   You have very bad pain in your throat that keeps you from eating or drinking anything.  There are large, painful lumps in your neck.  You have blisters in the back of your throat.  You have new or worsening symptoms.  - Rest.    - Drink plenty of fluids.    - Acetaminophen (tylenol) or Ibuprofen (advil,motrin) as directed as needed for fever/pain. Avoid tylenol if you have a history of liver disease. Do not take ibuprofen if you have a history of GI bleeding, kidney disease, or if you take blood thinners.     - Follow up with your PCP or specialty clinic as directed in the next 1-2 weeks if not improved or as needed.  You can call [phone] to schedule an appointment with the appropriate provider.    - Go to the ER or seek medical attention immediately if you develop new or worsening symptoms.     - You must understand that you have received an Urgent Care treatment only and that you may be released before all of your medical problems are known or treated.   - You, the patient, will arrange for follow up care as instructed.   - If your condition worsens or fails to improve we recommend that you receive another evaluation at the ER immediately or contact your PCP to discuss your concerns or return here.

## 2025-03-28 ENCOUNTER — OFFICE VISIT (OUTPATIENT)
Dept: URGENT CARE | Facility: CLINIC | Age: 70
End: 2025-03-28
Payer: COMMERCIAL

## 2025-03-28 VITALS
SYSTOLIC BLOOD PRESSURE: 124 MMHG | HEART RATE: 97 BPM | HEIGHT: 68 IN | BODY MASS INDEX: 25.56 KG/M2 | RESPIRATION RATE: 19 BRPM | WEIGHT: 168.63 LBS | DIASTOLIC BLOOD PRESSURE: 60 MMHG | TEMPERATURE: 99 F | OXYGEN SATURATION: 95 %

## 2025-03-28 DIAGNOSIS — H61.22 IMPACTED CERUMEN OF LEFT EAR: ICD-10-CM

## 2025-03-28 DIAGNOSIS — H66.92 ACUTE BACTERIAL OTITIS MEDIA, LEFT: Primary | ICD-10-CM

## 2025-03-28 DIAGNOSIS — H60.92 OTITIS EXTERNA OF LEFT EAR, UNSPECIFIED CHRONICITY, UNSPECIFIED TYPE: ICD-10-CM

## 2025-03-28 DIAGNOSIS — R05.9 COUGH IN ADULT: ICD-10-CM

## 2025-03-28 RX ORDER — AZITHROMYCIN 500 MG/1
500 TABLET, FILM COATED ORAL DAILY
Qty: 3 TABLET | Refills: 0 | Status: SHIPPED | OUTPATIENT
Start: 2025-03-28 | End: 2025-03-31

## 2025-03-28 RX ORDER — NEOMYCIN SULFATE, POLYMYXIN B SULFATE, HYDROCORTISONE 3.5; 10000; 1 MG/ML; [USP'U]/ML; MG/ML
3 SOLUTION/ DROPS AURICULAR (OTIC) 3 TIMES DAILY
Qty: 10 ML | Refills: 0 | Status: SHIPPED | OUTPATIENT
Start: 2025-03-28 | End: 2025-04-07

## 2025-03-28 RX ORDER — PROMETHAZINE HYDROCHLORIDE AND DEXTROMETHORPHAN HYDROBROMIDE 6.25; 15 MG/5ML; MG/5ML
5 SYRUP ORAL EVERY 4 HOURS PRN
Qty: 180 ML | Refills: 0 | Status: SHIPPED | OUTPATIENT
Start: 2025-03-28 | End: 2025-04-07

## 2025-03-28 NOTE — PATIENT INSTRUCTIONS
"  Cough in adult  -     X-Ray Chest PA And Lateral    Impacted cerumen of left ear  -     Ear Cerumen Removal    Otitis externa of left ear, unspecified chronicity, unspecified type    -     neomycin-polymyxin-hydrocortisone (CORTISPORIN) otic solution; Place 3 drops into the left ear 3 (three) times daily. for 10 days  Dispense: 10 mL; Refill: 0    Acute bacterial otitis media, left    -     azithromycin (ZITHROMAX) 500 MG tablet; Take 1 tablet (500 mg total) by mouth once daily. for 3 days  Dispense: 3 tablet; Refill: 0      - Rest at home.     - Drink plenty of fluids so you won't get dehydrated.      Avoid taking Decongestants such as pseudoephedrine (ex. Sudafed) or phenylephrine (ex. Mucinex FastMax, Dayquil, Nyquil, or any combo cold meds that say "cold," "sinus" or "-D").        - Cough recommendations:   Warm tea with honey can help with cough. Honey is a natural cough suppressant.     NIGHTTIME:  -     (PROMETHAZINE-DM) promethazine-dextromethorphan 6.25-15 mg/5 mL Syrp; Take 5 mLs by mouth every 4 (four) hours as needed (cough).    +  Mucinex (guaifenesin) twice a day (or as directed) to help loosen mucous.       OR    DAYTIME:   Mucinex DM (guaifenesin + dextromethorphan).        - Fever/Pain recommendations:  Alternate Tylenol or Ibuprofen as directed for fever/pain.   - Motrin/Ibuprofen every 6-8 hours for pain and inflammation. Do not take ibuprofen if you have a history of GI bleeding, kidney disease, or if you take blood thinners.    - Tylenol/acetaminophen every 6-8 hours for added pain relief.  Avoid tylenol if you have a history of liver disease.       -Sore throat recommendations: Warm fluids, warm salt water gargles, throat lozenges, tea, honey, soup, or drinking something cold or frozen.  Throat lozenges or sprays help reduce pain. Gargling with warm saltwater (1/4 teaspoon of salt in 1/2 cup of warm water) or an OTC anesthetic gargle may be useful for irritation.      When to seek medical " advice  Call your healthcare provider right away if any of these occur:  Fever that is poorly controlled with OTC fever reducing medication  New or worsening ear pain, sinus pain, or headache  Stiff neck  You can't swallow liquids or you can't open your mouth wide because of throat pain  Signs of dehydration. These include very dark urine or no urine, sunken eyes, and dizziness.  Trouble breathing or noisy breathing  Muffled voice  Rash

## 2025-03-28 NOTE — PROCEDURES
Ear Cerumen Removal    Date/Time: 3/28/2025 4:30 PM    Performed by: Mallory Alfred FNP-C  Authorized by: Mallory Alfred FNP-C    Consent Done?:  Yes (Verbal)    Local anesthetic:  None  Ceruminolytics applied: Ceruminolytics applied prior to the procedure    Medication Used:  Cerumenex  Location details:  Left ear  Procedure type: irrigation    Cerumen  Removal Results:  Cerumen completely removed  Patient tolerance:  Patient tolerated the procedure well with no immediate complications   Withheld/Decline to Answer

## 2025-03-28 NOTE — PROGRESS NOTES
"Subjective:      Patient ID: Cuate Fair is a 69 y.o. male.    Vitals:  height is 5' 8" (1.727 m) and weight is 76.5 kg (168 lb 10.4 oz). His oral temperature is 98.7 °F (37.1 °C). His blood pressure is 124/60 and his pulse is 97. His respiration is 19 and oxygen saturation is 95%.     Chief Complaint: Sore Throat    Pt is a 70 yo male presenting with sore throat and productive cough with green sputum.  Pt reports onset approx 2x weeks ago. Pt reports he was seen here 03/14/25 dx Pharyngitis/sinusitis and given rx of amoxicillin and steroid.  Pt reports he had mild relief but the cough never resolved. Pt states the last few days were worse than the initial illness.  Pt denies fever, emesis, sob, and trouble swallowing.     Sore Throat   This is a new problem. The current episode started 1 to 4 weeks ago. The problem has been gradually improving. There has been no fever. The pain is at a severity of 7/10. The pain is moderate. Associated symptoms include coughing. Pertinent negatives include no abdominal pain, congestion, diarrhea, drooling, ear discharge, ear pain, headaches, hoarse voice, plugged ear sensation, neck pain, shortness of breath, stridor, swollen glands, trouble swallowing or vomiting. He has had no exposure to strep or mono. Treatments tried: deltasone and amoxicillin. The treatment provided mild relief.       Constitution: Negative for chills, fatigue and fever.   HENT:  Positive for sore throat. Negative for ear pain, ear discharge, drooling, congestion, sinus pain and trouble swallowing.    Neck: Negative for neck pain.   Cardiovascular:  Negative for chest pain.   Respiratory:  Positive for cough and sputum production. Negative for shortness of breath and stridor.    Gastrointestinal:  Negative for abdominal pain, nausea, vomiting and diarrhea.   Neurological:  Negative for headaches.      Objective:     Physical Exam   Constitutional: He is oriented to person, place, and time.   HENT: "   Head: Normocephalic.   Ears:   Right Ear: Hearing and external ear normal. No no drainage, swelling or tenderness. Tympanic membrane is not erythematous, not retracted and not bulging. No middle ear effusion.   Left Ear: Hearing and external ear normal. There is swelling and tenderness. No no drainage. Tympanic membrane is erythematous. Tympanic membrane is not retracted and not bulging. A middle ear effusion is present. impacted cerumen  Ears:    Nose: Nose normal. No mucosal edema, rhinorrhea or purulent discharge. Right sinus exhibits no maxillary sinus tenderness and no frontal sinus tenderness. Left sinus exhibits no maxillary sinus tenderness and no frontal sinus tenderness.   Mouth/Throat: Uvula is midline and mucous membranes are normal. No trismus in the jaw. No uvula swelling. Posterior oropharyngeal erythema present. No oropharyngeal exudate or posterior oropharyngeal edema.   Eyes: EOM are normal.   Cardiovascular: Normal rate and regular rhythm.   Pulmonary/Chest: Effort normal and breath sounds normal. No accessory muscle usage or stridor. No respiratory distress. He has no decreased breath sounds. He has no wheezes. He has no rhonchi. He has no rales.   Musculoskeletal: Normal range of motion.         General: Normal range of motion.   Neurological: He is alert and oriented to person, place, and time.   Skin: Skin is warm and dry.   Nursing note and vitals reviewed.    Ear Cerumen Removal    Date/Time: 3/28/2025 4:30 PM    Performed by: Mallory Alfred FNP-C  Authorized by: Mallory Alfred FNP-C    Consent Done?:  Yes (Verbal)    Local anesthetic:  None  Ceruminolytics applied: Ceruminolytics applied prior to the procedure    Medication Used:  Cerumenex  Location details:  Left ear  Procedure type: irrigation    Cerumen  Removal Results:  Cerumen completely removed  Patient tolerance:  Patient tolerated the procedure well with no immediate complications       Assessment:     1. Acute bacterial otitis  "media, left    2. Impacted cerumen of left ear    3. Otitis externa of left ear, unspecified chronicity, unspecified type    4. Cough in adult        Plan:       Acute bacterial otitis media, left  -     azithromycin (ZITHROMAX) 500 MG tablet; Take 1 tablet (500 mg total) by mouth once daily. for 3 days  Dispense: 3 tablet; Refill: 0    Impacted cerumen of left ear  -     Ear wax removal  -     Ear Cerumen Removal    Otitis externa of left ear, unspecified chronicity, unspecified type  -     neomycin-polymyxin-hydrocortisone (CORTISPORIN) otic solution; Place 3 drops into the left ear 3 (three) times daily. for 10 days  Dispense: 10 mL; Refill: 0    Cough in adult  -     X-Ray Chest PA And Lateral; Future; Expected date: 03/28/2025  -     promethazine-dextromethorphan (PROMETHAZINE-DM) 6.25-15 mg/5 mL Syrp; Take 5 mLs by mouth every 4 (four) hours as needed (cough).  Dispense: 180 mL; Refill: 0      Patient Instructions     Cough in adult  -     X-Ray Chest PA And Lateral    Impacted cerumen of left ear  -     Ear Cerumen Removal    Otitis externa of left ear, unspecified chronicity, unspecified type    -     neomycin-polymyxin-hydrocortisone (CORTISPORIN) otic solution; Place 3 drops into the left ear 3 (three) times daily. for 10 days  Dispense: 10 mL; Refill: 0    Acute bacterial otitis media, left    -     azithromycin (ZITHROMAX) 500 MG tablet; Take 1 tablet (500 mg total) by mouth once daily. for 3 days  Dispense: 3 tablet; Refill: 0      - Rest at home.     - Drink plenty of fluids so you won't get dehydrated.      Avoid taking Decongestants such as pseudoephedrine (ex. Sudafed) or phenylephrine (ex. Mucinex FastMax, Dayquil, Nyquil, or any combo cold meds that say "cold," "sinus" or "-D").        - Cough recommendations:   Warm tea with honey can help with cough. Honey is a natural cough suppressant.     NIGHTTIME:  -     (PROMETHAZINE-DM) promethazine-dextromethorphan 6.25-15 mg/5 mL Syrp; Take 5 mLs by " mouth every 4 (four) hours as needed (cough).    +  Mucinex (guaifenesin) twice a day (or as directed) to help loosen mucous.       OR    DAYTIME:   Mucinex DM (guaifenesin + dextromethorphan).        - Fever/Pain recommendations:  Alternate Tylenol or Ibuprofen as directed for fever/pain.   - Motrin/Ibuprofen every 6-8 hours for pain and inflammation. Do not take ibuprofen if you have a history of GI bleeding, kidney disease, or if you take blood thinners.    - Tylenol/acetaminophen every 6-8 hours for added pain relief.  Avoid tylenol if you have a history of liver disease.       -Sore throat recommendations: Warm fluids, warm salt water gargles, throat lozenges, tea, honey, soup, or drinking something cold or frozen.  Throat lozenges or sprays help reduce pain. Gargling with warm saltwater (1/4 teaspoon of salt in 1/2 cup of warm water) or an OTC anesthetic gargle may be useful for irritation.      When to seek medical advice  Call your healthcare provider right away if any of these occur:  Fever that is poorly controlled with OTC fever reducing medication  New or worsening ear pain, sinus pain, or headache  Stiff neck  You can't swallow liquids or you can't open your mouth wide because of throat pain  Signs of dehydration. These include very dark urine or no urine, sunken eyes, and dizziness.  Trouble breathing or noisy breathing  Muffled voice  Rash

## 2025-03-29 ENCOUNTER — RESULTS FOLLOW-UP (OUTPATIENT)
Dept: URGENT CARE | Facility: CLINIC | Age: 70
End: 2025-03-29

## 2025-05-06 DIAGNOSIS — N52.9 ERECTILE DYSFUNCTION, UNSPECIFIED ERECTILE DYSFUNCTION TYPE: ICD-10-CM

## 2025-05-06 DIAGNOSIS — E78.00 HYPERCHOLESTEROLEMIA: ICD-10-CM

## 2025-05-06 DIAGNOSIS — I10 HTN (HYPERTENSION), BENIGN: ICD-10-CM

## 2025-05-06 RX ORDER — SILDENAFIL 50 MG/1
TABLET, FILM COATED ORAL
Qty: 30 TABLET | Refills: 0 | Status: SHIPPED | OUTPATIENT
Start: 2025-05-06

## 2025-05-06 RX ORDER — AMLODIPINE BESYLATE 5 MG/1
5 TABLET ORAL DAILY
Qty: 90 TABLET | Refills: 0 | Status: SHIPPED | OUTPATIENT
Start: 2025-05-06

## 2025-05-06 NOTE — TELEPHONE ENCOUNTER
----- Message from Ashley sent at 5/6/2025  3:51 PM CDT -----  Regarding: Self   Type: RX Refill RequestWho Called: Self Have you contacted your pharmacy: yes  PA or new rx is needed RefillRX Name and Strength:amLODIPine (NORVASC) 5 MG tablet , sildenafiL (VIAGRA) 50 MG tablet Preferred Pharmacy with phone number: .[id number] - [place], [place] - [address] AT [facility] [id number] [address]Xywsr: [phone] Fax: [phone] or Mail Order: local Would the patient rather a call back or a response via [facility]? Call back Best Call Back Number:.[phone] Information: Thank you.

## 2025-05-06 NOTE — TELEPHONE ENCOUNTER
Care Due:                  Date            Visit Type   Department     Provider  --------------------------------------------------------------------------------                                VIRTUAL      [facility] INTERNAL  Last Visit: 12-      AUDIO ONLY   MEDICINE       Carolyne Beth  Next Visit: None Scheduled  None         None Found                                                            Last  Test          Frequency    Reason                     Performed    Due Date  --------------------------------------------------------------------------------    Lipid Panel.  12 months..  atorvastatin.............  01- 01-    [facility] Embedded Care Due Messages. Reference number: [id number].   5/06/2025 4:49:49 PM CDT

## 2025-05-06 NOTE — TELEPHONE ENCOUNTER
Refill Encounter    PCP Visits: Recent Visits  Date Type Provider Dept   12/18/24 Office Visit Carolyne Beth MD [facility] Internal Medicine   11/12/24 Office Visit Carolyne Beth MD [facility] Medicine   09/10/24 Office Visit Carolyne Beth MD [facility] Medicine   05/29/24 Office Visit Adriel Go MD [facility] Medicine   Showing recent visits within past 360 days and meeting all other requirements  Future Appointments  No visits were found meeting these conditions.  Showing future appointments within next 720 days and meeting all other requirements      Last 3 Blood Pressure:   BP Readings from Last 3 Encounters:   03/28/25 124/60   03/14/25 134/73   11/26/24 122/78     Preferred Pharmacy:   [id number] - [place], [place] - [address] AT [facility]  [address] [place] [address]  Phone: [phone] Fax: [phone]    [id number] [place], [place] - [address] AT [facility] [address]  [address]  [facility] [address]  Phone: [phone] Fax: [phone]    Requested RX:  Requested Prescriptions     Pending Prescriptions Disp Refills    amLODIPine (NORVASC) 5 MG tablet 90 tablet 0     Sig: Take 1 tablet (5 mg total) by mouth once daily.    sildenafiL (VIAGRA) 50 MG tablet 30 tablet 0     Sig: TAKE 1 TABLET(50 MG) BY MOUTH DAILY 30 MINUTES BEFORE SEXUAL ACTIVITY AS NEEDED FOR ERECTILE DYSFUNCTION      RX Route: Normal

## 2025-05-07 ENCOUNTER — HOSPITAL ENCOUNTER (EMERGENCY)
Facility: OTHER | Age: 70
Discharge: HOME | End: 2025-05-07
Attending: EMERGENCY MEDICINE
Payer: COMMERCIAL

## 2025-05-07 VITALS
SYSTOLIC BLOOD PRESSURE: 145 MMHG | HEART RATE: 80 BPM | OXYGEN SATURATION: 97 % | WEIGHT: 168 LBS | HEIGHT: 68 IN | RESPIRATION RATE: 14 BRPM | TEMPERATURE: 98 F | BODY MASS INDEX: 25.46 KG/M2 | DIASTOLIC BLOOD PRESSURE: 77 MMHG

## 2025-05-07 DIAGNOSIS — R42 DIZZINESS: Primary | ICD-10-CM

## 2025-05-07 DIAGNOSIS — R42 VERTIGO: ICD-10-CM

## 2025-05-07 PROBLEM — R26.89 IMBALANCE: Status: ACTIVE | Noted: 2025-05-07

## 2025-05-07 LAB
ABSOLUTE EOSINOPHIL (OHS): 0.08 K/UL
ABSOLUTE MONOCYTE (OHS): 0.36 K/UL (ref 0.3–1)
ABSOLUTE NEUTROPHIL COUNT (OHS): 3.81 K/UL (ref 1.8–7.7)
ALBUMIN SERPL BCP-MCNC: 4.5 G/DL (ref 3.5–5.2)
ALP SERPL-CCNC: 68 UNIT/L (ref 40–150)
ALT SERPL W/O P-5'-P-CCNC: 33 UNIT/L (ref 10–44)
ANION GAP (OHS): 13 MMOL/L (ref 8–16)
AST SERPL-CCNC: 37 UNIT/L (ref 11–45)
BASOPHILS # BLD AUTO: 0.03 K/UL
BASOPHILS NFR BLD AUTO: 0.5 %
BILIRUB SERPL-MCNC: 0.9 MG/DL (ref 0.1–1)
BUN SERPL-MCNC: 15 MG/DL (ref 8–23)
CALCIUM SERPL-MCNC: 9.7 MG/DL (ref 8.7–10.5)
CHLORIDE SERPL-SCNC: 105 MMOL/L (ref 95–110)
CHOLEST SERPL-MCNC: 173 MG/DL (ref 120–199)
CHOLEST/HDLC SERPL: 3.8 {RATIO} (ref 2–5)
CO2 SERPL-SCNC: 20 MMOL/L (ref 23–29)
CREAT SERPL-MCNC: 1.1 MG/DL (ref 0.5–1.4)
CREAT SERPL-MCNC: 1.2 MG/DL (ref 0.5–1.4)
ERYTHROCYTE [DISTWIDTH] IN BLOOD BY AUTOMATED COUNT: 12.8 % (ref 11.5–14.5)
GFR SERPLBLD CREATININE-BSD FMLA CKD-EPI: >60 ML/MIN/1.73/M2
GLUCOSE SERPL-MCNC: 108 MG/DL (ref 70–110)
HCT VFR BLD AUTO: 52.2 % (ref 40–54)
HDLC SERPL-MCNC: 46 MG/DL (ref 40–75)
HDLC SERPL: 26.6 % (ref 20–50)
HGB BLD-MCNC: 17.9 GM/DL (ref 14–18)
HOLD SPECIMEN: NORMAL
HOLD SPECIMEN: NORMAL
IMM GRANULOCYTES # BLD AUTO: 0.01 K/UL (ref 0–0.04)
IMM GRANULOCYTES NFR BLD AUTO: 0.2 % (ref 0–0.5)
INR PPP: 1.1 (ref 0.8–1.2)
LDLC SERPL CALC-MCNC: 114.2 MG/DL (ref 63–159)
LYMPHOCYTES # BLD AUTO: 1.21 K/UL (ref 1–4.8)
MCH RBC QN AUTO: 29.9 PG (ref 27–31)
MCHC RBC AUTO-ENTMCNC: 34.3 G/DL (ref 32–36)
MCV RBC AUTO: 87 FL (ref 82–98)
NONHDLC SERPL-MCNC: 127 MG/DL
NUCLEATED RBC (/100WBC) (OHS): 0 /100 WBC
PLATELET # BLD AUTO: 165 K/UL (ref 150–450)
PMV BLD AUTO: 10.3 FL (ref 9.2–12.9)
POCT GLUCOSE: 114 MG/DL (ref 70–110)
POTASSIUM SERPL-SCNC: 4.1 MMOL/L (ref 3.5–5.1)
PROT SERPL-MCNC: 8.5 GM/DL (ref 6–8.4)
PROTHROMBIN TIME: 12.1 SECONDS (ref 9–12.5)
RBC # BLD AUTO: 5.98 M/UL (ref 4.6–6.2)
RELATIVE EOSINOPHIL (OHS): 1.5 %
RELATIVE LYMPHOCYTE (OHS): 22 % (ref 18–48)
RELATIVE MONOCYTE (OHS): 6.5 % (ref 4–15)
RELATIVE NEUTROPHIL (OHS): 69.3 % (ref 38–73)
SAMPLE: NORMAL
SODIUM SERPL-SCNC: 138 MMOL/L (ref 136–145)
TRIGL SERPL-MCNC: 64 MG/DL (ref 30–150)
TROPONIN I SERPL DL<=0.01 NG/ML-MCNC: <0.006 NG/ML
TSH SERPL-ACNC: 0.58 UIU/ML (ref 0.4–4)
WBC # BLD AUTO: 5.5 K/UL (ref 3.9–12.7)

## 2025-05-07 PROCEDURE — 85610 PROTHROMBIN TIME: CPT | Performed by: EMERGENCY MEDICINE

## 2025-05-07 PROCEDURE — 82465 ASSAY BLD/SERUM CHOLESTEROL: CPT | Performed by: EMERGENCY MEDICINE

## 2025-05-07 PROCEDURE — 96361 HYDRATE IV INFUSION ADD-ON: CPT

## 2025-05-07 PROCEDURE — 99285 EMERGENCY DEPT VISIT HI MDM: CPT | Mod: 25

## 2025-05-07 PROCEDURE — 96375 TX/PRO/DX INJ NEW DRUG ADDON: CPT

## 2025-05-07 PROCEDURE — 84443 ASSAY THYROID STIM HORMONE: CPT | Performed by: EMERGENCY MEDICINE

## 2025-05-07 PROCEDURE — 63600175 PHARM REV CODE 636 W HCPCS: Performed by: EMERGENCY MEDICINE

## 2025-05-07 PROCEDURE — G0425 INPT/ED TELECONSULT30: HCPCS | Mod: GT,,, | Performed by: PSYCHIATRY & NEUROLOGY

## 2025-05-07 PROCEDURE — 82962 GLUCOSE BLOOD TEST: CPT

## 2025-05-07 PROCEDURE — 93010 ELECTROCARDIOGRAM REPORT: CPT | Mod: ,,, | Performed by: INTERNAL MEDICINE

## 2025-05-07 PROCEDURE — 84484 ASSAY OF TROPONIN QUANT: CPT | Performed by: EMERGENCY MEDICINE

## 2025-05-07 PROCEDURE — 80053 COMPREHEN METABOLIC PANEL: CPT | Performed by: EMERGENCY MEDICINE

## 2025-05-07 PROCEDURE — 85025 COMPLETE CBC W/AUTO DIFF WBC: CPT | Performed by: EMERGENCY MEDICINE

## 2025-05-07 PROCEDURE — 25500020 PHARM REV CODE 255: Performed by: EMERGENCY MEDICINE

## 2025-05-07 PROCEDURE — 96374 THER/PROPH/DIAG INJ IV PUSH: CPT

## 2025-05-07 PROCEDURE — 94761 N-INVAS EAR/PLS OXIMETRY MLT: CPT

## 2025-05-07 PROCEDURE — 25000003 PHARM REV CODE 250: Performed by: EMERGENCY MEDICINE

## 2025-05-07 PROCEDURE — 93005 ELECTROCARDIOGRAM TRACING: CPT

## 2025-05-07 RX ORDER — MECLIZINE HYDROCHLORIDE 25 MG/1
25 TABLET ORAL
Status: COMPLETED | OUTPATIENT
Start: 2025-05-07 | End: 2025-05-07

## 2025-05-07 RX ORDER — ONDANSETRON 4 MG/1
4 TABLET, ORALLY DISINTEGRATING ORAL EVERY 8 HOURS PRN
Qty: 12 TABLET | Refills: 0 | Status: SHIPPED | OUTPATIENT
Start: 2025-05-07

## 2025-05-07 RX ORDER — DIPHENHYDRAMINE HYDROCHLORIDE 50 MG/ML
12.5 INJECTION, SOLUTION INTRAMUSCULAR; INTRAVENOUS
Status: COMPLETED | OUTPATIENT
Start: 2025-05-07 | End: 2025-05-07

## 2025-05-07 RX ORDER — PROCHLORPERAZINE EDISYLATE 5 MG/ML
5 INJECTION INTRAMUSCULAR; INTRAVENOUS
Status: COMPLETED | OUTPATIENT
Start: 2025-05-07 | End: 2025-05-07

## 2025-05-07 RX ORDER — MECLIZINE HYDROCHLORIDE 25 MG/1
25 TABLET ORAL 3 TIMES DAILY PRN
Qty: 20 TABLET | Refills: 0 | Status: SHIPPED | OUTPATIENT
Start: 2025-05-07

## 2025-05-07 RX ADMIN — MECLIZINE HYDROCHLORIDE 25 MG: 25 TABLET ORAL at 09:05

## 2025-05-07 RX ADMIN — SODIUM CHLORIDE 1000 ML: 0.9 INJECTION, SOLUTION INTRAVENOUS at 09:05

## 2025-05-07 RX ADMIN — IOHEXOL 100 ML: 350 INJECTION, SOLUTION INTRAVENOUS at 08:05

## 2025-05-07 RX ADMIN — DIPHENHYDRAMINE HYDROCHLORIDE 12.5 MG: 50 INJECTION INTRAMUSCULAR; INTRAVENOUS at 12:05

## 2025-05-07 RX ADMIN — PROCHLORPERAZINE EDISYLATE 5 MG: 5 INJECTION INTRAMUSCULAR; INTRAVENOUS at 12:05

## 2025-05-07 NOTE — ED PROVIDER NOTES
"Encounter Date: 5/7/2025       History     Chief Complaint   Patient presents with    Dizziness     Wife reports patient woke her up at am, on the floor c/o dizziness. Patient reports double vision and "room spinning when I turn my head from side to side." Patient follows commands, does appear off-balanced. No facial droop noted, speech clear and intact.      69-year-old male with history of HTN, high cholesterol presents for evaluation of acute onset dizziness and leg weakness that started upon waking up this morning.  He was otherwise at normal baseline recently, went to bed feeling normal.  No recent illness or decreased appetite.  He woke up feeling sensation of room spinning with double vision and could not ambulate without assistance due to this as well as both his legs feeling weak.  He denies any associated numbness, no history of similar previous episodes.  No associated tinnitus or sinus congestion.  No associated headache, nausea/vomiting, chest pain/SOB or other new complaints      Review of patient's allergies indicates:   Allergen Reactions    Codeine Other (See Comments)     Rxn unknown       Past Medical History:   Diagnosis Date    Cataract     Colon polyp     Glaucoma suspect     Hyperlipidemia     Hypertension      Past Surgical History:   Procedure Laterality Date    COLONOSCOPY      TONSILLECTOMY       Family History   Problem Relation Name Age of Onset    Cancer Mother          breast cancer    Cataracts Mother      No Known Problems Father      No Known Problems Daughter      Hemophilia Son      No Known Problems Son      Cancer Brother          lung CA - related to work exposure    Melanoma Neg Hx      Glaucoma Neg Hx      Macular degeneration Neg Hx       Social History[1]  Review of Systems   Constitutional:  Negative for fever.   HENT:  Negative for congestion.    Eyes:  Negative for redness.   Respiratory:  Negative for shortness of breath.    Cardiovascular:  Negative for chest pain. "   Gastrointestinal:  Negative for abdominal pain.   Genitourinary:  Negative for dysuria.   Musculoskeletal:  Positive for gait problem.   Skin:  Negative for rash.   Neurological:  Positive for dizziness and weakness. Negative for headaches.   Psychiatric/Behavioral:  Negative for confusion.        Physical Exam     Initial Vitals [05/07/25 0814]   BP Pulse Resp Temp SpO2   (!) 186/94 79 18 98 °F (36.7 °C) 96 %      MAP       --         Physical Exam    Nursing note and vitals reviewed.  Constitutional: He is not diaphoretic. No distress.   HENT:   Head: Normocephalic and atraumatic.   Eyes: Conjunctivae are normal. No scleral icterus.   Neck: Neck supple.   Cardiovascular:  Normal rate, regular rhythm, normal heart sounds and intact distal pulses.           No murmur heard.  Pulmonary/Chest: Breath sounds normal. No respiratory distress. He has no wheezes. He has no rhonchi. He has no rales.   Abdominal: Abdomen is soft. There is no abdominal tenderness.   Musculoskeletal:         General: No edema.      Cervical back: Neck supple.     Neurological: He is alert and oriented to person, place, and time.   4/5 strength bilateral lower extremities, 5/5 upper extremities.  normal sensation of all extremities   Skin: Skin is warm and dry.         ED Course   Procedures  Labs Reviewed   COMPREHENSIVE METABOLIC PANEL - Abnormal       Result Value    Sodium 138      Potassium 4.1      Chloride 105      CO2 20 (*)     Glucose 108      BUN 15      Creatinine 1.1      Calcium 9.7      Protein Total 8.5 (*)     Albumin 4.5      Bilirubin Total 0.9      ALP 68      AST 37      ALT 33      Anion Gap 13      eGFR >60     POCT GLUCOSE - Abnormal    POCT Glucose 114 (*)    PROTIME-INR - Normal    PT 12.1      INR 1.1     TSH - Normal    TSH 0.577     TROPONIN I - Normal    Troponin-I <0.006     CBC WITH DIFFERENTIAL - Normal    WBC 5.50      RBC 5.98      HGB 17.9      HCT 52.2      MCV 87      MCH 29.9      MCHC 34.3      RDW 12.8       Platelet Count 165      MPV 10.3      Nucleated RBC 0      Neut % 69.3      Lymph % 22.0      Mono % 6.5      Eos % 1.5      Basophil % 0.5      Imm Grans % 0.2      Neut # 3.81      Lymph # 1.21      Mono # 0.36      Eos # 0.08      Baso # 0.03      Imm Grans # 0.01     CBC W/ AUTO DIFFERENTIAL    Narrative:     The following orders were created for panel order CBC W/ AUTO DIFFERENTIAL.  Procedure                               Abnormality         Status                     ---------                               -----------         ------                     CBC with Differential[[id number]]       Normal              Final result                 Please view results for these tests on the individual orders.   LIPID PANEL    Cholesterol Total 173      Triglyceride 64      HDL Cholesterol 46      LDL Cholesterol 114.2      HDL/Cholesterol Ratio 26.6      Cholesterol/HDL Ratio 3.8      Non HDL Cholesterol 127     EXTRA TUBES    Narrative:     The following orders were created for panel order EXTRA TUBES.  Procedure                               Abnormality         Status                     ---------                               -----------         ------                     Lavender Top Hold[[id number]]                               Final result               Gold Top Hold[[id number]]                                   Final result                 Please view results for these tests on the individual orders.   LAVENDER TOP HOLD    Extra Tube Hold for add-ons.     GOLD TOP HOLD    Extra Tube Hold for add-ons.     POCT GLUCOSE, HAND-HELD DEVICE   ISTAT CREATININE    POC Creatinine 1.2      Sample VENOUS       EKG Readings: (Independently Interpreted)   Normal sinus rhythm at rate 73, no acute ischemia or arrhythmia no change from previous       Imaging Results              MRI Brain Without Contrast (Final result)  Result time 05/07/25 11:25:56      Final result by Chito Jordan MD (05/07/25 11:25:56)                    Impression:      No evidence of acute infarct or hemorrhage.    Probable subtle asymmetric left temporal atrophy.  For clinical correlation.      Electronically signed by: Chito Jordan MD  Date:    05/07/2025  Time:    11:25               Narrative:    EXAMINATION:  MRI BRAIN WITHOUT CONTRAST    CLINICAL HISTORY:  Dizziness, persistent/recurrent, cardiac or vascular cause suspected;    TECHNIQUE:  Multiplanar multisequence MR imaging of the brain was performed without intravenous contrast.    COMPARISON:  CTA 05/07/2025    FINDINGS:  Intracranial Compartment:    Ventricles are stable in size.  No evidence of hydrocephalus.  Probable slight asymmetric left temporal atrophy.    No diffusion restriction to indicate an acute infarction.  No evidence of a remote major vascular distribution infarct. No evidence of recent or remote hemorrhage.  No significant intracranial mass effect or midline shift.    No extra-axial blood or fluid collections.    Skull/Extracranial Contents (limited evaluation):    Bone marrow signal intensity is unremarkable.                                       CTA Head and Neck (xpd) (Final result)  Result time 05/07/25 09:00:33      Final result by Chito Jordan MD (05/07/25 09:00:33)                   Impression:      No evidence of acute intracranial hemorrhage or parenchymal changes to indicate an acute major vascular distribution infarct.    Subtle asymmetric left temporal atrophy.  For clinical correlation.    CT arteriogram demonstrates no evidence of high-grade stenosis or intracranial large vessel occlusion.  Modest atherosclerotic change as above.      Electronically signed by: Chito Jordan MD  Date:    05/07/2025  Time:    09:00               Narrative:    EXAMINATION:  CTA HEAD AND NECK (XPD)    CLINICAL HISTORY:  Neuro deficit, acute, stroke suspected;Dizziness, persistent/recurrent, cardiac or vascular cause suspected;    TECHNIQUE:  Axial CT images obtained  throughout the region of the head before and after the administration of intravenous contrast.    CT angiogram was performed through the cervical and intracranial vasculature during the IV bolus administration of 100mL of Omnipaque 350.  Multiplanar MPR and MIP reformats were performed.    COMPARISON:  CT head 11/26/2024    FINDINGS:  Subtle asymmetric left temporal atrophy.  No evidence of recent or remote major vascular distribution infarct.  No acute hemorrhage.  No significant intracranial mass effect or midline shift.  No enhancing lesions.    No evidence of hydrocephalus.    No extra-axial blood or fluid collections.    The cranium appears intact. Mastoid air cells and paranasal sinuses are essentially clear.    Mild cervical spondylosis without evidence of an acute displaced fracture.    CTA:    The aortic arch demonstrates no significant stenosis at the major branch vessel origins.    The right common carotid artery is normal in caliber. Mild plaque without associated luminal encroachment at the carotid bifurcation (less than 50% stenosis per NASCET style measurements).The right internal carotid artery is normal in caliber.  Modest scattered calcification in the cavernous segment.    The left common carotid artery is normal in caliber. Mild plaque without associated luminal encroachment at the carotid bifurcation (less than 50% stenosis per NASCET style measurements).The left internal carotid artery is normal in caliber.  Modest scattered calcification in the cavernous segment.    The right vertebral artery is normal in caliber.    The left vertebral artery is normal in caliber.    Basilar artery is within normal limits without focal abnormality.    The proximal anterior, middle, and posterior cerebral arteries are within normal limits.  No evidence of significant stenosis, focal occlusion, or intracranial aneurysm.                                       Medications   iohexoL (OMNIPAQUE 350) injection 100 mL  (100 mLs Intravenous Given 5/7/25 0830)   sodium chloride 0.9% bolus 1,000 mL 1,000 mL (0 mLs Intravenous Stopped 5/7/25 0941)   meclizine tablet 25 mg (25 mg Oral Given 5/7/25 0905)   prochlorperazine injection Soln 5 mg (5 mg Intravenous Given 5/7/25 1258)   diphenhydrAMINE injection 12.5 mg (12.5 mg Intravenous Given 5/7/25 1259)     Medical Decision Making      69-year-old male with history of HTN, high cholesterol presents for evaluation of acute onset dizziness and leg weakness that started upon waking up this morning.  Patient was at normal baseline recently, woke up and could not ambulate due to room spinning type dizziness with double vision, as well as bilateral leg weakness.  He was found on the floor by his wife, but no head trauma or injuries noted, he just could not walk.  No associated nausea/vomiting, tinnitus, congestion, recent illness, or other new symptoms; no history of similar previous episodes.  On arrival patient with elevated blood pressure but otherwise normal vitals, with 4/5 bilateral lower extremity strength but normal strength of upper extremities and sensation intact.  No facial droop or speech abnormalities, he is able to follow commands.   Differential diagnosis includes peripheral vertigo, cerebellar CVA, BPPV.  Given risk factors and acute onset with no prior history, stroke code called on patient arrival and he was emergently taken to CT for brain imaging and neurology evaluation.      Tele neurology evaluation did not recommend tPA, and CTA head/neck with no acute findings other than subtle asymmetry of left temporal atrophy.  Basic labs with no acute findings including normal troponin.  Patient had some improvement after meclizine but recurrent symptoms after he returned from MRI brain which showed no acute CVA or process other than previously seen left temporal asymmetric atrophy.  Patient then treated with IV Compazine/Benadryl and is feeling better, able to ambulate with no  instability in the ED, only complaining of little lightheadedness which he thinks is related to not eating anything today.  I offered patient admission to the hospital for further management of suspected peripheral vertigo given reassuring brain imaging and no neuro deficits at this time, but he prefers trial of outpatient management and wife at bedside he is comfortable with this as well.  Will Rx meclizine and Zofran p.r.n. and patient referred to ENT for further management of vertigo, advised on return precautions for any worsening symptoms or new concerning complaints        Amount and/or Complexity of Data Reviewed  Labs: ordered.  Radiology: ordered.    Risk  Prescription drug management.                                      Clinical Impression:  Final diagnoses:  [R42] Dizziness (Primary)  [R42] Vertigo          ED Disposition Condition    Discharge Stable          ED Prescriptions       Medication Sig Dispense Start Date End Date Auth. Provider    meclizine (ANTIVERT) 25 mg tablet Take 1 tablet (25 mg total) by mouth 3 (three) times daily as needed for Dizziness. 20 tablet 5/7/2025 -- Garfield Munoz MD    ondansetron (ZOFRAN-ODT) 4 MG TbDL Take 1 tablet (4 mg total) by mouth every 8 (eight) hours as needed (Dizziness). 12 tablet 5/7/2025 -- Garfield Munoz MD          Follow-up Information       Follow up With Specialties Details Why Contact Info Additional Information    [facility] Schedule an appointment as soon as possible for a visit in 3 days  [address]  [facility] [address] [phone] Turn at Entrance [address] on [address] in [facility] and take elevators to Floor 2. Follow signs to [facility]. Take [facility] Elevators to Floor 8 for [address].    [facility]  Emergency Dept Emergency Medicine Go to  If symptoms worsen [address] [facility] [address] [phone]                  [1]   Social  History  Tobacco Use    Smoking status: Never    Smokeless tobacco: Never   Substance Use Topics    Alcohol use: No    Drug use: No        Garfield Munoz MD  05/07/25 1500

## 2025-05-07 NOTE — TELEMEDICINE CONSULT
[facility]  Vascular Neurology  Comprehensive Stroke Center  TeleVascular Neurology Acute Consultation Note        Consult Information  Consults    Consulting Provider: LATONIA STOUT   Current Providers  No providers found    Patient Location:  [facility] EMERGENCY DEPARTMENT Emergency Department    Spoke hospital nurse at bedside with patient assisting consultant.  Patient information was obtained from patient, past medical records, and primary team.       Stroke Documentation  Acute Stroke Times   Last Known Normal Date: 05/06/25  Last Known Normal Time: 2000  Symptom Onset Date: 05/07/25  Symptom Onset Time: 0400  Stroke Team Called Date: 05/07/25  Stroke Team Called Time: 0823  Stroke Team Arrival Date: 05/07/25  Stroke Team Arrival Time: 0830  CT Interpretation Time: 0830  Thrombolytic Therapy Recommended: No  CTA Interpretation Time: 0840  Thrombectomy Recommended: No    NIH Scale:  Interval: baseline  1a. Level of Consciousness: 0-->Alert, keenly responsive  1b. LOC Questions: 0-->Answers both questions correctly  1c. LOC Commands: 0-->Performs both tasks correctly  2. Best Gaze: 0-->Normal  3. Visual: 0-->No visual loss  4. Facial Palsy: 0-->Normal symmetrical movements  5a. Motor Arm, Left: 2-->Some effort against gravity, limb cannot get to or maintain (if cued) 90 (or 45) degrees, drifts down to bed, but has some effort against gravity  5b. Motor Arm, Right: 2-->Some effort against gravity, limb cannot get to or maintain (if cued) 90 (or 45) degrees, drifts down to bed, but has some effort against gravity  6a. Motor Leg, Left: 1-->Drift, leg falls by the end of the 5-sec period but does not hit bed  6b. Motor Leg, Right: 2-->Some effort against gravity, leg falls to bed by 5 secs, but has some effort against gravity  7. Limb Ataxia: 0-->Absent  8. Sensory: 0-->Normal, no sensory loss  9. Best Language: 0-->No aphasia, normal  10. Dysarthria: 0-->Normal  11. Extinction and  "Inattention (formerly Neglect): 0-->No abnormality  Total (NIH Stroke Scale): 7      Modified Kristen: Score: 0  Otwell Coma Scale: 15   ABCD2 Score:    [id number] Score:    HAS -BLED Score:    ICH Score:    Hunt & Teresa Classification:      Blood pressure (!) 186/94, pulse 79, temperature 98 °F (36.7 °C), temperature source Oral, resp. rate 18, height 5' 8" (1.727 m), weight 76.2 kg (168 lb), SpO2 96%.      In my opinion, this was a: Tier 2; VAN Stroke Assessment: Not Applicable     Medical Decision Making  HPI:  69 y.o. male with HTN, HLD, BPH, vertigo, presents with complains of dizziness, imbalance, inability to walk, generalized weakness, and no speaking well since awakening at 4 am today. Never had similar symptoms before.     Images personally reviewed and interpreted:  Study: Head CT and CTA Head & Neck  Study Interpretation: no acute abnormality. No LVO, high grade stenosis, or significant carotid disease.     Assessment and plan:  Acute onset of aforementioned symptoms since awakening, but unusual neuro exam and unremarkable CT/CTA brain and neck.  No TNK or MT candidate.  Recommended MRI brain to better elucidate his symptoms.    Lytics recommendation: Thrombolytic therapy not recommended due to Outside of treatment window     Thrombectomy recommendation: No; No large vessel occlusion identified on imaging   Placement recommendation: pending further studies               ROS  Physical Exam  Past Medical History:   Diagnosis Date    Cataract     Colon polyp     Glaucoma suspect     Hyperlipidemia     Hypertension      Past Surgical History:   Procedure Laterality Date    COLONOSCOPY      TONSILLECTOMY       Family History   Problem Relation Name Age of Onset    Cancer Mother          breast cancer    Cataracts Mother      No Known Problems Father      No Known Problems Daughter      Hemophilia Son      No Known Problems Son      Cancer Brother          lung CA - related to work exposure    Melanoma Neg Hx "      Glaucoma Neg Hx      Macular degeneration Neg Hx         Diagnoses  Problem Noted   Imbalance 5/7/2025       Giovanni Raymond MD      Emergent/Acute neurological consultation requested by spoke provider due to critical concerns for possible cerebrovascular event that could result in permanent loss of neurologic/bodily function, severe disability or death of this patient.  Immediate/timely evaluation by a highly prepared expert is paramount for optimal outcomes  High risk for neurological deterioration if not properly diagnosed  High risk for neurological deterioration if not treated promplty/as soon as possible  Complex diagnostic evaluation may be required (advanced imaging)  High risk treatment options (thrombolytics and/or thrombectomy)    Patient care was coordinated with spoke provider, including but not limted to    Discussing likely diagnosis/etiology of symptoms  Making recommendations for further diagnostic studies  Making recommendations for intravenous thrombolytics or other advanced therapies  Making recommendations for disposition (admission/transfer for higher level of care)      Neurology consultation requested by spoke provider. Audiovisual encounter with the patient performed using a secure connection.  Results and impressions from the visit are documented on this note and were communicated to the consulting provider/team via direct communication. The note has been shared for addition to the patients electronic medical record.

## 2025-05-07 NOTE — ED NOTES
Report received. Care assumed during lunch break. Patient in bed. NAD noted. RR even and non labored. Denies pain but some nausea. Family member at bedside.

## 2025-05-07 NOTE — Clinical Note
"Cuate Ivy" Marv was seen and treated in our emergency department on 5/7/2025.  He may return to work on 05/12/2025.       If you have any questions or concerns, please don't hesitate to call.      Sunny DEL ROSARIO RN    "

## 2025-05-07 NOTE — ED TRIAGE NOTES
Pt reports to ED with c/o dizziness. Pt reports sudden onset of dizziness at 4 AM when he got up to get ready for work. Pt states that he had sensation of room spinning and began to feel severe weakness in BUE and BLE along with some difficulty getting words out. LKN between 8 and 9 PM yesterday

## 2025-05-07 NOTE — SUBJECTIVE & OBJECTIVE
HPI:  69 y.o. male with HTN, HLD, BPH, vertigo, presents with complains of dizziness, imbalance, inability to walk, generalized weakness, and no speaking well since awakening at 4 am today. Never had similar symptoms before.     Images personally reviewed and interpreted:  Study: Head CT and CTA Head & Neck  Study Interpretation: no acute abnormality. No LVO, high grade stenosis, or significant carotid disease.     Assessment and plan:  Acute onset of aforementioned symptoms since awakening, but unusual neuro exam and unremarkable CT/CTA brain and neck.  No TNK or MT candidate.  Recommended MRI brain to better elucidate his symptoms.    Lytics recommendation: Thrombolytic therapy not recommended due to Outside of treatment window     Thrombectomy recommendation: No; No large vessel occlusion identified on imaging   Placement recommendation: pending further studies

## 2025-05-08 ENCOUNTER — TELEPHONE (OUTPATIENT)
Dept: OTOLARYNGOLOGY | Facility: CLINIC | Age: 70
End: 2025-05-08
Payer: COMMERCIAL

## 2025-05-08 LAB
OHS QRS DURATION: 96 MS
OHS QTC CALCULATION: 429 MS

## 2025-05-08 NOTE — TELEPHONE ENCOUNTER
Spoke with patient, told him we could see him at 9:00am for audio and 10:30 am for Dr. Patricia, so he is to come to [address] first than up to [address] for Dr. Patricia at 10:30 am

## 2025-05-14 ENCOUNTER — CLINICAL SUPPORT (OUTPATIENT)
Facility: CLINIC | Age: 70
End: 2025-05-14
Payer: COMMERCIAL

## 2025-05-14 ENCOUNTER — OFFICE VISIT (OUTPATIENT)
Dept: OTOLARYNGOLOGY | Facility: CLINIC | Age: 70
End: 2025-05-14
Payer: COMMERCIAL

## 2025-05-14 VITALS
HEIGHT: 68 IN | WEIGHT: 170 LBS | BODY MASS INDEX: 25.76 KG/M2 | TEMPERATURE: 98 F | HEART RATE: 64 BPM | DIASTOLIC BLOOD PRESSURE: 81 MMHG | SYSTOLIC BLOOD PRESSURE: 128 MMHG

## 2025-05-14 DIAGNOSIS — H91.90 HEARING DISORDER, UNSPECIFIED LATERALITY: Primary | ICD-10-CM

## 2025-05-14 DIAGNOSIS — H74.8X9 ABNORMAL ACOUSTIC REFLEX: ICD-10-CM

## 2025-05-14 DIAGNOSIS — R42 DIZZINESS: ICD-10-CM

## 2025-05-14 DIAGNOSIS — H90.3 ASYMMETRICAL SENSORINEURAL HEARING LOSS: Primary | ICD-10-CM

## 2025-05-14 DIAGNOSIS — H90.3 SNHL (SENSORY-NEURAL HEARING LOSS), ASYMMETRICAL: ICD-10-CM

## 2025-05-14 DIAGNOSIS — R42 VERTIGO: ICD-10-CM

## 2025-05-14 DIAGNOSIS — H53.9 VISION DISTURBANCE: ICD-10-CM

## 2025-05-14 DIAGNOSIS — Z77.122 HISTORY OF EXPOSURE TO NOISE: ICD-10-CM

## 2025-05-14 PROCEDURE — 3288F FALL RISK ASSESSMENT DOCD: CPT | Mod: CPTII,S$GLB,, | Performed by: OTOLARYNGOLOGY

## 2025-05-14 PROCEDURE — 3079F DIAST BP 80-89 MM HG: CPT | Mod: CPTII,S$GLB,, | Performed by: OTOLARYNGOLOGY

## 2025-05-14 PROCEDURE — 92557 COMPREHENSIVE HEARING TEST: CPT | Mod: S$GLB,,, | Performed by: AUDIOLOGIST-HEARING AID FITTER

## 2025-05-14 PROCEDURE — 92550 TYMPANOMETRY & REFLEX THRESH: CPT | Mod: S$GLB,,, | Performed by: AUDIOLOGIST-HEARING AID FITTER

## 2025-05-14 PROCEDURE — 1160F RVW MEDS BY RX/DR IN RCRD: CPT | Mod: CPTII,S$GLB,, | Performed by: OTOLARYNGOLOGY

## 2025-05-14 PROCEDURE — 1125F AMNT PAIN NOTED PAIN PRSNT: CPT | Mod: CPTII,S$GLB,, | Performed by: OTOLARYNGOLOGY

## 2025-05-14 PROCEDURE — 1159F MED LIST DOCD IN RCRD: CPT | Mod: CPTII,S$GLB,, | Performed by: OTOLARYNGOLOGY

## 2025-05-14 PROCEDURE — 1101F PT FALLS ASSESS-DOCD LE1/YR: CPT | Mod: CPTII,S$GLB,, | Performed by: OTOLARYNGOLOGY

## 2025-05-14 PROCEDURE — 99204 OFFICE O/P NEW MOD 45 MIN: CPT | Mod: S$GLB,,, | Performed by: OTOLARYNGOLOGY

## 2025-05-14 PROCEDURE — 4010F ACE/ARB THERAPY RXD/TAKEN: CPT | Mod: CPTII,S$GLB,, | Performed by: OTOLARYNGOLOGY

## 2025-05-14 PROCEDURE — 3074F SYST BP LT 130 MM HG: CPT | Mod: CPTII,S$GLB,, | Performed by: OTOLARYNGOLOGY

## 2025-05-14 PROCEDURE — 3008F BODY MASS INDEX DOCD: CPT | Mod: CPTII,S$GLB,, | Performed by: OTOLARYNGOLOGY

## 2025-05-14 NOTE — PATIENT INSTRUCTIONS
Proceed with MRI as ordered, then follow up.    Ophthalmology evaluation.      Keep follow ups with PCP, etc.

## 2025-05-14 NOTE — Clinical Note
Your patient, Cuate Fair, was recently seen for an audiogram.  My assessment and recommendations are enclosed.  If you should have any questions or concerns, please contact me at [phone].   Sincerely, Maria T Garcia, CCC-A Audiologist [facility]

## 2025-05-15 NOTE — PROGRESS NOTES
Maria T Garcia, CCC-A  Audiologist - [facility] [address] [place] [address]  [email or web address]  [phone]    Patient: Cuate Fair   MRN: [id number]  [address]    Home Phone [phone]   Work Phone Not on file.   Mobile [phone]   : 1955  MADRIGAL: 2025      AUDIOLOGICAL EVALUATION    Mr. Cuate Fair was seen in the clinic today for an audiological evaluation.  Mr. Fair reported decreased hearing, dizziness, and history of noise exposure.  Mr. Fair denied tinnitus and family history of hearing loss.    Audiological testing revealed a normal sloping to severe sensorineural hearing loss for the Right ear and a normal sloping to severe high-frequency sensorineural hearing loss for the Left ear.  A speech reception threshold was obtained at 30 dB HL for the Right ear and at 25 dB HL for the Left ear.  Speech discrimination was 88% for the Right ear and 96% for the Left ear.  There was an asymmetry noted between ears from 7644-5176 Hz, with the Right ear poorer than the Left ear.    Tympanometry testing revealed a Type A tympanogram for the Right ear and a Type A tympanogram for the Left ear.  Acoustic reflex thresholds were elevated/absent ipsilaterally in both ears.      RECOMMENDATIONS:   It is recommended that Cuate Fair:  Follow up medically with Dr. Patricia.    Consider binaural hearing aids to improve speech understanding, pending medical clearance.  Continue to receive audiological monitoring annually.  Use precaution and/or hearing protection in noisy environments.    If you should have any questions or concerns regarding the above information, please do not hesitate to contact me at [phone].      _______________________________  Maria T Garcia, LAUREN-A  Audiologist

## 2025-05-17 NOTE — PROGRESS NOTES
"Subjective:       Patient ID: Cuate Fair is a 70 y.o. male.    Chief Complaint: Dizziness    Had acute onset of dizziness a week ago noted on awakening and getting ready for work.  States felt very dizzy described as spinning sensation, as if the room was spinning with blurred vision, very weak and leg weakness.  No associated aural fullness, tinnitus, change in hearing.  Eventually able to crawl into the bedroom to let his wife know.  Wife reported finding him on the floor and son assisted getting him to the ED.  No speech problems, but at some point difficulty understanding what others were saying versus sounded far away, no loss of consciousness.      Stroke code was called and had emergent CTA of head and neck and MRI of brain.  This demonstrated no evidence of major vascular distribution infarct, no acute intraparenchymal hemorrhage, no high-grade stenosis, no large vessel occlusion, modest scattered calcifications bilateral cavernous segments, and previously noted subtle left temporal atrophy.  Required meclizine and then Compazine/Benadryl to calm his symptoms.  Had neuro telehealth consult and considered for admission.  Chart states he elected to leave upon noting some improvement.      Sent out on meclizine and Zofran.  Symptoms gradually improved over the next several days and has been off of Zofran and meclizine x 4 days now.  States weakness and vision are better, but still some generalized weakness and "cloudy" vision on and off.  No prior similar episode, but has had few episodes of mild dizziness and "cloudy" vision on and off for several months prior to this.  BP initially 186/94 in ED but within normal limits today.          Review of Systems     Constitutional: Negative for fever.      HENT: Positive for hearing loss.  Negative for ear discharge, ear pain, sore throat and stuffy nose.      Respiratory:  Negative for cough and shortness of breath.      Cardiovascular:  Negative for chest " "pain.     Gastrointestinal:  Negative for abdominal pain.     Neurological: Positive for dizziness.     Hematologic: Negative for swollen glands.                Objective:        Vitals:    05/14/25 1013   BP: 128/81   Pulse: 64   Temp: 98.2 °F (36.8 °C)     Body mass index is 25.85 kg/m².  Physical Exam  Constitutional:       General: He is not in acute distress.     Appearance: He is well-developed.   HENT:      Head: Normocephalic and atraumatic.      Right Ear: Tympanic membrane, ear canal and external ear normal.      Left Ear: Tympanic membrane, ear canal and external ear normal.      Ears:      Comments:   Fukuda negative.     Nose: No nasal deformity, mucosal edema or rhinorrhea.      Mouth/Throat:      Mouth: Mucous membranes are moist.      Pharynx: No pharyngeal swelling, oropharyngeal exudate or posterior oropharyngeal erythema.   Neck:      Trachea: Phonation normal.   Pulmonary:      Effort: Pulmonary effort is normal. No respiratory distress.   Skin:     General: Skin is warm and dry.   Neurological:      Mental Status: He is alert and oriented to person, place, and time.   Psychiatric:         Speech: Speech normal.         Behavior: Behavior normal.         Tests / Results:                      Assessment:       1. Vertigo    2. Vision disturbance    3. SNHL (sensory-neural hearing loss), asymmetrical        Plan:       Reviewed all above and considerations and recommendations and answered questions.  History concerning for posterior circulation ischemia despite workup to date.  Needs ophthalmologic evaluation for complaint of "cloudy" vision on and off and referral placed.  Also needs dedicated MRI of IAC's to further evaluate mid frequency asymmetry on audiogram, probably unrelated.  Return next few weeks for results and recheck.  Keep follow-ups as per PCP and neuro and ED prompts.                "

## 2025-05-19 ENCOUNTER — PATIENT MESSAGE (OUTPATIENT)
Dept: OPTOMETRY | Facility: CLINIC | Age: 70
End: 2025-05-19
Payer: COMMERCIAL

## 2025-05-22 ENCOUNTER — HOSPITAL ENCOUNTER (OUTPATIENT)
Dept: RADIOLOGY | Facility: OTHER | Age: 70
Discharge: HOME | End: 2025-05-22
Attending: OTOLARYNGOLOGY
Payer: COMMERCIAL

## 2025-05-22 DIAGNOSIS — H90.3 SNHL (SENSORY-NEURAL HEARING LOSS), ASYMMETRICAL: ICD-10-CM

## 2025-05-22 PROCEDURE — 70553 MRI BRAIN STEM W/O & W/DYE: CPT | Mod: 26,,, | Performed by: RADIOLOGY

## 2025-05-22 PROCEDURE — A9585 GADOBUTROL INJECTION: HCPCS | Performed by: OTOLARYNGOLOGY

## 2025-05-22 PROCEDURE — 70553 MRI BRAIN STEM W/O & W/DYE: CPT | Mod: TC

## 2025-05-22 PROCEDURE — 25500020 PHARM REV CODE 255: Performed by: OTOLARYNGOLOGY

## 2025-05-22 RX ORDER — GADOBUTROL 604.72 MG/ML
7.5 INJECTION INTRAVENOUS
Status: COMPLETED | OUTPATIENT
Start: 2025-05-22 | End: 2025-05-22

## 2025-05-22 RX ADMIN — GADOBUTROL 7.5 ML: 604.72 INJECTION INTRAVENOUS at 04:05

## 2025-06-02 ENCOUNTER — HOSPITAL ENCOUNTER (OUTPATIENT)
Dept: RADIOLOGY | Facility: OTHER | Age: 70
Discharge: HOME | End: 2025-06-02
Attending: NURSE PRACTITIONER
Payer: COMMERCIAL

## 2025-06-02 ENCOUNTER — TELEPHONE (OUTPATIENT)
Dept: UROLOGY | Facility: CLINIC | Age: 70
End: 2025-06-02
Payer: COMMERCIAL

## 2025-06-02 ENCOUNTER — RESULTS FOLLOW-UP (OUTPATIENT)
Dept: UROLOGY | Facility: CLINIC | Age: 70
End: 2025-06-02

## 2025-06-02 DIAGNOSIS — N28.1 COMPLEX RENAL CYST: ICD-10-CM

## 2025-06-02 PROCEDURE — 74018 RADEX ABDOMEN 1 VIEW: CPT | Mod: 26,,, | Performed by: RADIOLOGY

## 2025-06-02 PROCEDURE — 74018 RADEX ABDOMEN 1 VIEW: CPT | Mod: TC,FY

## 2025-06-02 PROCEDURE — 76770 US EXAM ABDO BACK WALL COMP: CPT | Mod: TC

## 2025-06-02 PROCEDURE — 76770 US EXAM ABDO BACK WALL COMP: CPT | Mod: 26,,, | Performed by: RADIOLOGY

## 2025-06-11 ENCOUNTER — OFFICE VISIT (OUTPATIENT)
Dept: OPTOMETRY | Facility: CLINIC | Age: 70
End: 2025-06-11
Payer: COMMERCIAL

## 2025-06-11 DIAGNOSIS — R42 VERTIGO: ICD-10-CM

## 2025-06-11 DIAGNOSIS — H52.4 BILATERAL PRESBYOPIA: ICD-10-CM

## 2025-06-11 DIAGNOSIS — H53.9 VISION DISTURBANCE: ICD-10-CM

## 2025-06-11 DIAGNOSIS — H40.013 OPEN ANGLE WITH BORDERLINE FINDINGS OF BOTH EYES: Primary | ICD-10-CM

## 2025-06-11 PROCEDURE — 99999 PR PBB SHADOW E&M-EST. PATIENT-LVL III: CPT | Mod: PBBFAC,,, | Performed by: OPTOMETRIST

## 2025-06-13 NOTE — PROGRESS NOTES
HPI    JOSE GUADALUPE: 2/6/2024  Chief complaint (CC): patient here for ocular health check   Glasses? + readers   Contacts? -  H/o eye surgery, injections or laser: -    H/o eye injury:   5/7/2025  Vertigo episode   He woke up feeling sensation of room spinning with double vision and could   not ambulate without assistance due to this as well as both his legs   feeling weak.  He denies any associated numbness, no history of similar   previous episodes.     Known eye conditions? -  Family h/o eye conditions? -  Eye gtts? -      (-) Flashes (-)  Floaters (-) Mucous   (-)  Tearing (-) Itching (-) Burning   (-) Headaches (-) Eye Pain/discomfort (-) Irritation   (-)  Redness (-) Double vision (++) Blurry vision    Diabetic? -  A1c? Hemoglobin A1C       Date                     Value               Ref Range             Status                05/19/2022               5.5                 4.0 - 5.6 %           Final                Last edited by Deysi Pérez, OD on 6/11/2025  4:32 PM.            Assessment /Plan     For exam results, see Encounter Report.      Open angle with borderline findings of both eyes  -     Posterior Segment OCT Optic Nerve- Both eyes; Future  -     Zhang Visual Field - OU - Extended - Both Eyes; Future  (-) FHx. Pachy 509 OD, 520 OS. C/d 0.7 OD, OS. Prev testing w/Dr Salazar and Carl. Educated pt on findings w/understanding. RTC 1 mo OCT/24-2 bienvenido faster. Will message w/results.    Vision disturbance  -     Ambulatory referral/consult to Ophthalmology  Vertigo  Pt reports episode of double vision and room spinning. Pt doesn't report recurrence. No e/o ocular pathology found in association today. Cont f/u w/PCP.     Bilateral presbyopia  SRx released to patient. Patient educated on lens options. Normal ocular health. RTC 1 year for routine exam w/Dr Salazar      Pt reports he is no longer on Plaquenil.                       
ambulatory

## 2025-06-17 ENCOUNTER — CLINICAL SUPPORT (OUTPATIENT)
Dept: OPHTHALMOLOGY | Facility: CLINIC | Age: 70
End: 2025-06-17
Payer: COMMERCIAL

## 2025-06-17 ENCOUNTER — PATIENT MESSAGE (OUTPATIENT)
Dept: OPTOMETRY | Facility: CLINIC | Age: 70
End: 2025-06-17
Payer: COMMERCIAL

## 2025-06-17 DIAGNOSIS — H40.013 OPEN ANGLE WITH BORDERLINE FINDINGS OF BOTH EYES: ICD-10-CM

## 2025-06-17 NOTE — PROGRESS NOTES
OCT/HVF done ou./ rel/fix/coop. Good ou./ chart checked for latex allergy./ plano + .75 x 50/od .50 + 1.00 x 175/os-[facility]

## 2025-06-20 DIAGNOSIS — I10 ESSENTIAL HYPERTENSION: ICD-10-CM

## 2025-06-20 RX ORDER — LOSARTAN POTASSIUM 50 MG/1
50 TABLET ORAL DAILY
Qty: 90 TABLET | Refills: 1 | Status: SHIPPED | OUTPATIENT
Start: 2025-06-20

## 2025-06-20 NOTE — TELEPHONE ENCOUNTER
No care due was identified.  [facility] Embedded Care Due Messages. Reference number: [id number].   6/20/2025 12:10:08 PM CDT

## 2025-06-20 NOTE — TELEPHONE ENCOUNTER
Copied from CRM #[id number]. Topic: Medications - Medication Refill  >> Jun 20, 2025 11:53 AM Danial wrote:  Refill Reques: losartan (COZAAR) 50 MG tablet//patient stated that he only has two pills left and needs to have refills sent to the pharmacy. Please reach out to the pharmacy and call the patient once done.     Preferred Pharmacy: .  [id number] [address] AT [facility] [address]  [facility] [address]  Phone: [phone] Fax: [phone]      Patient's Contact: .[phone]

## 2025-06-20 NOTE — TELEPHONE ENCOUNTER
Refill Decision Note   Cuate Alfordurice  is requesting a refill authorization.  Brief Assessment and Rationale for Refill:  Approve     Medication Therapy Plan:        Comments:     Note composed:5:54 PM 06/20/2025

## 2025-07-02 ENCOUNTER — OFFICE VISIT (OUTPATIENT)
Dept: UROLOGY | Facility: CLINIC | Age: 70
End: 2025-07-02
Payer: COMMERCIAL

## 2025-07-02 VITALS
HEIGHT: 68 IN | HEART RATE: 74 BPM | DIASTOLIC BLOOD PRESSURE: 77 MMHG | OXYGEN SATURATION: 96 % | BODY MASS INDEX: 25.7 KG/M2 | SYSTOLIC BLOOD PRESSURE: 126 MMHG

## 2025-07-02 DIAGNOSIS — N20.0 NEPHROLITHIASIS: ICD-10-CM

## 2025-07-02 DIAGNOSIS — N28.1 COMPLEX RENAL CYST: Primary | ICD-10-CM

## 2025-07-02 PROCEDURE — 99214 OFFICE O/P EST MOD 30 MIN: CPT | Mod: S$GLB,,, | Performed by: NURSE PRACTITIONER

## 2025-07-02 PROCEDURE — 3078F DIAST BP <80 MM HG: CPT | Mod: CPTII,S$GLB,, | Performed by: NURSE PRACTITIONER

## 2025-07-02 PROCEDURE — 4010F ACE/ARB THERAPY RXD/TAKEN: CPT | Mod: CPTII,S$GLB,, | Performed by: NURSE PRACTITIONER

## 2025-07-02 PROCEDURE — 1159F MED LIST DOCD IN RCRD: CPT | Mod: CPTII,S$GLB,, | Performed by: NURSE PRACTITIONER

## 2025-07-02 PROCEDURE — 1101F PT FALLS ASSESS-DOCD LE1/YR: CPT | Mod: CPTII,S$GLB,, | Performed by: NURSE PRACTITIONER

## 2025-07-02 PROCEDURE — 3074F SYST BP LT 130 MM HG: CPT | Mod: CPTII,S$GLB,, | Performed by: NURSE PRACTITIONER

## 2025-07-02 PROCEDURE — 3288F FALL RISK ASSESSMENT DOCD: CPT | Mod: CPTII,S$GLB,, | Performed by: NURSE PRACTITIONER

## 2025-07-02 PROCEDURE — 1160F RVW MEDS BY RX/DR IN RCRD: CPT | Mod: CPTII,S$GLB,, | Performed by: NURSE PRACTITIONER

## 2025-07-02 PROCEDURE — 1126F AMNT PAIN NOTED NONE PRSNT: CPT | Mod: CPTII,S$GLB,, | Performed by: NURSE PRACTITIONER

## 2025-07-02 PROCEDURE — 3008F BODY MASS INDEX DOCD: CPT | Mod: CPTII,S$GLB,, | Performed by: NURSE PRACTITIONER

## 2025-07-02 NOTE — PROGRESS NOTES
Subjective:      Cuate Fair is a 70 y.o. male who returns today regarding his complex renal cyst and nephrolithiaiss.     The patient has kidney stones and a complex renal cyst.    Returns today with surveillance imaging.     Doing well. Denies flank pain and gross hematuria.   Denies bothersome LUTS.  Denies a family history of prostate cancer.    Component      Latest Ref Rng 1/23/2024   Prostate Specific Antigen      0.00 - 4.00 ng/mL 2.5      The following portions of the patient's history were reviewed and updated as appropriate: allergies, current medications, past family history, past medical history, past social history, past surgical history and problem list.    Review of Systems  Constitutional: no fever or chills  ENT: no nasal congestion or sore throat  Respiratory: no cough or shortness of breath  Cardiovascular: no chest pain or palpitations  Gastrointestinal: no nausea or vomiting, tolerating diet  Genitourinary: as per HPI  Hematologic/Lymphatic: no easy bruising or lymphadenopathy  Musculoskeletal: no arthralgias or myalgias  Neurological: no seizures or tremors  Behavioral/Psych: no auditory or visual hallucinations     Objective:   Vitals:   Vitals:    07/02/25 1416   BP: 126/77   Pulse: 74     Physical Exam   General: alert and oriented, no acute distress  Head: normocephalic, atraumatic  Neck: supple, normal ROM  Respiratory: Symmetric expansion, non-labored breathing  Cardiovascular: regular rate and rhythm  Abdomen: soft, non tender, non distended   Genitourinary: deferred  Skin: normal coloration and turgor, no rashes, no suspicious skin lesions noted  Neuro: alert and oriented x3, no gross deficits  Psych: normal judgment and insight, normal mood/affect, and non-anxious    Lab Review   Urinalysis demonstrates : no specimen  Lab Results   Component Value Date    WBC 5.50 05/07/2025    HGB 17.9 05/07/2025    HGB 16.6 09/24/2024    HCT 52.2 05/07/2025    HCT 49.4 09/24/2024    MCV 87  "05/07/2025    MCV 89 09/24/2024     05/07/2025     09/24/2024     Lab Results   Component Value Date    CREATININE 1.1 05/07/2025    BUN 15 05/07/2025     Lab Results   Component Value Date    PSA 2.5 01/23/2024     Imaging   (all images personally reviewed; agree with report below)  BUSHRA- "Right kidney: The right kidney measures 11 cm. No cortical thinning. No loss of corticomedullary distinction. Resistive index measures 0.76.  Several renal cysts, the largest a thinly septated cyst in the mid kidney measuring 4.6 cm (previously 4.2 cm).  Possible intrarenal stones measure up to 0.5 cm.  No hydronephrosis.  Left kidney: The left kidney measures 10.4 cm. No cortical thinning. No loss of corticomedullary distinction. Resistive index measures 0.7.  Smoothly septated left renal cyst, 3.3 cm (previously 3.3 cm).  Intrarenal stone measures up to 0.4 cm.  No hydronephrosis.  The bladder is partially distended at the time of scanning and has an unremarkable appearance.  Prostatomegaly, 5.5 x 3.9 x 5.6 cm."  KUB- "Nonspecific, nonobstructive bowel gas pattern.  Moderate retained stool in the colon.  No suspicious calcifications."    Assessment:     1. Complex renal cyst    2. Nephrolithiasis      Plan:   Diagnoses and all orders for this visit:    Complex renal cyst  -     US Retroperitoneal Complete; Future  -     X-Ray KUB; Future    Nephrolithiasis    Plan  --Reviewed BUSHRA and KUB- stable cysts and stones  --PSA annually with PCP  --Follow up annually with BUSHRA and KUB     "

## 2025-08-22 DIAGNOSIS — E78.5 HYPERLIPIDEMIA, UNSPECIFIED HYPERLIPIDEMIA TYPE: ICD-10-CM

## 2025-08-22 RX ORDER — ATORVASTATIN CALCIUM 40 MG/1
40 TABLET, FILM COATED ORAL DAILY
Qty: 90 TABLET | Refills: 3 | Status: SHIPPED | OUTPATIENT
Start: 2025-08-22

## 2025-09-02 ENCOUNTER — OFFICE VISIT (OUTPATIENT)
Dept: URGENT CARE | Facility: CLINIC | Age: 70
End: 2025-09-02
Payer: COMMERCIAL

## 2025-09-02 VITALS
TEMPERATURE: 100 F | HEART RATE: 75 BPM | HEIGHT: 68 IN | RESPIRATION RATE: 18 BRPM | OXYGEN SATURATION: 97 % | SYSTOLIC BLOOD PRESSURE: 133 MMHG | BODY MASS INDEX: 25.61 KG/M2 | DIASTOLIC BLOOD PRESSURE: 80 MMHG | WEIGHT: 169 LBS

## 2025-09-02 DIAGNOSIS — R05.9 COUGH, UNSPECIFIED TYPE: ICD-10-CM

## 2025-09-02 DIAGNOSIS — J06.9 VIRAL URI: Primary | ICD-10-CM

## 2025-09-02 LAB
CTP QC/QA: YES
SARS-COV+SARS-COV-2 AG RESP QL IA.RAPID: NEGATIVE

## 2025-09-02 PROCEDURE — 87811 SARS-COV-2 COVID19 W/OPTIC: CPT | Mod: QW,S$GLB,, | Performed by: PHYSICIAN ASSISTANT

## 2025-09-02 PROCEDURE — 99213 OFFICE O/P EST LOW 20 MIN: CPT | Mod: S$GLB,,, | Performed by: PHYSICIAN ASSISTANT
